# Patient Record
Sex: MALE | Race: WHITE | NOT HISPANIC OR LATINO | Employment: FULL TIME | URBAN - METROPOLITAN AREA
[De-identification: names, ages, dates, MRNs, and addresses within clinical notes are randomized per-mention and may not be internally consistent; named-entity substitution may affect disease eponyms.]

---

## 2020-08-15 ENCOUNTER — APPOINTMENT (EMERGENCY)
Dept: RADIOLOGY | Facility: HOSPITAL | Age: 65
DRG: 282 | End: 2020-08-15
Payer: COMMERCIAL

## 2020-08-15 ENCOUNTER — HOSPITAL ENCOUNTER (INPATIENT)
Facility: HOSPITAL | Age: 65
LOS: 1 days | DRG: 282 | End: 2020-08-17
Attending: EMERGENCY MEDICINE | Admitting: FAMILY MEDICINE
Payer: COMMERCIAL

## 2020-08-15 ENCOUNTER — APPOINTMENT (EMERGENCY)
Dept: CT IMAGING | Facility: HOSPITAL | Age: 65
DRG: 282 | End: 2020-08-15
Payer: COMMERCIAL

## 2020-08-15 DIAGNOSIS — I21.4 NSTEMI (NON-ST ELEVATED MYOCARDIAL INFARCTION) (HCC): Primary | ICD-10-CM

## 2020-08-15 DIAGNOSIS — T78.40XA ALLERGIC REACTION, INITIAL ENCOUNTER: ICD-10-CM

## 2020-08-15 PROBLEM — I10 ACCELERATED HYPERTENSION: Status: ACTIVE | Noted: 2020-08-15

## 2020-08-15 PROBLEM — T50.8X5A ALLERGIC REACTION TO CONTRAST DYE: Status: ACTIVE | Noted: 2020-08-15

## 2020-08-15 PROBLEM — R01.1 MURMUR, CARDIAC: Status: ACTIVE | Noted: 2020-08-15

## 2020-08-15 PROBLEM — R79.89 ELEVATED BRAIN NATRIURETIC PEPTIDE (BNP) LEVEL: Status: ACTIVE | Noted: 2020-08-15

## 2020-08-15 LAB
ALBUMIN SERPL BCP-MCNC: 4.3 G/DL (ref 3.5–5)
ALP SERPL-CCNC: 79 U/L (ref 46–116)
ALT SERPL W P-5'-P-CCNC: 29 U/L (ref 12–78)
ANION GAP SERPL CALCULATED.3IONS-SCNC: 10 MMOL/L (ref 4–13)
AST SERPL W P-5'-P-CCNC: 20 U/L (ref 5–45)
BASOPHILS # BLD AUTO: 0.05 THOUSANDS/ΜL (ref 0–0.1)
BASOPHILS NFR BLD AUTO: 1 % (ref 0–1)
BILIRUB SERPL-MCNC: 0.5 MG/DL (ref 0.2–1)
BUN SERPL-MCNC: 10 MG/DL (ref 5–25)
CALCIUM SERPL-MCNC: 10.1 MG/DL (ref 8.3–10.1)
CHLORIDE SERPL-SCNC: 103 MMOL/L (ref 100–108)
CO2 SERPL-SCNC: 29 MMOL/L (ref 21–32)
CREAT SERPL-MCNC: 1.07 MG/DL (ref 0.6–1.3)
EOSINOPHIL # BLD AUTO: 0.08 THOUSAND/ΜL (ref 0–0.61)
EOSINOPHIL NFR BLD AUTO: 1 % (ref 0–6)
ERYTHROCYTE [DISTWIDTH] IN BLOOD BY AUTOMATED COUNT: 13.2 % (ref 11.6–15.1)
GFR SERPL CREATININE-BSD FRML MDRD: 72 ML/MIN/1.73SQ M
GLUCOSE SERPL-MCNC: 113 MG/DL (ref 65–140)
HCT VFR BLD AUTO: 48 % (ref 36.5–49.3)
HGB BLD-MCNC: 15.4 G/DL (ref 12–17)
IMM GRANULOCYTES # BLD AUTO: 0.07 THOUSAND/UL (ref 0–0.2)
IMM GRANULOCYTES NFR BLD AUTO: 1 % (ref 0–2)
LYMPHOCYTES # BLD AUTO: 0.96 THOUSANDS/ΜL (ref 0.6–4.47)
LYMPHOCYTES NFR BLD AUTO: 9 % (ref 14–44)
MCH RBC QN AUTO: 27.4 PG (ref 26.8–34.3)
MCHC RBC AUTO-ENTMCNC: 32.1 G/DL (ref 31.4–37.4)
MCV RBC AUTO: 85 FL (ref 82–98)
MONOCYTES # BLD AUTO: 0.46 THOUSAND/ΜL (ref 0.17–1.22)
MONOCYTES NFR BLD AUTO: 4 % (ref 4–12)
NEUTROPHILS # BLD AUTO: 9.28 THOUSANDS/ΜL (ref 1.85–7.62)
NEUTS SEG NFR BLD AUTO: 84 % (ref 43–75)
NRBC BLD AUTO-RTO: 0 /100 WBCS
NT-PROBNP SERPL-MCNC: 804 PG/ML
PLATELET # BLD AUTO: 234 THOUSANDS/UL (ref 149–390)
PMV BLD AUTO: 12 FL (ref 8.9–12.7)
POTASSIUM SERPL-SCNC: 4.6 MMOL/L (ref 3.5–5.3)
PROT SERPL-MCNC: 8.2 G/DL (ref 6.4–8.2)
RBC # BLD AUTO: 5.62 MILLION/UL (ref 3.88–5.62)
SODIUM SERPL-SCNC: 142 MMOL/L (ref 136–145)
TROPONIN I SERPL-MCNC: 0.22 NG/ML
TROPONIN I SERPL-MCNC: 5.96 NG/ML
WBC # BLD AUTO: 10.9 THOUSAND/UL (ref 4.31–10.16)

## 2020-08-15 PROCEDURE — 80053 COMPREHEN METABOLIC PANEL: CPT | Performed by: EMERGENCY MEDICINE

## 2020-08-15 PROCEDURE — 85025 COMPLETE CBC W/AUTO DIFF WBC: CPT | Performed by: EMERGENCY MEDICINE

## 2020-08-15 PROCEDURE — 83880 ASSAY OF NATRIURETIC PEPTIDE: CPT | Performed by: EMERGENCY MEDICINE

## 2020-08-15 PROCEDURE — 84484 ASSAY OF TROPONIN QUANT: CPT | Performed by: INTERNAL MEDICINE

## 2020-08-15 PROCEDURE — 71045 X-RAY EXAM CHEST 1 VIEW: CPT

## 2020-08-15 PROCEDURE — 74174 CTA ABD&PLVS W/CONTRAST: CPT

## 2020-08-15 PROCEDURE — 36415 COLL VENOUS BLD VENIPUNCTURE: CPT | Performed by: EMERGENCY MEDICINE

## 2020-08-15 PROCEDURE — 99285 EMERGENCY DEPT VISIT HI MDM: CPT | Performed by: EMERGENCY MEDICINE

## 2020-08-15 PROCEDURE — 96375 TX/PRO/DX INJ NEW DRUG ADDON: CPT

## 2020-08-15 PROCEDURE — 96374 THER/PROPH/DIAG INJ IV PUSH: CPT

## 2020-08-15 PROCEDURE — 84484 ASSAY OF TROPONIN QUANT: CPT | Performed by: EMERGENCY MEDICINE

## 2020-08-15 PROCEDURE — 96361 HYDRATE IV INFUSION ADD-ON: CPT

## 2020-08-15 PROCEDURE — G1004 CDSM NDSC: HCPCS

## 2020-08-15 PROCEDURE — 94640 AIRWAY INHALATION TREATMENT: CPT

## 2020-08-15 PROCEDURE — 71275 CT ANGIOGRAPHY CHEST: CPT

## 2020-08-15 PROCEDURE — 94664 DEMO&/EVAL PT USE INHALER: CPT

## 2020-08-15 PROCEDURE — 99285 EMERGENCY DEPT VISIT HI MDM: CPT

## 2020-08-15 PROCEDURE — 94760 N-INVAS EAR/PLS OXIMETRY 1: CPT

## 2020-08-15 PROCEDURE — 93005 ELECTROCARDIOGRAM TRACING: CPT

## 2020-08-15 PROCEDURE — 99220 PR INITIAL OBSERVATION CARE/DAY 70 MINUTES: CPT | Performed by: INTERNAL MEDICINE

## 2020-08-15 RX ORDER — SODIUM CHLORIDE 9 MG/ML
3 INJECTION INTRAVENOUS
Status: DISCONTINUED | OUTPATIENT
Start: 2020-08-15 | End: 2020-08-17 | Stop reason: HOSPADM

## 2020-08-15 RX ORDER — ONDANSETRON 2 MG/ML
4 INJECTION INTRAMUSCULAR; INTRAVENOUS EVERY 6 HOURS PRN
Status: DISCONTINUED | OUTPATIENT
Start: 2020-08-15 | End: 2020-08-17 | Stop reason: HOSPADM

## 2020-08-15 RX ORDER — DIPHENHYDRAMINE HYDROCHLORIDE 50 MG/ML
50 INJECTION INTRAMUSCULAR; INTRAVENOUS ONCE
Status: COMPLETED | OUTPATIENT
Start: 2020-08-15 | End: 2020-08-15

## 2020-08-15 RX ORDER — ASPIRIN 81 MG/1
81 TABLET, CHEWABLE ORAL DAILY
Status: DISCONTINUED | OUTPATIENT
Start: 2020-08-16 | End: 2020-08-17 | Stop reason: HOSPADM

## 2020-08-15 RX ORDER — ALBUTEROL SULFATE 2.5 MG/3ML
5 SOLUTION RESPIRATORY (INHALATION) ONCE
Status: COMPLETED | OUTPATIENT
Start: 2020-08-15 | End: 2020-08-15

## 2020-08-15 RX ORDER — HEPARIN SODIUM 1000 [USP'U]/ML
4000 INJECTION, SOLUTION INTRAVENOUS; SUBCUTANEOUS
Status: DISCONTINUED | OUTPATIENT
Start: 2020-08-15 | End: 2020-08-17

## 2020-08-15 RX ORDER — CALCIUM CARBONATE 200(500)MG
1000 TABLET,CHEWABLE ORAL DAILY PRN
Status: DISCONTINUED | OUTPATIENT
Start: 2020-08-15 | End: 2020-08-17 | Stop reason: HOSPADM

## 2020-08-15 RX ORDER — HEPARIN SODIUM 10000 [USP'U]/100ML
3-20 INJECTION, SOLUTION INTRAVENOUS
Status: DISCONTINUED | OUTPATIENT
Start: 2020-08-15 | End: 2020-08-17

## 2020-08-15 RX ORDER — SIMETHICONE 80 MG
80 TABLET,CHEWABLE ORAL 4 TIMES DAILY PRN
Status: DISCONTINUED | OUTPATIENT
Start: 2020-08-15 | End: 2020-08-17 | Stop reason: HOSPADM

## 2020-08-15 RX ORDER — METHYLPREDNISOLONE SODIUM SUCCINATE 125 MG/2ML
125 INJECTION, POWDER, LYOPHILIZED, FOR SOLUTION INTRAMUSCULAR; INTRAVENOUS DAILY
Status: DISCONTINUED | OUTPATIENT
Start: 2020-08-15 | End: 2020-08-15

## 2020-08-15 RX ORDER — HEPARIN SODIUM 1000 [USP'U]/ML
4000 INJECTION, SOLUTION INTRAVENOUS; SUBCUTANEOUS ONCE
Status: DISCONTINUED | OUTPATIENT
Start: 2020-08-15 | End: 2020-08-15

## 2020-08-15 RX ORDER — ATORVASTATIN CALCIUM 40 MG/1
40 TABLET, FILM COATED ORAL EVERY EVENING
Status: DISCONTINUED | OUTPATIENT
Start: 2020-08-15 | End: 2020-08-17 | Stop reason: HOSPADM

## 2020-08-15 RX ORDER — NITROGLYCERIN 0.4 MG/1
0.4 TABLET SUBLINGUAL
Status: DISCONTINUED | OUTPATIENT
Start: 2020-08-15 | End: 2020-08-15

## 2020-08-15 RX ORDER — NITROGLYCERIN 0.4 MG/1
0.4 TABLET SUBLINGUAL
Status: DISCONTINUED | OUTPATIENT
Start: 2020-08-15 | End: 2020-08-17 | Stop reason: HOSPADM

## 2020-08-15 RX ORDER — IPRATROPIUM BROMIDE AND ALBUTEROL SULFATE 2.5; .5 MG/3ML; MG/3ML
3 SOLUTION RESPIRATORY (INHALATION) EVERY 4 HOURS PRN
Status: DISCONTINUED | OUTPATIENT
Start: 2020-08-15 | End: 2020-08-15

## 2020-08-15 RX ORDER — ACETAMINOPHEN 325 MG/1
650 TABLET ORAL EVERY 4 HOURS PRN
Status: DISCONTINUED | OUTPATIENT
Start: 2020-08-15 | End: 2020-08-17 | Stop reason: HOSPADM

## 2020-08-15 RX ORDER — METHYLPREDNISOLONE SODIUM SUCCINATE 40 MG/ML
40 INJECTION, POWDER, LYOPHILIZED, FOR SOLUTION INTRAMUSCULAR; INTRAVENOUS EVERY 8 HOURS SCHEDULED
Status: DISCONTINUED | OUTPATIENT
Start: 2020-08-16 | End: 2020-08-17

## 2020-08-15 RX ORDER — PANTOPRAZOLE SODIUM 40 MG/1
40 INJECTION, POWDER, FOR SOLUTION INTRAVENOUS
Status: DISCONTINUED | OUTPATIENT
Start: 2020-08-16 | End: 2020-08-17 | Stop reason: HOSPADM

## 2020-08-15 RX ORDER — ASPIRIN 81 MG/1
324 TABLET, CHEWABLE ORAL ONCE
Status: COMPLETED | OUTPATIENT
Start: 2020-08-15 | End: 2020-08-15

## 2020-08-15 RX ORDER — FAMOTIDINE 20 MG/1
20 TABLET, FILM COATED ORAL 2 TIMES DAILY
Status: DISCONTINUED | OUTPATIENT
Start: 2020-08-15 | End: 2020-08-17 | Stop reason: HOSPADM

## 2020-08-15 RX ORDER — LORATADINE 10 MG/1
10 TABLET ORAL DAILY
Status: DISCONTINUED | OUTPATIENT
Start: 2020-08-16 | End: 2020-08-17 | Stop reason: HOSPADM

## 2020-08-15 RX ORDER — HEPARIN SODIUM 1000 [USP'U]/ML
4000 INJECTION, SOLUTION INTRAVENOUS; SUBCUTANEOUS ONCE
Status: COMPLETED | OUTPATIENT
Start: 2020-08-15 | End: 2020-08-15

## 2020-08-15 RX ORDER — HEPARIN SODIUM 1000 [USP'U]/ML
2000 INJECTION, SOLUTION INTRAVENOUS; SUBCUTANEOUS
Status: DISCONTINUED | OUTPATIENT
Start: 2020-08-15 | End: 2020-08-17

## 2020-08-15 RX ADMIN — ALBUTEROL SULFATE 5 MG: 2.5 SOLUTION RESPIRATORY (INHALATION) at 16:01

## 2020-08-15 RX ADMIN — SODIUM CHLORIDE 500 ML: 0.9 INJECTION, SOLUTION INTRAVENOUS at 16:06

## 2020-08-15 RX ADMIN — METHYLPREDNISOLONE SODIUM SUCCINATE 40 MG: 40 INJECTION, POWDER, FOR SOLUTION INTRAMUSCULAR; INTRAVENOUS at 23:14

## 2020-08-15 RX ADMIN — HEPARIN SODIUM 4000 UNITS: 1000 INJECTION INTRAVENOUS; SUBCUTANEOUS at 18:12

## 2020-08-15 RX ADMIN — FAMOTIDINE 20 MG: 20 TABLET ORAL at 18:06

## 2020-08-15 RX ADMIN — IOHEXOL 100 ML: 350 INJECTION, SOLUTION INTRAVENOUS at 15:42

## 2020-08-15 RX ADMIN — FAMOTIDINE 20 MG: 10 INJECTION, SOLUTION INTRAVENOUS at 16:10

## 2020-08-15 RX ADMIN — ASPIRIN 81 MG 324 MG: 81 TABLET ORAL at 16:58

## 2020-08-15 RX ADMIN — METOPROLOL TARTRATE 25 MG: 25 TABLET, FILM COATED ORAL at 20:19

## 2020-08-15 RX ADMIN — HEPARIN SODIUM AND DEXTROSE 11.8 UNITS/KG/HR: 10000; 5 INJECTION INTRAVENOUS at 18:11

## 2020-08-15 RX ADMIN — ATORVASTATIN CALCIUM 40 MG: 40 TABLET, FILM COATED ORAL at 18:06

## 2020-08-15 RX ADMIN — METHYLPREDNISOLONE SODIUM SUCCINATE 125 MG: 125 INJECTION, POWDER, FOR SOLUTION INTRAMUSCULAR; INTRAVENOUS at 16:00

## 2020-08-15 RX ADMIN — NITROGLYCERIN 0.4 MG: 0.4 TABLET SUBLINGUAL at 15:01

## 2020-08-15 RX ADMIN — DIPHENHYDRAMINE HYDROCHLORIDE 50 MG: 50 INJECTION, SOLUTION INTRAMUSCULAR; INTRAVENOUS at 15:59

## 2020-08-15 RX ADMIN — NITROGLYCERIN 0.4 MG: 0.4 TABLET SUBLINGUAL at 14:53

## 2020-08-15 NOTE — H&P
History and Physical - [facility] Internal Medicine    Patient Information: Madison Harry 72 y o  male MRN: [id number]  Unit/Bed#: -01 Encounter: [id number]  Admitting Physician: Tk Quick MD  PCP: Noelle Aranda MD  Date of Admission:  08/15/20    Assessment/Plan:      * NSTEMI (non-ST elevated myocardial infarction) [facility])  Assessment & Plan  Patient here with chest pain/heartburn symptoms  Troponin slightly elevated  Some subtle EKG changes  Recent had an allergic reaction to dye  He is being treated for that  Cycle further cardiac enzymes  Start beta-blocker in addition to aspirin and statin  Blood pressure also on the higher side  He is not on any medications at home  He is also overweight  Plan of care was discussed with patient  Cardiology service has already been contacted from the ER  Patient has been started on IV heparin  Allergic reaction to contrast dye  Assessment & Plan  Patient had allergic reaction to IV dye  He has received some breathing treatment in addition to IV Benadryl, IV steroid does and Pepcid  He is still having symptoms because of that  Would give him few more doses of IV steroids and then maybe would need oral steroid prior to discharge to make sure that his symptoms are completely resolved given the severity of his reaction  Would continue with Pepcid twice daily in addition to adding Claritin  Give Benadryl p r n     Would also give breathing treatment as needed  Murmur, cardiac  Assessment & Plan  Get echo  Cardiology to see  Discussed with Dr Isa Sutherland  Elevated brain natriuretic peptide (BNP) level  Assessment & Plan  No previous history of heart issues  Could an echocardiogram to assess ejection fraction  Cardiology did see patient as well  Currently does not appear to be fluid overloaded  Chest x-ray unremarkable for any significant abnormalities including heart failure    Would continue to monitor    Accelerated hypertension  Assessment & Plan  Started beta-blocker  May have to add additional antihypertensive medications and give IV antihypertensive medication if needed  Recently as per patient's family, he was seen by his primary care physician and blood pressure was pretty much normal       Present on Admission:   NSTEMI (non-ST elevated myocardial infarction) ([facility] [address] )   Accelerated hypertension   Elevated brain natriuretic peptide (BNP) level   Murmur, cardiac        VTE Prophylaxis: Heparin Drip  / sequential compression device   Code Status:  Full code  POLST: There is no POLST form on file for this patient (pre-hospital)    Anticipated Length of Stay:  Patient will be admitted on an Observation basis with an anticipated length of stay of  less than 2 midnights  Justification for Hospital Stay:  Non ST elevation MI    Total Time for Visit, including Counseling / Coordination of Care: 60+ minutes  Greater than 50% of this total time spent on direct patient counseling and coordination of care  Chief Complaint:   Heartburn/chest pain    History of Present Illness:    Chris Edmonds is a 72 y o  male who presents with severe heartburn/substernal chest pain 7 to 8/10 in intensity  Also he had radiation to his both arms left more than right  He just did not feel well and came to the hospital   His blood pressure was elevated  He also had slightly elevated troponin with some EKG changes and had a CT scan of the chest done with dye and he developed severe allergic reaction to that  He is still having little bit of heartburn, however, he short of breath and wheezy after he had allergic reaction  He has some swelling around his eyes although better than before  No abdominal pain  He took some Tums which did not help with his heartburns before  No fever or chills  No hematemesis, melena, hematochezia  No headache  Review of Systems    All systems are reviewed  Positive as per history of presenting illness   Patient answered no to all other questions  Past Medical and Surgical History:     History reviewed  No pertinent past medical history  Past Surgical History:   Procedure Laterality Date    KNEE SURGERY         Meds/Allergies:    Prior to Admission medications    Not on File     Patient does not take any medications on regular basis    Allergies: Allergies   Allergen Reactions    Contrast Dye [Iodinated Diagnostic Agents] Anaphylaxis       Social History:     Marital Status: /Civil Union   Occupation:  Occupational therapist  Patient Pre-hospital Living Situation:  With family  Patient Pre-hospital Level of Mobility:  Independent  Patient Pre-hospital Diet Restrictions:  None  Substance Use History:   Social History     Substance and Sexual Activity   Alcohol Use Not Currently     Social History     Tobacco Use   Smoking Status Never Smoker   Smokeless Tobacco Never Used     Social History     Substance and Sexual Activity   Drug Use Not Currently       Family History:    Did no family history of premature coronary artery disease  Reviewed and not contributory to his current illness        Physical Exam      Vitals:   Blood Pressure: 150/67 (08/15/20 1740)  Pulse: 78 (08/15/20 1700)  Temperature: 99 3 °F (37 4 °C) (08/15/20 1749)  Temp Source: Oral (08/15/20 1420)  Respirations: 19 (08/15/20 1700)  Height: 5' 3" (160 cm) (08/15/20 1420)  Weight - Scale: 86 2 kg (190 lb) (08/15/20 1420)  SpO2: 98 % (08/15/20 1700)    Vital signs are reviewed as above  Constitutional:  Patient basically was walking out of the bathroom and he is somewhat short of breath and wheezy  As per patient, he was not short of breath or wheezy before he came in and it actually started after he had allergic reaction to IV contrast   Eyes:  He has some swelling around his eyes/eyelids  EOM grossly intact  Conjunctivae slightly pale  Patient has anicteric sclera  HENT: Oropharynx are moist  Did not notice any significant lesions on the tongue  Head normocephalic  He has some swelling of his lips before which is resolving  Neck: Neck is supple  I was not able to visualize any JVD at 90°  There is no significant lymphadenopathy  I also did not notice any significant thyromegaly  Cardiac: I did not hear any rubs or gallop  Patient appears to be in sinus rhythm  Has loud systolic murmur  Respiratory:  Still short of breath  Also has few wheezes audible  He has loud systolic murmur  GI: Abdomen is soft  It is grossly nontender  Bowel sounds are adequate  I was not able to appreciate any hepatosplenomegaly  Neurologic:  Patient is awake and alert  Neurological examination is grossly intact  No obvious focal neurological deficit noticed  Skin: Skin is warm and dry  Psychiatric: Mood and affect are pleasant  Musculoskeletal  Patient moving all extremities   Extremities: Patient has no significant cyanosis, clubbing, or lower extremity edema           Additional Data:     Lab Results: I have personally reviewed pertinent reports  Results from last 7 days   Lab Units 08/15/20  1453   WBC Thousand/uL 10 90*   HEMOGLOBIN g/dL 15 4   HEMATOCRIT % 48 0   PLATELETS Thousands/uL 234   NEUTROS PCT % 84*   LYMPHS PCT % 9*   MONOS PCT % 4   EOS PCT % 1     Results from last 7 days   Lab Units 08/15/20  1453   POTASSIUM mmol/L 4 6   CHLORIDE mmol/L 103   CO2 mmol/L 29   BUN mg/dL 10   CREATININE mg/dL 1 07   CALCIUM mg/dL 10 1   ALK PHOS U/L 79   ALT U/L 29   AST U/L 20           Imaging: I have personally reviewed pertinent reports  X-ray Chest 1 View Portable    Result Date: 8/15/2020  Narrative: CHEST INDICATION:   chest pain  COMPARISON:  12/7/2011 EXAM PERFORMED/VIEWS:  XR CHEST PORTABLE  AP semierect Images: 1 FINDINGS: The heart is top normal in size  The mediastinum and hilar structures are normal  Lungs are hyperinflated but clear  No pleural effusions or pneumothorax  Osseous structures appear within normal limits for patient age  Impression: No acute cardiopulmonary disease  Workstation performed: [id number]     Cta Chest Abdomen Pelvis W Wo Contrast (dissection)    Result Date: 8/15/2020  Narrative: CT ANGIOGRAM OF THE CHEST, ABDOMEN AND PELVIS WITH AND WITHOUT IV CONTRAST INDICATION:  Shortness of breath, chest /back /abdomen pain COMPARISON: Chest radiograph 8/15/2020 TECHNIQUE:  CT angiogram examination of the chest, abdomen and pelvis was performed according to standard protocol  This examination, like all CT scans performed in the [facility], was performed utilizing techniques to minimize radiation dose exposure, including the use of iterative reconstruction and automated exposure control  Contrast as well as noncontrast images were obtained  3D reconstructions were performed an independent workstation, and are supplied for review  Rad dose 1825 mGy-cm IV Contrast:  100 mL of iohexol (OMNIPAQUE) Enteric Contrast:  Not given FINDINGS: VASCULAR STRUCTURES:  There is adequate opacification of the pulmonary arterial vasculature with no filling defects identified to suggest acute pulmonary emboli  Pulmonary artery is of normal caliber  RV/LV ratio within normal limits  Adequate opacification of the thoracic and abdominal aorta and branch vessels noted  Vessels are of normal caliber  Classical arch great vessel anatomy is identified with widely patent vessel origins  Atherosclerotic calcification of the aortic valve noted diffusely  Patent single renal arteries noted bilaterally  Patent celiac, superior mesenteric and inferior mesenteric arteries noted  Mild atherosclerotic calcified and noncalcified plaque of the abdominal aorta and branch vessels noted  Mild ectasia  of the infrarenal abdominal aorta is present  No evidence for aortic dissection  OTHER FINDINGS: CHEST: HEART: Top normal size heart  Aortic valve calcification and mild coronary artery calcification present    LUNGS:  Unremarkable  PLEURA: No pleural effusion  MEDIASTINUM AND DELICIA:  No mass or significant lymphadenopathy  CHEST WALL AND LOWER NECK:  7 mm nodule in the mid left thyroid gland  Incidental discovery of one or more thyroid nodule(s) measuring less than 1 5 cm and without suspicious features is noted in this patient who is above 28years old; according to guidelines published in the February 2015 white paper on incidental thyroid nodules in the Journal of the [employer] of Radiology [facility]), no further evaluation is recommended    ABDOMEN LIVER/BILIARY TREE:  Unremarkable  GALLBLADDER:  No calcified gallstones  No pericholecystic inflammatory change  SPLEEN:  Unremarkable  Normal size  PANCREAS:  Unremarkable  ADRENAL GLANDS:  Unremarkable  KIDNEYS/URETERS:  No solid renal mass  No hydronephrosis  No urinary tract calculi  PELVIS REPRODUCTIVE ORGANS:  Unremarkable for patient's age  URINARY BLADDER:  Decompressed  ADDITIONAL ABDOMINAL AND PELVIC STRUCTURES: STOMACH AND BOWEL:  Moderate fecal material in the colon and rectum consistent with constipation  No bowel wall thickening  No intestinal obstruction  A few scattered diverticula are incidentally noted  Appendix is unremarkable  ABDOMINOPELVIC CAVITY:  No pathologically enlarged mesenteric or retroperitoneal lymph nodes  No ascites or free intraperitoneal air  ABDOMINAL WALL/INGUINAL REGIONS:  Unremarkable  OSSEOUS STRUCTURES:  No acute fracture or destructive osseous lesion  Impression: 1  No evidence for acute pulmonary embolism or other acute intrathoracic abnormality  2   Mild atherosclerotic disease of the thoracic and abdominal aorta with no evidence for aneurysmal dilatation, vascular stenosis or occlusion, or dissection  3   7 mm left thyroid nodule which does not necessitate additional workup  4   Extensive aortic valve calcification should be clinically correlated  5   Additional findings as noted   Workstation performed: [id number]       EKG, Pathology, and Other Studies Reviewed on Admission:   · EKG:  Sinus rhythm  Some lateral leads inverted  Do not have an old EKG to compare with  Allscripts Records Reviewed: No     ** Please Note: Dragon 360 Dictation voice to text software may have been used in the creation of this document   **

## 2020-08-15 NOTE — ASSESSMENT & PLAN NOTE
· Pt presented with chest pain symptoms  · EKG unable to viewed at this time, however upon review of ED provider's note, pt noted to have T wave inversions in the lateral leads, no prior to compare   · Troponin continues to uptrend to 11 49, trend to peak  · Continue IV heparin gtt   · Cardiology following as above  · Telemetry monitoring   · Continue ASA, statin, BB   · PRN nitro   · Plan for cardiac catheterization tomorrow, pre-treatment for contrast allergy as above

## 2020-08-15 NOTE — ASSESSMENT & PLAN NOTE
·  on admission   · ECHO ordered and pending   · CXR unremarkable  · Appears euvolemic at this time, follow up ECHO results

## 2020-08-15 NOTE — ASSESSMENT & PLAN NOTE
· Pt with allergic reaction to IV dye in the ED while obtaining CTA  · Noted to have periorbital swelling and shortness of breath/wheezing which has now completely resolved    · Given pepcid, IV solu-medrol and PRN IV Benadryl   · Continue IV Solu-medrol 40 mg Q8H and give dose of IV Benadryl 50 mg one hour prior to angiography per cardiology  · NPO after midnight

## 2020-08-15 NOTE — PLAN OF CARE
Problem: Potential for Falls  Goal: Patient will remain free of falls  Description: INTERVENTIONS:  - Assess patient frequently for physical needs  -  Identify cognitive and physical deficits and behaviors that affect risk of falls    -  [place] fall precautions as indicated by assessment   - Educate patient/family on patient safety including physical limitations  - Instruct patient to call for assistance with activity based on assessment  - Modify environment to reduce risk of injury  - Consider OT/PT consult to assist with strengthening/mobility  Outcome: Progressing     Problem: PAIN - ADULT  Goal: Verbalizes/displays adequate comfort level or baseline comfort level  Description: Interventions:  - Encourage patient to monitor pain and request assistance  - Assess pain using appropriate pain scale  - Administer analgesics based on type and severity of pain and evaluate response  - Implement non-pharmacological measures as appropriate and evaluate response  - Consider cultural and social influences on pain and pain management  - Notify physician/advanced practitioner if interventions unsuccessful or patient reports new pain  Outcome: Progressing     Problem: INFECTION - ADULT  Goal: Absence or prevention of progression during hospitalization  Description: INTERVENTIONS:  - Assess and monitor for signs and symptoms of infection  - Monitor lab/diagnostic results  - Monitor all insertion sites, i e  indwelling lines, tubes, and drains  - Monitor endotracheal if appropriate and nasal secretions for changes in amount and color  - [place] appropriate cooling/warming therapies per order  - Administer medications as ordered  - Instruct and encourage patient and family to use good hand hygiene technique  - Identify and instruct in appropriate isolation precautions for identified infection/condition  Outcome: Progressing  Goal: Absence of fever/infection during neutropenic period  Description: INTERVENTIONS:  - Monitor WBC    Outcome: Progressing     Problem: SAFETY ADULT  Goal: Patient will remain free of falls  Description: INTERVENTIONS:  - Assess patient frequently for physical needs  -  Identify cognitive and physical deficits and behaviors that affect risk of falls    -  [place] fall precautions as indicated by assessment   - Educate patient/family on patient safety including physical limitations  - Instruct patient to call for assistance with activity based on assessment  - Modify environment to reduce risk of injury  - Consider OT/PT consult to assist with strengthening/mobility  Outcome: Progressing  Goal: Maintain or return to baseline ADL function  Description: INTERVENTIONS:  -  Assess patient's ability to carry out ADLs; assess patient's baseline for ADL function and identify physical deficits which impact ability to perform ADLs (bathing, care of mouth/teeth, toileting, grooming, dressing, etc )  - Assess/evaluate cause of self-care deficits   - Assess range of motion  - Assess patient's mobility; develop plan if impaired  - Assess patient's need for assistive devices and provide as appropriate  - Encourage maximum independence but intervene and supervise when necessary  - Involve family in performance of ADLs  - Assess for home care needs following discharge   - Consider OT consult to assist with ADL evaluation and planning for discharge  - Provide patient education as appropriate  Outcome: Progressing  Goal: Maintain or return mobility status to optimal level  Description: INTERVENTIONS:  - Assess patient's baseline mobility status (ambulation, transfers, stairs, etc )    - Identify cognitive and physical deficits and behaviors that affect mobility  - Identify mobility aids required to assist with transfers and/or ambulation (gait belt, sit-to-stand, lift, walker, cane, etc )  - [place] fall precautions as indicated by assessment  - Record patient progress and toleration of activity level on Mobility SBAR; progress patient to next Phase/Stage  - Instruct patient to call for assistance with activity based on assessment  - Consider rehabilitation consult to assist with strengthening/weightbearing, etc   Outcome: Progressing     Problem: DISCHARGE PLANNING  Goal: Discharge to home or other facility with appropriate resources  Description: INTERVENTIONS:  - Identify barriers to discharge w/patient and caregiver  - Arrange for needed discharge resources and transportation as appropriate  - Identify discharge learning needs (meds, wound care, etc )  - Arrange for interpretive services to assist at discharge as needed  - Refer to Case Management Department for coordinating discharge planning if the patient needs post-hospital services based on physician/advanced practitioner order or complex needs related to functional status, cognitive ability, or social support system  Outcome: Progressing     Problem: Knowledge Deficit  Goal: Patient/family/caregiver demonstrates understanding of disease process, treatment plan, medications, and discharge instructions  Description: Complete learning assessment and assess knowledge base    Interventions:  - Provide teaching at level of understanding  - Provide teaching via preferred learning methods  Outcome: Progressing

## 2020-08-15 NOTE — ASSESSMENT & PLAN NOTE
· POA, /100  · Not previously maintained on any antihypertensives at home   · Significant improvement noted with Lopressor 25 mg BID, continue for now  · Cardiology consultation pending  · Continue to monitor

## 2020-08-15 NOTE — ED NOTES
Pt c/o eye swelling and itching, pt begins  Wheezing,  dr Pawan Ordonez at bedside  VSS        Blake Zamora RN  08/15/20 8224

## 2020-08-15 NOTE — ED PROVIDER NOTES
History  Chief Complaint   Patient presents with    Chest Pain     Patient presents to ED with co non radiating chest pain and heart burn that started this morning  Patient reprots taking tums with no relief  Patient is a 59-year-old male no past medical history presenting for chest pain  Patient has 2 central  burning chest pain radiating to the bilateral arms which has been constant for roughly 5 hours began while he was walking around the house  States this has happened in the past but normally self resolved and that he notices it is better with rest and worse with exertion  States that he took Pepto-Bismol and [place] with no relief  Denies any shortness of breath, nausea vomiting, dizziness, numbness or tingling, respiratory symptoms, rashes, vision changes, dysuria  Denies any sick contacts or travel  Has no cardiac history, has never had a cardiac catheterization has no family history or sudden cardiac          None       History reviewed  No pertinent past medical history  Past Surgical History:   Procedure Laterality Date    KNEE SURGERY         History reviewed  No pertinent family history  I have reviewed and agree with the history as documented  E-Cigarette/Vaping    E-Cigarette Use Never User      E-Cigarette/Vaping Substances     Social History     Tobacco Use    Smoking status: Never Smoker    Smokeless tobacco: Never Used   Substance Use Topics    Alcohol use: Not Currently    Drug use: Not Currently       Review of Systems   All other systems reviewed and are negative  Physical Exam  Physical Exam  Vitals signs reviewed  Constitutional:       Appearance: He is well-developed  He is not ill-appearing  Eyes:      Pupils: Pupils are equal, round, and reactive to light  Neck:      Musculoskeletal: Neck supple  Vascular: No JVD  Cardiovascular:      Rate and Rhythm: Normal rate and regular rhythm        Pulses:           Radial pulses are 2+ on the right side and 2+ on the left side  Heart sounds: Murmur present  Systolic murmur present  Pulmonary:      Effort: Pulmonary effort is normal  No respiratory distress  Breath sounds: Normal breath sounds  Chest:      Chest wall: No tenderness  Abdominal:      Palpations: Abdomen is soft  Tenderness: There is no abdominal tenderness  Musculoskeletal:      Left lower leg: No edema  Skin:     General: Skin is warm and dry  Neurological:      General: No focal deficit present  Mental Status: He is alert     Psychiatric:         Mood and Affect: Mood normal          Vital Signs  ED Triage Vitals   Temp Pulse Respirations Blood Pressure SpO2   -- 08/15/20 1454 08/15/20 1420 08/15/20 1420 08/15/20 1420    77 17 (!) 228/100 97 %      Temp Source Heart Rate Source Patient Position - Orthostatic VS BP Location FiO2 (%)   08/15/20 1420 08/15/20 1420 08/15/20 1420 08/15/20 1420 --   Oral Monitor Sitting Left arm       Pain Score       --                  Vitals:    08/15/20 1530 08/15/20 1610 08/15/20 1630 08/15/20 1645   BP: (!) 175/77 (!) 177/79 (!) 182/80 154/65   Pulse: 62 73 81 73   Patient Position - Orthostatic VS: Lying Lying           Visual Acuity      ED Medications  Medications   sodium chloride (PF) 0 9 % injection 3 mL (has no administration in time range)   nitroglycerin (NITROSTAT) SL tablet 0 4 mg (0 4 mg Sublingual Given 8/15/20 1501)   methylPREDNISolone sodium succinate (Solu-MEDROL) injection 125 mg (125 mg Intravenous Given 8/15/20 1600)   sodium chloride 0 9 % bolus 500 mL (500 mL Intravenous New Bag 8/15/20 1606)   sodium chloride (PF) 0 9 % injection 3 mL (has no administration in time range)   heparin (porcine) injection 4,000 Units (has no administration in time range)   aspirin chewable tablet 324 mg (has no administration in time range)   iohexol (OMNIPAQUE) 350 MG/ML injection (MULTI-DOSE) 100 mL (100 mL Intravenous Given 8/15/20 1542)   diphenhydrAMINE (BENADRYL) injection 50 mg (50 mg Intravenous Given 8/15/20 1559)   albuterol inhalation solution 5 mg (5 mg Nebulization Given 8/15/20 1601)   famotidine (PEPCID) injection 20 mg (20 mg Intravenous Given 8/15/20 1610)       Diagnostic Studies  Results Reviewed     Procedure Component Value Units Date/Time    NT-BNP PRO [[id number]]  (Abnormal) Collected:  08/15/20 1453    Lab Status:  Final result Specimen:  Blood from Arm, Left Updated:  08/15/20 1528     NT-proBNP 804 pg/mL     Comprehensive metabolic panel [[id number]] Collected:  08/15/20 1453    Lab Status:  Final result Specimen:  Blood from Arm, Left Updated:  08/15/20 1525     Sodium 142 mmol/L      Potassium 4 6 mmol/L      Chloride 103 mmol/L      CO2 29 mmol/L      ANION GAP 10 mmol/L      BUN 10 mg/dL      Creatinine 1 07 mg/dL      Glucose 113 mg/dL      Calcium 10 1 mg/dL      AST 20 U/L      ALT 29 U/L      Alkaline Phosphatase 79 U/L      Total Protein 8 2 g/dL      Albumin 4 3 g/dL      Total Bilirubin 0 50 mg/dL      eGFR 72 ml/min/1 73sq m     Narrative:       [place] guidelines for Chronic Kidney Disease (CKD):     Stage 1 with normal or high GFR (GFR > 90 mL/min/1 73 square meters)    Stage 2 Mild CKD (GFR = 60-89 mL/min/1 73 square meters)    Stage 3A Moderate CKD (GFR = 45-59 mL/min/1 73 square meters)    Stage 3B Moderate CKD (GFR = 30-44 mL/min/1 73 square meters)    Stage 4 Severe CKD (GFR = 15-29 mL/min/1 73 square meters)    Stage 5 End Stage CKD (GFR <15 mL/min/1 73 square meters)  Note: GFR calculation is accurate only with a steady state creatinine    Troponin I [[id number]]  (Abnormal) Collected:  08/15/20 1453    Lab Status:  Final result Specimen:  Blood from Arm, Left Updated:  08/15/20 1521     Troponin I 0 22 ng/mL     CBC and differential [[id number]]  (Abnormal) Collected:  08/15/20 1453    Lab Status:  Final result Specimen:  Blood from Arm, Left Updated:  08/15/20 1502     WBC 10 90 Thousand/uL      RBC 5 62 Million/uL      Hemoglobin 15 4 g/dL      Hematocrit 48 0 %      MCV 85 fL      MCH 27 4 pg      MCHC 32 1 g/dL      RDW 13 2 %      MPV 12 0 fL      Platelets 866 Thousands/uL      nRBC 0 /100 WBCs      Neutrophils Relative 84 %      Immat GRANS % 1 %      Lymphocytes Relative 9 %      Monocytes Relative 4 %      Eosinophils Relative 1 %      Basophils Relative 1 %      Neutrophils Absolute 9 28 Thousands/µL      Immature Grans Absolute 0 07 Thousand/uL      Lymphocytes Absolute 0 96 Thousands/µL      Monocytes Absolute 0 46 Thousand/µL      Eosinophils Absolute 0 08 Thousand/µL      Basophils Absolute 0 05 Thousands/µL                  CTA chest abdomen pelvis w wo contrast (Dissection)   Final Result by Noemi Pena MD (08/15 2934)         1  No evidence for acute pulmonary embolism or other acute intrathoracic abnormality  2   Mild atherosclerotic disease of the thoracic and abdominal aorta with no evidence for aneurysmal dilatation, vascular stenosis or occlusion, or dissection  3   7 mm left thyroid nodule which does not necessitate additional workup  4   Extensive aortic valve calcification should be clinically correlated  5   Additional findings as noted  Workstation performed: [id number]         X-ray chest 1 view portable   ED Interpretation by Darci Paget, DO (08/15 1528)   nad      Final Result by Anna Collins MD (08/15 1540)      No acute cardiopulmonary disease  Workstation performed: [id number]                    Procedures  CriticalCare Time  Performed by: Darci Paget, DO  Authorized by: Darci Paget, DO                ED Course  ED Course as of Aug 15 1649   Sat Aug 15, 2020   1647 Patient's pain improved after nitroglycerin, troponin 0 22, have discussed with cardiology who recommends heparin, aspirin, beta-blocker, statin    Will administer anticoagulation after CT dissection negative      1648 CT dissection negative, patient improving after likely allergic reaction to contrast dye states that his breathing is improving and that he does not feel any swelling to his throat or mouth  I have admitted and start anticoagulation  US AUDIT      Most Recent Value   Initial Alcohol Screen: US AUDIT-C    1  How often do you have a drink containing alcohol?  0 Filed at: 08/15/2020 1421   Audit-C Score  0 Filed at: 08/15/2020 1421            HEART Risk Score      Most Recent Value   Heart Score Risk Calculator   History  1 Filed at: 08/15/2020 1616   ECG  2 Filed at: 08/15/2020 1616   Age  2 Filed at: 08/15/2020 1616   Risk Factors  1 Filed at: 08/15/2020 1616   Troponin  2 Filed at: 08/15/2020 1616   HEART Score  8 Filed at: 08/15/2020 1616        Identification of Seniors at Risk      Most Recent Value   (ISAR) Identification of Seniors at Risk   Before the illness or injury that brought you to the Emergency, did you need someone to help you on a regular basis? 0 Filed at: 08/15/2020 1422   In the last 24 hours, have you needed more help than usual?  0 Filed at: 08/15/2020 1422   Have you been hospitalized for one or more nights during the past 6 months? 0 Filed at: 08/15/2020 1422   In general, do you see well?  0 Filed at: 08/15/2020 1422   In general, do you have serious problems with your memory? 0 Filed at: 08/15/2020 1422   Do you take more than three different medications every day? 1 Filed at: 08/15/2020 1422   ISAR Score  1 Filed at: 08/15/2020 1422          DREW/DAST-10      Most Recent Value   How many times in the past year have you    Used an illegal drug or used a prescription medication for non-medical reasons? Never Filed at: 08/15/2020 1421                 EKG: there are no previous tracings available for comparison, ST depression in 1, AVL, V5, V6     2nd EKG: unchanged from previous tracings  3rd EKG: unchanged from previous tracings                  MDM  Number of Diagnoses or Management Options  Allergic reaction, initial encounter:   NSTEMI (non-ST elevated myocardial infarction) [facility]):   Diagnosis management comments: Patient is a 59-year-old male no past medical history presenting for chest pain concerning for ACS versus dissection  Patient is well-appearing at bedside stable vitals a very hypertensive to greater than 831 systolic but in no acute distress  Has holosystolic murmur at the left sternal border radiating to the carotids and no other significant physical exam findings  Initial EKG shows T-wave inversions in the lateral leads, none prior for comparison, will obtain cardiac workup and administer nitroglycerin  Disposition  Final diagnoses:   NSTEMI (non-ST elevated myocardial infarction) ([facility] )   Allergic reaction, initial encounter     Time reflects when diagnosis was documented in both MDM as applicable and the Disposition within this note     Time User Action Codes Description Comment    8/15/2020  4:36 PM Lavone Chavez Add [I21 4] [facility]STEM[facility]non[facility]ST [facility]     8/15/2020  4:36 PM Lavone Chavez Add [T78 40XA] Allergic reaction, initial encounter       ED Disposition     ED Disposition Condition Date/Time Comment    Admit Stable Sat Aug 15, 2020  4:36 PM Case was discussed with Dr Maribell Mccarthy and the patient's admission status was agreed to be Admission Status: inpatient status to the service of Dr Maribell Mccarthy          Follow-up Information    None         Patient's Medications    No medications on file     No discharge procedures on file      PDMP Review     None          ED Provider  Electronically Signed by           Darci Paget, DO  08/15/20 2686 [facility],[address], DO  08/15/20 0452

## 2020-08-15 NOTE — ED NOTES
Patient reports minimal relief from 1st nitro SL, 2nd dose given        Pankaj Orellana RN  08/15/20 5814

## 2020-08-16 ENCOUNTER — APPOINTMENT (OUTPATIENT)
Dept: NON INVASIVE DIAGNOSTICS | Facility: HOSPITAL | Age: 65
DRG: 282 | End: 2020-08-16
Payer: COMMERCIAL

## 2020-08-16 PROBLEM — R07.9 CHEST PAIN: Status: ACTIVE | Noted: 2020-08-16

## 2020-08-16 PROBLEM — D72.829 LEUKOCYTOSIS: Status: ACTIVE | Noted: 2020-08-16

## 2020-08-16 LAB
APTT PPP: 65 SECONDS (ref 23–37)
APTT PPP: 80 SECONDS (ref 23–37)
ATRIAL RATE: 46 BPM
ATRIAL RATE: 64 BPM
ATRIAL RATE: 65 BPM
ATRIAL RATE: 67 BPM
ATRIAL RATE: 68 BPM
INR PPP: 1.11 (ref 0.84–1.19)
P AXIS: 44 DEGREES
P AXIS: 46 DEGREES
P AXIS: 51 DEGREES
P AXIS: 54 DEGREES
P AXIS: 57 DEGREES
PR INTERVAL: 174 MS
PR INTERVAL: 174 MS
PR INTERVAL: 176 MS
PR INTERVAL: 180 MS
PR INTERVAL: 182 MS
PROTHROMBIN TIME: 13.8 SECONDS (ref 11.6–14.5)
QRS AXIS: 35 DEGREES
QRS AXIS: 52 DEGREES
QRS AXIS: 67 DEGREES
QRS AXIS: 83 DEGREES
QRS AXIS: 90 DEGREES
QRSD INTERVAL: 100 MS
QRSD INTERVAL: 94 MS
QRSD INTERVAL: 96 MS
QRSD INTERVAL: 96 MS
QRSD INTERVAL: 98 MS
QT INTERVAL: 400 MS
QT INTERVAL: 408 MS
QT INTERVAL: 420 MS
QT INTERVAL: 426 MS
QT INTERVAL: 470 MS
QTC INTERVAL: 411 MS
QTC INTERVAL: 422 MS
QTC INTERVAL: 424 MS
QTC INTERVAL: 433 MS
QTC INTERVAL: 452 MS
T WAVE AXIS: 103 DEGREES
T WAVE AXIS: 110 DEGREES
T WAVE AXIS: 130 DEGREES
T WAVE AXIS: 134 DEGREES
T WAVE AXIS: 96 DEGREES
TROPONIN I SERPL-MCNC: 11.49 NG/ML
TROPONIN I SERPL-MCNC: 12.79 NG/ML
TROPONIN I SERPL-MCNC: 13.16 NG/ML
TROPONIN I SERPL-MCNC: 9.67 NG/ML
TROPONIN I SERPL-MCNC: 9.78 NG/ML
VENTRICULAR RATE: 46 BPM
VENTRICULAR RATE: 64 BPM
VENTRICULAR RATE: 65 BPM
VENTRICULAR RATE: 67 BPM
VENTRICULAR RATE: 68 BPM

## 2020-08-16 PROCEDURE — 85730 THROMBOPLASTIN TIME PARTIAL: CPT | Performed by: INTERNAL MEDICINE

## 2020-08-16 PROCEDURE — 93306 TTE W/DOPPLER COMPLETE: CPT

## 2020-08-16 PROCEDURE — 93005 ELECTROCARDIOGRAM TRACING: CPT

## 2020-08-16 PROCEDURE — 99222 1ST HOSP IP/OBS MODERATE 55: CPT | Performed by: INTERNAL MEDICINE

## 2020-08-16 PROCEDURE — 85610 PROTHROMBIN TIME: CPT | Performed by: INTERNAL MEDICINE

## 2020-08-16 PROCEDURE — 99232 SBSQ HOSP IP/OBS MODERATE 35: CPT | Performed by: PHYSICIAN ASSISTANT

## 2020-08-16 PROCEDURE — 84484 ASSAY OF TROPONIN QUANT: CPT | Performed by: INTERNAL MEDICINE

## 2020-08-16 PROCEDURE — 93306 TTE W/DOPPLER COMPLETE: CPT | Performed by: INTERNAL MEDICINE

## 2020-08-16 PROCEDURE — 93010 ELECTROCARDIOGRAM REPORT: CPT | Performed by: INTERNAL MEDICINE

## 2020-08-16 PROCEDURE — 84484 ASSAY OF TROPONIN QUANT: CPT | Performed by: PHYSICIAN ASSISTANT

## 2020-08-16 PROCEDURE — C9113 INJ PANTOPRAZOLE SODIUM, VIA: HCPCS | Performed by: INTERNAL MEDICINE

## 2020-08-16 RX ORDER — DIPHENHYDRAMINE HCL 25 MG
50 TABLET ORAL ONCE
Status: COMPLETED | OUTPATIENT
Start: 2020-08-17 | End: 2020-08-17

## 2020-08-16 RX ADMIN — ATORVASTATIN CALCIUM 40 MG: 40 TABLET, FILM COATED ORAL at 17:50

## 2020-08-16 RX ADMIN — METOPROLOL TARTRATE 25 MG: 25 TABLET, FILM COATED ORAL at 10:46

## 2020-08-16 RX ADMIN — FAMOTIDINE 20 MG: 20 TABLET ORAL at 10:46

## 2020-08-16 RX ADMIN — METHYLPREDNISOLONE SODIUM SUCCINATE 40 MG: 40 INJECTION, POWDER, FOR SOLUTION INTRAMUSCULAR; INTRAVENOUS at 05:13

## 2020-08-16 RX ADMIN — PANTOPRAZOLE SODIUM 40 MG: 40 INJECTION, POWDER, FOR SOLUTION INTRAVENOUS at 10:45

## 2020-08-16 RX ADMIN — METHYLPREDNISOLONE SODIUM SUCCINATE 40 MG: 40 INJECTION, POWDER, FOR SOLUTION INTRAMUSCULAR; INTRAVENOUS at 14:59

## 2020-08-16 RX ADMIN — ASPIRIN 81 MG 81 MG: 81 TABLET ORAL at 10:46

## 2020-08-16 RX ADMIN — METHYLPREDNISOLONE SODIUM SUCCINATE 40 MG: 40 INJECTION, POWDER, FOR SOLUTION INTRAMUSCULAR; INTRAVENOUS at 21:46

## 2020-08-16 RX ADMIN — FAMOTIDINE 20 MG: 20 TABLET ORAL at 17:50

## 2020-08-16 RX ADMIN — LORATADINE 10 MG: 10 TABLET ORAL at 10:46

## 2020-08-16 RX ADMIN — HEPARIN SODIUM AND DEXTROSE 11.8 UNITS/KG/HR: 10000; 5 INJECTION INTRAVENOUS at 17:47

## 2020-08-16 RX ADMIN — METOPROLOL TARTRATE 25 MG: 25 TABLET, FILM COATED ORAL at 21:46

## 2020-08-16 NOTE — PROGRESS NOTES
Progress Note - Justin Banerjee 1955, 72 y o  male MRN: [id number]    Unit/Bed#: -01 Encounter: [id number]    Primary Care Provider: Jeri Azevedo MD   Date and time admitted to hospital: 8/15/2020  2:20 PM      DOS: 8/15/2020    * Chest pain  Assessment & Plan  · Pt presented with severe substernal chest pain, now resolved   · CTA with no evidence of acute PE or other intrathoracic abnormality   Extensive aortic valve calcification   · Troponin 0 22/5 96/9 67/11 49, trend to peak   · Currently on IV heparin gtt   · Continue ASA, statin and added Lopressor 25 mg BID  · Obtain lipid panel   · Cardiology following,  · Plan for cardiac cath tomorrow  · NPO after midnight  · Continue IV solu-medrol 40 mg Q8H and IV benadryl one hour prior to cath for pre-treatment   · ECHO pending    NSTEMI (non-ST elevated myocardial infarction) ([facility] )  Assessment & Plan  · Pt presented with chest pain symptoms  · EKG unable to viewed at this time, however upon review of ED provider's note, pt noted to have T wave inversions in the lateral leads, no prior to compare   · Troponin continues to uptrend to 11 49, trend to peak  · Continue IV heparin gtt   · Cardiology following as above  · Telemetry monitoring   · Continue ASA, statin, BB   · PRN nitro   · Plan for cardiac catheterization tomorrow, pre-treatment for contrast allergy as above    Leukocytosis  Assessment & Plan  · Mild, WBC 10 90 on admission   · Obtain AM lab work tomorrow  · May continue to elevate in setting of IV solu-medrol treatment  · No evidence of acute infectious etiology at this time   · Continue to trend CBC    Allergic reaction to contrast dye  Assessment & Plan  · Pt with allergic reaction to IV dye in the ED while obtaining CTA  · Noted to have periorbital swelling and shortness of breath/wheezing which has now completely resolved    · Given pepcid, IV solu-medrol and PRN IV Benadryl   · Continue IV Solu-medrol 40 mg Q8H and give dose of IV Benadryl 50 mg one hour prior to angiography per cardiology  · NPO after midnight     Murmur, cardiac  Assessment & Plan  · Systolic murmur noted on exam  · Obtain  ECHO  · Cardiology following  · Additional plan as above    Elevated brain natriuretic peptide (BNP) level  Assessment & Plan  ·  on admission   · ECHO ordered and pending   · CXR unremarkable  · Appears euvolemic at this time, follow up ECHO results    Accelerated hypertension  Assessment & Plan  · POA, /100  · Not previously maintained on any antihypertensives at home   · Significant improvement noted with Lopressor 25 mg BID, continue for now  · Cardiology consultation pending  · Continue to monitor    VTE Pharmacologic Prophylaxis:   Pharmacologic: Heparin  Mechanical VTE Prophylaxis in Place: Yes    Patient Centered Rounds: I have evaluated patient without nursing staff present due to Speaking to nurse outside patient's room, Celena    Discussions with Specialists or Other Care Team Provider:  Discussed with Cardiology, RN, cm and reviewed previous notes    Education and Discussions with Family / Patient:  Discussed with patient at bedside regarding plan of care, when asked to update friends or family members patient politely declined    Time Spent for Care: 30 minutes  More than 50% of total time spent on counseling and coordination of care as described above  Current Length of Stay: 0 day(s)    Current Patient Status: Inpatient   Certification Statement: The patient, admitted on an observation basis, will now require > 2 midnight hospital stay due to Continued elevated troponin, IV heparin drip and cardiac catheterization tomorrow    Discharge Plan:  Not medically stable as above, pending cardiac catheterization tomorrow     Code Status: Level 1 - Full Code      Subjective:   Patient reports that he no longer has any chest pain  He denies any shortness of breath, nausea, vomiting or abdominal pain    Discussed admission imaging and blood work with patient in depth at bedside  All questions answered to the best my ability  Objective:     Vitals:   Temp (24hrs), Av 6 °F (37 °C), Min:97 5 °F (36 4 °C), Max:99 5 °F (37 5 °C)    Temp:  [97 5 °F (36 4 °C)-99 5 °F (37 5 °C)] 98 6 °F (37 °C)  HR:  [56-81] 67  Resp:  [16-26] 16  BP: (129-228)/() 132/68  SpO2:  [90 %-100 %] 92 %  Body mass index is 33 66 kg/m²  Input and Output Summary (last 24 hours):     No intake or output data in the 24 hours ending 20 1222    Physical Exam:     Physical Exam  Vitals signs reviewed  Constitutional:       General: He is not in acute distress  Appearance: Normal appearance  He is not toxic-appearing  Comments: Patient is in no acute distress sitting in his hospital bed resting comfortably  HENT:      Head: Normocephalic and atraumatic  Eyes:      Conjunctiva/sclera: Conjunctivae normal       Pupils: Pupils are equal, round, and reactive to light  Cardiovascular:      Rate and Rhythm: Normal rate and regular rhythm  Pulses: Normal pulses  Heart sounds: Murmur (Systolic) present  Pulmonary:      Effort: Pulmonary effort is normal  No respiratory distress  Breath sounds: Normal breath sounds  No wheezing or rales  Abdominal:      General: Abdomen is flat  Bowel sounds are normal  There is no distension  Palpations: Abdomen is soft  Tenderness: There is no abdominal tenderness  There is no guarding  Musculoskeletal:         General: No swelling  Skin:     General: Skin is warm and dry  Findings: No erythema  Neurological:      Mental Status: He is alert and oriented to person, place, and time  Mental status is at baseline     Psychiatric:         Mood and Affect: Mood normal          Additional Data:     Labs:    Results from last 7 days   Lab Units 08/15/20  1453   WBC Thousand/uL 10 90*   HEMOGLOBIN g/dL 15 4   HEMATOCRIT % 48 0   PLATELETS Thousands/uL 234   NEUTROS PCT % 84*   LYMPHS PCT % 9*   MONOS PCT % 4   EOS PCT % 1     Results from last 7 days   Lab Units 08/15/20  1453   POTASSIUM mmol/L 4 6   CHLORIDE mmol/L 103   CO2 mmol/L 29   BUN mg/dL 10   CREATININE mg/dL 1 07   CALCIUM mg/dL 10 1   ALK PHOS U/L 79   ALT U/L 29   AST U/L 20     Results from last 7 days   Lab Units 08/16/20  0011   INR  1 11       * I Have Reviewed All Lab Data Listed Above  * Additional Pertinent Lab Tests Reviewed:  All Labs Within Last 24 Hours Reviewed    Imaging:    Imaging Reports Reviewed Today Include:  Chest x-ray, CTA  Imaging Personally Reviewed by Myself Includes:  None    Recent Cultures (last 7 days):           Last 24 Hours Medication List:   Current Facility-Administered Medications   Medication Dose Route Frequency Provider Last Rate    acetaminophen  650 mg Oral Q4H PRN Amanda Ramon MD      aspirin  81 mg Oral Daily Amanda Ramon MD      atorvastatin  40 mg Oral QPM Amanda Ramon MD      calcium carbonate  1,000 mg Oral Daily PRN Amanda Ramon MD      diphenhydrAMINE (BENADRYL) IVPB  25 mg Intravenous Q6H PRN Amanda Ramon MD      [START ON 8/17/2020] diphenhydrAMINE  50 mg Oral Once Aline YENIFER Morocho      famotidine  20 mg Oral BID Amanda Ramon MD      heparin (porcine)  3-20 Units/kg/hr (Order-Specific) Intravenous Titrated Lindsey DAVIE Terry, DO 11 8 Units/kg/hr (08/16/20 0747)    heparin (porcine)  2,000 Units Intravenous Q1H PRN Lindsey L Gabrielolosknicolás, DO      heparin (porcine)  4,000 Units Intravenous Q1H PRN Lindsey DAVIE Mikolosky, DO      loratadine  10 mg Oral Daily Amanda Ramon MD      methylPREDNISolone sodium succinate  40 mg Intravenous [facility] Amanda Ramon MD      metoprolol tartrate  25 mg Oral Q12H [facility] Amanda Ramon MD      nitroglycerin  0 4 mg Sublingual Q5 Min PRN Amanda Ramon MD      ondansetron  4 mg Intravenous Q6H PRN Amanda Ramon MD      pantoprazole  40 mg Intravenous Q24H Patti Giraldo MD      simethicone  80 mg Oral 4x Daily PRN MD SUNSHINE Rodriges [facility] sodium chloride (PF)  3 mL Intravenous Q1H PRN Michaela Jones MD      sodium chloride (PF)  3 mL Intravenous Q1H PRN Michaela Jones MD          Today, Patient Was Seen By: Bluford Hashimoto, PA-C    ** Please Note: Dictation voice to text software may have been used in the creation of this document   **

## 2020-08-16 NOTE — UTILIZATION REVIEW
Initial Clinical Review    Admission: Date/Time/Statement:     OBSERVATION 8-15-20 1641 CHANGED TO INPATIENT   8-16-20  1114 FOR CONTINUED TREATMENT OF CHEST PAIN    Inpatient Admission  Once      Transfer Service: General Medicine       Question  Answer    Admitting Physician  IRVING LOVE    Level of Care  Med Surg    Estimated length of stay  More than 2 Midnights    Certification  I certify that inpatient services are medically necessary for this patient for a duration of greater than two midnights  See H&P and MD Progress Notes for additional information about the patient's course of treatment  ED Arrival Information     Expected Arrival Acuity Means of Arrival Escorted By Service Admission Type    - 8/15/2020 14:12 Emergent Walk-In Family Member General Medicine Emergency    Arrival Complaint    CHEST PAIN        Chief Complaint   Patient presents with    Chest Pain     Patient presents to ED with co non radiating chest pain and heart burn that started this morning  Patient reprots taking tums with no relief  Assessment/Plan:       72year old male presents to ed from home for evaluation and treatment of substernal chest pain with radiation to both arms and heartburn  On arrival  He reports pain 7-8/10  Clinical assessment significant for hypertension, troponin 0 22  Bnp 804,  Temp 99 5  Wbc 10  90    Ekg shows ST depression in 1, avl, v5, v6  Treated in ed with aspirin, iv pepcid,iv  9% ns 500 bolus, albuterol, iv benadryl, iv solumedrol, nitroglycerine sl x2  Admit to observation for nstemi  Plan includes:   Heparin gtt, aptt,  ekg with troponin, telemetry  Consult cardiology, iv solumedrol, echo    Addendum    Troponin continues to trend up now 11 49      New order:  Cardiac coronary angio/lhc/pci       ED Triage Vitals   08/15/20 1749 08/15/20 1454 08/15/20 1420 08/15/20 1420 08/15/20 1420   99 3 °F (37 4 °C) 77 17 (!) 228/100 97 %      Oral Monitor         No Pain          08/15/20 86 2 kg (190 lb)     Additional Vital Signs:    Date/Time   Temp   Pulse   Resp   BP   MAP (mmHg)   SpO2   O2 Device     08/16/20 07:37:07   98 6 °F (37 °C)   59   16   139/64   89   96 %        08/16/20 03:07:04   98 3 °F (36 8 °C)   56      133/63   86   90 %        08/15/20 23:17:15   97 5 °F (36 4 °C)   75   18   136/62   87   92 %   None (Room air)     08/15/20 19:58:36   99 5 °F (37 5 °C)   78   16   129/84   99   94 %        08/15/20 1749   99 3 °F (37 4 °C)                       08/15/20 17:40:57            150/67   95           08/15/20 1700      78   19   178/70  Abnormal     100   98 %   None (Room air)     08/15/20 1645      73   26  Abnormal     154/65   94   99 %        08/15/20 1630      81   22   182/80  Abnormal     115   99 %        08/15/20 1610      73   20   177/79  Abnormal        100 %   None (Room air)     08/15/20 1530      62   18   175/77  Abnormal     111   95 %   None (Room air)     08/15/20 1510      67   16   157/81      95 %   None (Room air)     08/15/20 1454      77   16   188/86  Abnormal        97 %   None (Room air)                 Pertinent Labs/Diagnostic Test Results:       8-15-20    EKG: there are no previous tracings available for comparison, ST depression in 1, AVL, V5, V6      2nd EKG: unchanged from previous tracings      3rd EKG: unchanged from previous tracings          CTA chest abdomen pelvis w wo contrast (Dissection)   Final (08/15 1624)         1  No evidence for acute pulmonary embolism or other acute intrathoracic abnormality  2   Mild atherosclerotic disease of the thoracic and abdominal aorta with no evidence for aneurysmal dilatation, vascular stenosis or occlusion, or dissection  3   7 mm left thyroid nodule which does not necessitate additional workup  4   Extensive aortic valve calcification should be clinically correlated  5   Additional findings as noted        X-ray chest 1 view portable      Final  (08/15 1540) No acute cardiopulmonary disease  Results from last 7 days   Lab Units 08/15/20  1453   WBC Thousand/uL 10 90*   HEMOGLOBIN g/dL 15 4   HEMATOCRIT % 48 0   PLATELETS Thousands/uL 234   NEUTROS ABS Thousands/µL 9 28*         Results from last 7 days   Lab Units 08/15/20  1453   SODIUM mmol/L 142   POTASSIUM mmol/L 4 6   CHLORIDE mmol/L 103   CO2 mmol/L 29   ANION GAP mmol/L 10   BUN mg/dL 10   CREATININE mg/dL 1 07   EGFR ml/min/1 73sq m 72   CALCIUM mg/dL 10 1     Results from last 7 days   Lab Units 08/15/20  1453   AST U/L 20   ALT U/L 29   ALK PHOS U/L 79   TOTAL PROTEIN g/dL 8 2   ALBUMIN g/dL 4 3   TOTAL BILIRUBIN mg/dL 0 50         Results from last 7 days   Lab Units 08/15/20  1453   GLUCOSE RANDOM mg/dL 113     Results from last 7 days   Lab Units 08/16/20  0634 08/16/20  0011 08/15/20  1955/20  1453   TROPONIN I ng/mL 11 49* 9 67* 5 96* 0 22*         Results from last 7 days   Lab Units 08/16/20  0634 08/16/20  0011   PROTIME seconds  --  13 8   INR   --  1 11   PTT seconds 65* 80*     Results from last 7 days   Lab Units 08/15/20  1453   NT-PRO BNP pg/mL 804*       ED Treatment:   Medication Administration from 08/15/2020 1411 to 08/15/2020 1730       Date/Time Order Dose Route Action     08/15/2020 1501 nitroglycerin (NITROSTAT) SL tablet 0 4 mg 0 4 mg Sublingual Given     08/15/2020 1453 nitroglycerin (NITROSTAT) SL tablet 0 4 mg 0 4 mg Sublingual Given     08/15/2020 1600 methylPREDNISolone sodium succinate (Solu-MEDROL) injection 125 mg 125 mg Intravenous Given     08/15/2020 1559 diphenhydrAMINE (BENADRYL) injection 50 mg 50 mg Intravenous Given     08/15/2020 1601 albuterol inhalation solution 5 mg 5 mg Nebulization Given     08/15/2020 1606 sodium chloride 0 9 % bolus 500 mL 500 mL Intravenous New Bag     08/15/2020 1610 famotidine (PEPCID) injection 20 mg 20 mg Intravenous Given     08/15/2020 1658 aspirin chewable tablet 324 mg 324 mg Oral Given        History reviewed   No pertinent past medical history  Present on Admission:   NSTEMI (non-ST elevated myocardial infarction) ([facility] [address] )   Accelerated hypertension   Elevated brain natriuretic peptide (BNP) level   Murmur, cardiac      Admitting Diagnosis:     Chest pain [R07 9]  NSTEMI (non-ST elevated myocardial infarction) ([facility]) [I21 4]  Allergic reaction, initial encounter [T78 40XA]     Age/Sex: 72 y o  male     Admission Orders:    Scheduled Medications:  aspirin, 81 mg, Oral, Daily  atorvastatin, 40 mg, Oral, QPM  famotidine, 20 mg, Oral, BID  loratadine, 10 mg, Oral, Daily  methylPREDNISolone sodium succinate, 40 mg, Intravenous, Q8H ARVIND  metoprolol tartrate, 25 mg, Oral, Q12H ARVIND  pantoprazole, 40 mg, Intravenous, Q24H ARVIND      Continuous IV Infusions:  heparin (porcine), 3-20 Units/kg/hr (Order-Specific), Intravenous, Titrated      PRN Meds:  acetaminophen, 650 mg, Oral, Q4H PRN  calcium carbonate, 1,000 mg, Oral, Daily PRN  diphenhydrAMINE (BENADRYL) IVPB, 25 mg, Intravenous, Q6H PRN  heparin (porcine), 2,000 Units, Intravenous, Q1H PRN  heparin (porcine), 4,000 Units, Intravenous, Q1H PRN  nitroglycerin, 0 4 mg, Sublingual, Q5 Min PRN  ondansetron, 4 mg, Intravenous, Q6H PRN  simethicone, 80 mg, Oral, 4x Daily PRN  sodium chloride (PF), 3 mL, Intravenous, Q1H PRN  sodium chloride (PF), 3 mL, Intravenous, Q1H PRN        IP CONSULT TO CARDIOLOGY    Network Utilization Review Department  [email or web address]  ATTENTION: Please call with any questions or concerns to [phone] and carefully listen to the prompts so that you are directed to the right person  All voicemails are confidential   Estelita Skipper all requests for admission clinical reviews, approved or denied determinations and any other requests to dedicated fax number below belonging to the campus where the patient is receiving treatment   List of dedicated fax numbers for the Facilities:  FACILITY NAME UR FAX NUMBER   ADMISSION DENIALS (Administrative/Medical Necessity) [address] (Maternity/NICU/Pediatrics) Luli [phone]   Rafael Camejo [phone]   Andrew Hess [phone]   [address] [phone]   [facility]  [phone]   [address] [facility], [facility]   [address] [phone]

## 2020-08-16 NOTE — ASSESSMENT & PLAN NOTE
· Mild, WBC 10 90 on admission   · Obtain AM lab work tomorrow  · May continue to elevate in setting of IV solu-medrol treatment  · No evidence of acute infectious etiology at this time   · Continue to trend CBC

## 2020-08-16 NOTE — ASSESSMENT & PLAN NOTE
· Pt presented with severe substernal chest pain, now resolved   · CTA with no evidence of acute PE or other intrathoracic abnormality   Extensive aortic valve calcification   · Troponin 0 22/5 96/9 67/11 49, trend to peak   · Currently on IV heparin gtt   · Continue ASA, statin and added Lopressor 25 mg BID  · Obtain lipid panel   · Cardiology following,  · Plan for cardiac cath tomorrow  · NPO after midnight  · Continue IV solu-medrol 40 mg Q8H and IV benadryl one hour prior to cath for pre-treatment   · ECHO pending

## 2020-08-16 NOTE — UTILIZATION REVIEW
Notification of Observation Admission/Observation Authorization Request   This is a Notification of Observation Admission for [address]  Be advised that this patient was admitted to our facility under Observation Status  Contact [facility] at [phone] for additional admission information  [facility] DEPT DEDICATED Sharifa De La Rosa [phone]  Patient Name:   Isabela Baldwin   YOB: 1955       [address]   [address]:   [address]   Tax ID: [id number]  NPI: [id number] Attending Provider/NPI:  Phone:  Address: Javier Chris [[id number]]  [phone]  Same as the HAILY/Vern Biggs [address]6 of Service Code: 25     Place of Service Name:  CPT Code for Observation:  On [address] / CPT [address]   Start Date:  Discharge Date & Time: No discharge date for patient encounter  Type of Admission: Observation Status Discharge Disposition   (if discharged): Final discharge disposition not confirmed   Patient Diagnoses: Chest pain [R07 9]  NSTEMI (non-ST elevated myocardial infarction) ([facility] [address] ) [I21 4]  Allergic reaction, initial encounter Rohini Almeida     Orders: Admission Orders (From admission, onward)     Ordered        08/15/20 1641  Place in Observation  Once                    Assigned Utilization Review Contact: [facility]  Utilization   Network Utilization Review Department  Phone: [phone]; Fax [phone]  Email: [email or web address]   ATTENTION PAYERS: Please call the assigned Utilization  directly with any questions or concerns ALL voicemails in the department are confidential  Send all requests for admission clinical reviews, approved or denied determinations and any other requests to dedicated fax number belonging to the campus where the patient is receiving treatment

## 2020-08-16 NOTE — CONSULTS
Consultation - Cardiology Team One  Yuliya Champion 72 y o  male MRN: [id number]  Unit/Bed#: -01 Encounter: [id number]    Inpatient consult to Cardiology  Consult performed by: YENIFER Graham  Consult ordered by: Opal Cantrell MD          Physician Requesting Consult: Opal Cantrell MD  Reason for Consult / Principal Problem:  Elevated troponin    Assessment/Plan:    1  NSTEMI, type to be determined  -troponin elevated at 0 22/5 96/9 67/11 49, continue to trend peak  -patient presented with chest pain with radiation to both arms  -continue heparin drip  -continue aspirin, statin, beta-blocker  -the patient to undergo coronary angiography tomorrow to evaluate etiology of elevated troponin  -given to contrast dye allergy patient to continue with IV methylprednisone 40 mg  q 8 hours and received Benadryl 50 mg tomorrow 1 hour prior to angiography  -TTE ordered and pending  -continue to monitor on telemetry  -cardiac diet    2  Hypertension, currently controlled  -continue metoprolol tartrate  -continue to monitor blood pressures    3  Contrast dye reaction   -occurred yesterday with contrast for CT of the chest  -patient to be prepped as above for coronary angiography tomorrow    HPI: 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 72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 o  year old male who has a history of hypertension, and allergic reaction to contrast dye after a CT of the chest yesterday who presented to the emergency room last evening with chest pain  He states that he was having severe heartburn and substernal chest pain with radiation to both his arms  In the emergency room was noted to be severely hypertensive with a blood pressure of 220/100  Troponin was noted to be elevated at 0 22/5 96/9 67/11 49, NT-proBNP 84  Chest x-ray without any acute cardiopulmonary findings  CT of the chest negative for any acute findings, but did show extensive aortic valve calcification      Given patient's symptoms and elevated troponin and coronary angiography was recommended  Risks and benefits of the procedure reviewed with the patient and his wife and they agreed to proceed  REVIEW OF SYSTEMS:  Constitutional:  Denies fever or chills   Eyes:  Denies change in visual acuity   HENT:  Denies nasal congestion or sore throat   Respiratory:  Denies cough or shortness of breath   Cardiovascular:  + chest pain, denies edema   GI:  Denies abdominal pain, nausea, vomiting, bloody stools or diarrhea   :  Denies dysuria, frequency, difficulty in micturition and nocturia  Musculoskeletal:  Denies back pain or joint pain   Neurologic:  Denies headache, focal weakness or sensory changes   Endocrine:  Denies polyuria or polydipsia   Lymphatic:  Denies swollen glands   Psychiatric:  Denies depression or anxiety     Historical Information   History reviewed  No pertinent past medical history  Past Surgical History:   Procedure Laterality Date    KNEE SURGERY       Social History     Substance and Sexual Activity   Alcohol Use Not Currently     Social History     Substance and Sexual Activity   Drug Use Not Currently     Social History     Tobacco Use   Smoking Status Never Smoker   Smokeless Tobacco Never Used       Family History: History reviewed  No pertinent family history      MEDS & ALLERGIES:  all current active meds have been reviewed and current meds:   Current Facility-Administered Medications   Medication Dose Route Frequency    acetaminophen (TYLENOL) tablet 650 mg  650 mg Oral Q4H PRN    aspirin chewable tablet 81 mg  81 mg Oral Daily    atorvastatin (LIPITOR) tablet 40 mg  40 mg Oral QPM    calcium carbonate (TUMS) chewable tablet 1,000 mg  1,000 mg Oral Daily PRN    diphenhydrAMINE (BENADRYL) 25 mg in sodium chloride 0 9 % 50 mL IVPB  25 mg Intravenous Q6H PRN    famotidine (PEPCID) tablet 20 mg  20 mg Oral BID    heparin (porcine) 25,000 units in 250 mL infusion (premix)  3-20 Units/kg/hr (Order-Specific) Intravenous Titrated    heparin (porcine) injection 2,000 Units  2,000 Units Intravenous Q1H PRN    heparin (porcine) injection 4,000 Units  4,000 Units Intravenous Q1H PRN    loratadine (CLARITIN) tablet 10 mg  10 mg Oral Daily    methylPREDNISolone sodium succinate (Solu-MEDROL) injection 40 mg  40 mg Intravenous Q8H [facility]    metoprolol tartrate (LOPRESSOR) tablet 25 mg  25 mg Oral Q12H [facility]    nitroglycerin (NITROSTAT) SL tablet 0 4 mg  0 4 mg Sublingual Q5 Min PRN    ondansetron (ZOFRAN) injection 4 mg  4 mg Intravenous Q6H PRN    pantoprazole (PROTONIX) injection 40 mg  40 mg Intravenous Q24H ARVIND    simethicone (MYLICON) chewable tablet 80 mg  80 mg Oral 4x Daily PRN    sodium chloride (PF) 0 9 % injection 3 mL  3 mL Intravenous Q1H PRN    sodium chloride (PF) 0 9 % injection 3 mL  3 mL Intravenous Q1H PRN     heparin (porcine), 3-20 Units/kg/hr (Order-Specific), Last Rate: 11 8 Units/kg/hr (20 0747)      Allergies   Allergen Reactions    Contrast Dye [Iodinated Diagnostic Agents] Anaphylaxis       OBJECTIVE:  Vitals:   Vitals:    20 0737   BP: 139/64   Pulse: 59   Resp: 16   Temp: 98 6 °F (37 °C)   SpO2: 96%     Body mass index is 33 66 kg/m²  Systolic (79YBA), VTX:878 , Min:129 , CQ     Diastolic (29GTK), KLY:82, Min:62, Max:100    No intake or output data in the 24 hours ending 20 0946  Weight (last 2 days)     Date/Time   Weight    08/15/20 1420   86 2 (190)            Invasive Devices     Peripheral Intravenous Line            Peripheral IV 08/15/20 Left Antecubital less than 1 day    Peripheral IV 08/15/20 Right Antecubital less than 1 day                PHYSICAL EXAMS:  General:  Patient is not in acute distress, laying in the bed comfortably, awake, alert responding to commands  Head: Normocephalic, Atraumatic     HEENT: White sclera, pink conjunctiva  Neck:  Supple, no neck vein distention  Respiratory: clear to P/A  Cardiovascular:  PMI normal, S1-S2 normal, No  Murmurs, thrills, gallops, rubs, regular rhythm  GI:  Abdomen soft, non-tender, non-distended  Extremities: No edema, normal pulses  Integument:  No skin rashes or ulceration  Lymphatic:  No cervical or inguinal lymphadenopathy  Neurologic:  Patient is awake alert, responding to command, oriented to person, place and time     LABORATORY RESULTS:  Results from last 7 days   Lab Units 08/16/20  0634 08/16/20  0011 08/15/20  1955   TROPONIN I ng/mL 11 49* 9 67* 5 96*     CBC with diff:   Results from last 7 days   Lab Units 08/15/20  1453   WBC Thousand/uL 10 90*   HEMOGLOBIN g/dL 15 4   HEMATOCRIT % 48 0   MCV fL 85   PLATELETS Thousands/uL 234   MCH pg 27 4   MCHC g/dL 32 1   RDW % 13 2   MPV fL 12 0   NRBC AUTO /100 WBCs 0       CMP:  Results from last 7 days   Lab Units 08/15/20  1453   POTASSIUM mmol/L 4 6   CHLORIDE mmol/L 103   CO2 mmol/L 29   BUN mg/dL 10   CREATININE mg/dL 1 07   CALCIUM mg/dL 10 1   AST U/L 20   ALT U/L 29   ALK PHOS U/L 79   EGFR ml/min/1 73sq m 72       BMP:  Results from last 7 days   Lab Units 08/15/20  1453   POTASSIUM mmol/L 4 6   CHLORIDE mmol/L 103   CO2 mmol/L 29   BUN mg/dL 10   CREATININE mg/dL 1 07   CALCIUM mg/dL 10 1       Results from last 7 days   Lab Units 08/15/20  1453   NT-PRO BNP pg/mL 804*                  Results from last 7 days   Lab Units 08/16/20  0011   INR  1 11       Lipid Profile:   No results found for: CHOL  No results found for: HDL  No results found for: LDLCALC  No results found for: TRIG    Cardiac testing:   No results found for this or any previous visit  No results found for this or any previous visit  No procedure found  No results found for this or any previous visit  Imaging:   I have personally reviewed pertinent reports          EKG reviewed personally:  None available for my review    Telemetry reviewed personally:   Currently sinus rhythm in the 60s      Code Status: Level 1 - Full Code    Counseling / Coordination of Care  Total floor / unit time spent today 20 minutes  Greater than 50% of total time was spent with the patient and / or family counseling and / or coordination of care  A description of the counseling / coordination of care: Review of history, current assessment, development of a plan      YENIFER Bueno  8/16/2020,9:46 AM

## 2020-08-16 NOTE — RESPIRATORY THERAPY NOTE
RT Protocol Note  Ray Prabhakar 72 y o  male MRN: [id number]  Unit/Bed#: -01 Encounter: [id number]    Assessment    Principal Problem:    NSTEMI (non-ST elevated myocardial infarction) ([address] )  Active Problems:    Accelerated hypertension    Elevated brain natriuretic peptide (BNP) level    Murmur, cardiac    Allergic reaction to contrast dye      Home Pulmonary Medications:    Home Devices/Therapy: (no resp therapy )    History reviewed  No pertinent past medical history    Social History     Socioeconomic History    Marital status: /Civil Union     Spouse name: None    Number of children: None    Years of education: None    Highest education level: None   Occupational History    None   Social Needs    Financial resource strain: None    Food insecurity     Worry: None     Inability: None    Transportation needs     Medical: None     Non-medical: None   Tobacco Use    Smoking status: Never Smoker    Smokeless tobacco: Never Used   Substance and Sexual Activity    Alcohol use: Not Currently    Drug use: Not Currently    Sexual activity: Not Currently   Lifestyle    Physical activity     Days per week: None     Minutes per session: None    Stress: None   Relationships    Social connections     Talks on phone: None     Gets together: None     Attends [employer] service: None     Active member of club or organization: None     Attends meetings of clubs or organizations: None     Relationship status: None    Intimate partner violence     Fear of current or ex partner: None     Emotionally abused: None     Physically abused: None     Forced sexual activity: None   Other Topics Concern    None   Social History Narrative    None       Subjective         Objective    Physical Exam:   Assessment Type: Assess only  General Appearance: Sleeping  Respiratory Pattern: Normal  Chest Assessment: Chest expansion symmetrical  Bilateral Breath Sounds: Clear  O2 Device: ra    Vitals:  Blood pressure 129/84, pulse 78, temperature 99 5 °F (37 5 °C), resp  rate 16, height 5' 3" (1 6 m), weight 86 2 kg (190 lb), SpO2 94 %  Imaging and other studies: I have personally reviewed pertinent reports        O2 Device: ra     Plan    Respiratory Plan: Discontinue Protocol        Resp Comments: no resp hx no indication for resp therapy will discont pt asleep no distress

## 2020-08-16 NOTE — QUICK NOTE
Message from Alexander 32 continues to rise and last level 9 67  patient chest pain 1/10  On heparin gtt, cardiology consulted  Will continue to trend trop until peak  Monitor

## 2020-08-17 ENCOUNTER — HOSPITAL ENCOUNTER (INPATIENT)
Facility: HOSPITAL | Age: 65
LOS: 5 days | Discharge: HOME HEALTH | DRG: 219 | End: 2020-08-22
Attending: FAMILY MEDICINE | Admitting: THORACIC SURGERY (CARDIOTHORACIC VASCULAR SURGERY)
Payer: COMMERCIAL

## 2020-08-17 ENCOUNTER — APPOINTMENT (INPATIENT)
Dept: INTERVENTIONAL RADIOLOGY/VASCULAR | Facility: HOSPITAL | Age: 65
DRG: 282 | End: 2020-08-17
Payer: COMMERCIAL

## 2020-08-17 VITALS
WEIGHT: 190 LBS | RESPIRATION RATE: 18 BRPM | BODY MASS INDEX: 33.66 KG/M2 | DIASTOLIC BLOOD PRESSURE: 72 MMHG | HEIGHT: 63 IN | OXYGEN SATURATION: 95 % | TEMPERATURE: 97.9 F | HEART RATE: 77 BPM | SYSTOLIC BLOOD PRESSURE: 138 MMHG

## 2020-08-17 DIAGNOSIS — I25.10 CORONARY ARTERY DISEASE INVOLVING NATIVE CORONARY ARTERY OF NATIVE HEART WITHOUT ANGINA PECTORIS: ICD-10-CM

## 2020-08-17 DIAGNOSIS — Z95.1 S/P CABG (CORONARY ARTERY BYPASS GRAFT): ICD-10-CM

## 2020-08-17 DIAGNOSIS — Z95.2 S/P AVR (AORTIC VALVE REPLACEMENT): ICD-10-CM

## 2020-08-17 DIAGNOSIS — I35.0 NONRHEUMATIC AORTIC VALVE STENOSIS: ICD-10-CM

## 2020-08-17 DIAGNOSIS — I21.4 NSTEMI (NON-ST ELEVATED MYOCARDIAL INFARCTION) (HCC): Primary | ICD-10-CM

## 2020-08-17 DIAGNOSIS — K08.9 DENTAL DISEASE: ICD-10-CM

## 2020-08-17 DIAGNOSIS — Z01.818 PRE-OP EVALUATION: ICD-10-CM

## 2020-08-17 PROBLEM — R01.1 MURMUR, CARDIAC: Status: RESOLVED | Noted: 2020-08-15 | Resolved: 2020-08-17

## 2020-08-17 LAB
ANION GAP SERPL CALCULATED.3IONS-SCNC: 9 MMOL/L (ref 4–13)
APTT PPP: 24 SECONDS (ref 23–37)
APTT PPP: 56 SECONDS (ref 23–37)
BUN SERPL-MCNC: 22 MG/DL (ref 5–25)
CALCIUM SERPL-MCNC: 9 MG/DL (ref 8.3–10.1)
CHLORIDE SERPL-SCNC: 106 MMOL/L (ref 100–108)
CHOLEST SERPL-MCNC: 198 MG/DL (ref 50–200)
CHOLEST SERPL-MCNC: 205 MG/DL (ref 50–200)
CO2 SERPL-SCNC: 26 MMOL/L (ref 21–32)
CREAT SERPL-MCNC: 1.12 MG/DL (ref 0.6–1.3)
ERYTHROCYTE [DISTWIDTH] IN BLOOD BY AUTOMATED COUNT: 13.4 % (ref 11.6–15.1)
GFR SERPL CREATININE-BSD FRML MDRD: 69 ML/MIN/1.73SQ M
GLUCOSE SERPL-MCNC: 126 MG/DL (ref 65–140)
HCT VFR BLD AUTO: 43.9 % (ref 36.5–49.3)
HDLC SERPL-MCNC: 36 MG/DL
HDLC SERPL-MCNC: 39 MG/DL
HGB BLD-MCNC: 14.4 G/DL (ref 12–17)
INR PPP: 0.99 (ref 0.84–1.19)
KCT BLD-ACNC: 132 SEC (ref 89–137)
KCT BLD-ACNC: 201 SEC (ref 89–137)
LDLC SERPL CALC-MCNC: 109 MG/DL (ref 0–100)
LDLC SERPL CALC-MCNC: 132 MG/DL (ref 0–100)
MCH RBC QN AUTO: 28 PG (ref 26.8–34.3)
MCHC RBC AUTO-ENTMCNC: 32.8 G/DL (ref 31.4–37.4)
MCV RBC AUTO: 85 FL (ref 82–98)
PLATELET # BLD AUTO: 230 THOUSANDS/UL (ref 149–390)
PMV BLD AUTO: 12 FL (ref 8.9–12.7)
POTASSIUM SERPL-SCNC: 4.2 MMOL/L (ref 3.5–5.3)
PROTHROMBIN TIME: 13.1 SECONDS (ref 11.6–14.5)
RBC # BLD AUTO: 5.14 MILLION/UL (ref 3.88–5.62)
SODIUM SERPL-SCNC: 141 MMOL/L (ref 136–145)
SPECIMEN SOURCE: ABNORMAL
SPECIMEN SOURCE: NORMAL
TRIGL SERPL-MCNC: 150 MG/DL
TRIGL SERPL-MCNC: 285 MG/DL
WBC # BLD AUTO: 19.16 THOUSAND/UL (ref 4.31–10.16)

## 2020-08-17 PROCEDURE — 93460 R&L HRT ART/VENTRICLE ANGIO: CPT | Performed by: INTERNAL MEDICINE

## 2020-08-17 PROCEDURE — 99232 SBSQ HOSP IP/OBS MODERATE 35: CPT | Performed by: INTERNAL MEDICINE

## 2020-08-17 PROCEDURE — 4A023N8 MEASUREMENT OF CARDIAC SAMPLING AND PRESSURE, BILATERAL, PERCUTANEOUS APPROACH: ICD-10-PCS | Performed by: INTERNAL MEDICINE

## 2020-08-17 PROCEDURE — C1894 INTRO/SHEATH, NON-LASER: HCPCS | Performed by: NURSE PRACTITIONER

## 2020-08-17 PROCEDURE — C1769 GUIDE WIRE: HCPCS | Performed by: NURSE PRACTITIONER

## 2020-08-17 PROCEDURE — 93571 IV DOP VEL&/PRESS C FLO 1ST: CPT | Performed by: INTERNAL MEDICINE

## 2020-08-17 PROCEDURE — 4A033BC MEASUREMENT OF ARTERIAL PRESSURE, CORONARY, PERCUTANEOUS APPROACH: ICD-10-PCS | Performed by: INTERNAL MEDICINE

## 2020-08-17 PROCEDURE — 85610 PROTHROMBIN TIME: CPT | Performed by: FAMILY MEDICINE

## 2020-08-17 PROCEDURE — B211YZZ FLUOROSCOPY OF MULTIPLE CORONARY ARTERIES USING OTHER CONTRAST: ICD-10-PCS | Performed by: INTERNAL MEDICINE

## 2020-08-17 PROCEDURE — C9113 INJ PANTOPRAZOLE SODIUM, VIA: HCPCS | Performed by: INTERNAL MEDICINE

## 2020-08-17 PROCEDURE — 85730 THROMBOPLASTIN TIME PARTIAL: CPT | Performed by: FAMILY MEDICINE

## 2020-08-17 PROCEDURE — 80061 LIPID PANEL: CPT | Performed by: FAMILY MEDICINE

## 2020-08-17 PROCEDURE — NC001 PR NO CHARGE: Performed by: PHYSICIAN ASSISTANT

## 2020-08-17 PROCEDURE — C1887 CATHETER, GUIDING: HCPCS | Performed by: NURSE PRACTITIONER

## 2020-08-17 PROCEDURE — 93458 L HRT ARTERY/VENTRICLE ANGIO: CPT | Performed by: NURSE PRACTITIONER

## 2020-08-17 PROCEDURE — 85730 THROMBOPLASTIN TIME PARTIAL: CPT | Performed by: INTERNAL MEDICINE

## 2020-08-17 PROCEDURE — 80048 BASIC METABOLIC PNL TOTAL CA: CPT | Performed by: PHYSICIAN ASSISTANT

## 2020-08-17 PROCEDURE — 93451 RIGHT HEART CATH: CPT | Performed by: NURSE PRACTITIONER

## 2020-08-17 PROCEDURE — 99223 1ST HOSP IP/OBS HIGH 75: CPT | Performed by: FAMILY MEDICINE

## 2020-08-17 PROCEDURE — 93571 IV DOP VEL&/PRESS C FLO 1ST: CPT | Performed by: NURSE PRACTITIONER

## 2020-08-17 PROCEDURE — 85347 COAGULATION TIME ACTIVATED: CPT

## 2020-08-17 PROCEDURE — 80061 LIPID PANEL: CPT | Performed by: NURSE PRACTITIONER

## 2020-08-17 PROCEDURE — 85027 COMPLETE CBC AUTOMATED: CPT | Performed by: PHYSICIAN ASSISTANT

## 2020-08-17 PROCEDURE — 82810 BLOOD GASES O2 SAT ONLY: CPT | Performed by: NURSE PRACTITIONER

## 2020-08-17 PROCEDURE — 99152 MOD SED SAME PHYS/QHP 5/>YRS: CPT | Performed by: INTERNAL MEDICINE

## 2020-08-17 PROCEDURE — 99153 MOD SED SAME PHYS/QHP EA: CPT | Performed by: NURSE PRACTITIONER

## 2020-08-17 PROCEDURE — 99152 MOD SED SAME PHYS/QHP 5/>YRS: CPT | Performed by: NURSE PRACTITIONER

## 2020-08-17 RX ORDER — ONDANSETRON 2 MG/ML
4 INJECTION INTRAMUSCULAR; INTRAVENOUS EVERY 6 HOURS PRN
Status: CANCELLED | OUTPATIENT
Start: 2020-08-17

## 2020-08-17 RX ORDER — CALCIUM CARBONATE 200(500)MG
1000 TABLET,CHEWABLE ORAL DAILY PRN
Status: DISCONTINUED | OUTPATIENT
Start: 2020-08-17 | End: 2020-08-19 | Stop reason: HOSPADM

## 2020-08-17 RX ORDER — SODIUM CHLORIDE 9 MG/ML
3 INJECTION INTRAVENOUS
Status: CANCELLED | OUTPATIENT
Start: 2020-08-17

## 2020-08-17 RX ORDER — NITROGLYCERIN 20 MG/100ML
INJECTION INTRAVENOUS CODE/TRAUMA/SEDATION MEDICATION
Status: COMPLETED | OUTPATIENT
Start: 2020-08-17 | End: 2020-08-17

## 2020-08-17 RX ORDER — SODIUM CHLORIDE 9 MG/ML
50 INJECTION, SOLUTION INTRAVENOUS CONTINUOUS
Status: DISCONTINUED | OUTPATIENT
Start: 2020-08-17 | End: 2020-08-17

## 2020-08-17 RX ORDER — ONDANSETRON 2 MG/ML
4 INJECTION INTRAMUSCULAR; INTRAVENOUS EVERY 6 HOURS PRN
Status: DISCONTINUED | OUTPATIENT
Start: 2020-08-17 | End: 2020-08-19 | Stop reason: HOSPADM

## 2020-08-17 RX ORDER — ACETAMINOPHEN 325 MG/1
650 TABLET ORAL EVERY 4 HOURS PRN
Status: DISCONTINUED | OUTPATIENT
Start: 2020-08-17 | End: 2020-08-19 | Stop reason: HOSPADM

## 2020-08-17 RX ORDER — SODIUM CHLORIDE 9 MG/ML
50 INJECTION, SOLUTION INTRAVENOUS CONTINUOUS
Status: CANCELLED | OUTPATIENT
Start: 2020-08-17 | End: 2020-08-17

## 2020-08-17 RX ORDER — FAMOTIDINE 20 MG/1
20 TABLET, FILM COATED ORAL 2 TIMES DAILY
Status: DISCONTINUED | OUTPATIENT
Start: 2020-08-18 | End: 2020-08-19 | Stop reason: HOSPADM

## 2020-08-17 RX ORDER — LIDOCAINE WITH 8.4% SOD BICARB 0.9%(10ML)
SYRINGE (ML) INJECTION CODE/TRAUMA/SEDATION MEDICATION
Status: COMPLETED | OUTPATIENT
Start: 2020-08-17 | End: 2020-08-17

## 2020-08-17 RX ORDER — ATORVASTATIN CALCIUM 40 MG/1
40 TABLET, FILM COATED ORAL EVERY EVENING
Status: CANCELLED | OUTPATIENT
Start: 2020-08-17

## 2020-08-17 RX ORDER — DIPHENHYDRAMINE HYDROCHLORIDE 50 MG/ML
25 INJECTION INTRAMUSCULAR; INTRAVENOUS EVERY 6 HOURS PRN
Status: DISCONTINUED | OUTPATIENT
Start: 2020-08-17 | End: 2020-08-19 | Stop reason: HOSPADM

## 2020-08-17 RX ORDER — SIMETHICONE 80 MG
80 TABLET,CHEWABLE ORAL 4 TIMES DAILY PRN
Status: DISCONTINUED | OUTPATIENT
Start: 2020-08-17 | End: 2020-08-19 | Stop reason: HOSPADM

## 2020-08-17 RX ORDER — NITROGLYCERIN 0.4 MG/1
0.4 TABLET SUBLINGUAL
Status: DISCONTINUED | OUTPATIENT
Start: 2020-08-17 | End: 2020-08-19 | Stop reason: HOSPADM

## 2020-08-17 RX ORDER — ASPIRIN 81 MG/1
81 TABLET, CHEWABLE ORAL DAILY
Status: CANCELLED | OUTPATIENT
Start: 2020-08-18

## 2020-08-17 RX ORDER — ASPIRIN 81 MG/1
81 TABLET, CHEWABLE ORAL DAILY
Status: DISCONTINUED | OUTPATIENT
Start: 2020-08-18 | End: 2020-08-19 | Stop reason: HOSPADM

## 2020-08-17 RX ORDER — ATORVASTATIN CALCIUM 40 MG/1
40 TABLET, FILM COATED ORAL EVERY EVENING
Status: DISCONTINUED | OUTPATIENT
Start: 2020-08-18 | End: 2020-08-19 | Stop reason: HOSPADM

## 2020-08-17 RX ORDER — NITROGLYCERIN 0.4 MG/1
0.4 TABLET SUBLINGUAL
Status: CANCELLED | OUTPATIENT
Start: 2020-08-17

## 2020-08-17 RX ORDER — FENTANYL CITRATE 50 UG/ML
INJECTION, SOLUTION INTRAMUSCULAR; INTRAVENOUS CODE/TRAUMA/SEDATION MEDICATION
Status: COMPLETED | OUTPATIENT
Start: 2020-08-17 | End: 2020-08-17

## 2020-08-17 RX ORDER — SIMETHICONE 80 MG
80 TABLET,CHEWABLE ORAL 4 TIMES DAILY PRN
Status: CANCELLED | OUTPATIENT
Start: 2020-08-17

## 2020-08-17 RX ORDER — SODIUM CHLORIDE 9 MG/ML
3 INJECTION INTRAVENOUS
Status: DISCONTINUED | OUTPATIENT
Start: 2020-08-17 | End: 2020-08-19 | Stop reason: HOSPADM

## 2020-08-17 RX ORDER — HEPARIN SODIUM 10000 [USP'U]/100ML
3-20 INJECTION, SOLUTION INTRAVENOUS
Status: DISCONTINUED | OUTPATIENT
Start: 2020-08-17 | End: 2020-08-19 | Stop reason: HOSPADM

## 2020-08-17 RX ORDER — PANTOPRAZOLE SODIUM 40 MG/1
40 INJECTION, POWDER, FOR SOLUTION INTRAVENOUS
Status: DISCONTINUED | OUTPATIENT
Start: 2020-08-18 | End: 2020-08-18

## 2020-08-17 RX ORDER — PANTOPRAZOLE SODIUM 40 MG/1
40 INJECTION, POWDER, FOR SOLUTION INTRAVENOUS
Status: CANCELLED | OUTPATIENT
Start: 2020-08-18

## 2020-08-17 RX ORDER — HEPARIN SODIUM 10000 [USP'U]/100ML
3-20 INJECTION, SOLUTION INTRAVENOUS
Status: CANCELLED | OUTPATIENT
Start: 2020-08-17

## 2020-08-17 RX ORDER — DIPHENHYDRAMINE HYDROCHLORIDE 50 MG/ML
25 INJECTION INTRAMUSCULAR; INTRAVENOUS EVERY 6 HOURS PRN
Status: DISCONTINUED | OUTPATIENT
Start: 2020-08-17 | End: 2020-08-17

## 2020-08-17 RX ORDER — HEPARIN SODIUM 1000 [USP'U]/ML
INJECTION, SOLUTION INTRAVENOUS; SUBCUTANEOUS CODE/TRAUMA/SEDATION MEDICATION
Status: COMPLETED | OUTPATIENT
Start: 2020-08-17 | End: 2020-08-17

## 2020-08-17 RX ORDER — FAMOTIDINE 20 MG/1
20 TABLET, FILM COATED ORAL 2 TIMES DAILY
Status: CANCELLED | OUTPATIENT
Start: 2020-08-17

## 2020-08-17 RX ORDER — MIDAZOLAM HYDROCHLORIDE 2 MG/2ML
INJECTION, SOLUTION INTRAMUSCULAR; INTRAVENOUS CODE/TRAUMA/SEDATION MEDICATION
Status: COMPLETED | OUTPATIENT
Start: 2020-08-17 | End: 2020-08-17

## 2020-08-17 RX ORDER — HEPARIN SODIUM 10000 [USP'U]/100ML
3-20 INJECTION, SOLUTION INTRAVENOUS
Status: DISCONTINUED | OUTPATIENT
Start: 2020-08-17 | End: 2020-08-17 | Stop reason: HOSPADM

## 2020-08-17 RX ORDER — ACETAMINOPHEN 325 MG/1
650 TABLET ORAL EVERY 4 HOURS PRN
Status: CANCELLED | OUTPATIENT
Start: 2020-08-17

## 2020-08-17 RX ORDER — LORATADINE 10 MG/1
10 TABLET ORAL DAILY
Status: CANCELLED | OUTPATIENT
Start: 2020-08-18

## 2020-08-17 RX ORDER — CALCIUM CARBONATE 200(500)MG
1000 TABLET,CHEWABLE ORAL DAILY PRN
Status: CANCELLED | OUTPATIENT
Start: 2020-08-17

## 2020-08-17 RX ORDER — LORATADINE 10 MG/1
10 TABLET ORAL DAILY
Status: DISCONTINUED | OUTPATIENT
Start: 2020-08-18 | End: 2020-08-19 | Stop reason: HOSPADM

## 2020-08-17 RX ADMIN — ATORVASTATIN CALCIUM 40 MG: 40 TABLET, FILM COATED ORAL at 17:44

## 2020-08-17 RX ADMIN — SODIUM CHLORIDE 50 ML/HR: 0.9 INJECTION, SOLUTION INTRAVENOUS at 12:40

## 2020-08-17 RX ADMIN — LORATADINE 10 MG: 10 TABLET ORAL at 08:19

## 2020-08-17 RX ADMIN — ASPIRIN 81 MG 81 MG: 81 TABLET ORAL at 08:19

## 2020-08-17 RX ADMIN — HEPARIN SODIUM 2000 UNITS: 1000 INJECTION INTRAVENOUS; SUBCUTANEOUS at 08:24

## 2020-08-17 RX ADMIN — MIDAZOLAM HYDROCHLORIDE 1 MG: 1 INJECTION, SOLUTION INTRAMUSCULAR; INTRAVENOUS at 10:44

## 2020-08-17 RX ADMIN — Medication 2 ML: at 11:31

## 2020-08-17 RX ADMIN — HEPARIN SODIUM 5000 UNITS: 1000 INJECTION INTRAVENOUS; SUBCUTANEOUS at 10:56

## 2020-08-17 RX ADMIN — METOPROLOL TARTRATE 25 MG: 25 TABLET, FILM COATED ORAL at 08:19

## 2020-08-17 RX ADMIN — HEPARIN SODIUM AND DEXTROSE 13.8 UNITS/KG/HR: 10000; 5 INJECTION INTRAVENOUS at 22:02

## 2020-08-17 RX ADMIN — NITROGLYCERIN 200 MCG: 20 INJECTION INTRAVENOUS at 10:52

## 2020-08-17 RX ADMIN — FAMOTIDINE 20 MG: 20 TABLET ORAL at 17:44

## 2020-08-17 RX ADMIN — HYDROCORTISONE SODIUM SUCCINATE 100 MG: 100 INJECTION, POWDER, FOR SOLUTION INTRAMUSCULAR; INTRAVENOUS at 10:36

## 2020-08-17 RX ADMIN — FENTANYL CITRATE 25 MCG: 50 INJECTION, SOLUTION INTRAMUSCULAR; INTRAVENOUS at 10:44

## 2020-08-17 RX ADMIN — METOPROLOL TARTRATE 25 MG: 25 TABLET, FILM COATED ORAL at 22:01

## 2020-08-17 RX ADMIN — FAMOTIDINE 20 MG: 20 TABLET ORAL at 08:19

## 2020-08-17 RX ADMIN — IODIXANOL 100 ML: 320 INJECTION, SOLUTION INTRAVASCULAR at 12:01

## 2020-08-17 RX ADMIN — DIPHENHYDRAMINE HCL 50 MG: 25 TABLET, COATED ORAL at 09:15

## 2020-08-17 RX ADMIN — METHYLPREDNISOLONE SODIUM SUCCINATE 40 MG: 40 INJECTION, POWDER, FOR SOLUTION INTRAMUSCULAR; INTRAVENOUS at 05:52

## 2020-08-17 RX ADMIN — Medication 2 ML: at 10:50

## 2020-08-17 RX ADMIN — PANTOPRAZOLE SODIUM 40 MG: 40 INJECTION, POWDER, FOR SOLUTION INTRAVENOUS at 09:16

## 2020-08-17 RX ADMIN — HEPARIN SODIUM 2500 UNITS: 1000 INJECTION INTRAVENOUS; SUBCUTANEOUS at 11:13

## 2020-08-17 NOTE — DISCHARGE SUMMARY
Discharge- Nimisha Jain 1955, 72 y o  male MRN: [id number]    Unit/Bed#: -01 Encounter: [id number]    Primary Care Provider: Ada Ku MD   Date and time admitted to hospital: 8/15/2020  2:20 PM      DOS: 8/17/2020    * Chest pain  Assessment & Plan  · Pt presented with severe substernal chest pain, now resolved   · CTA with no evidence of acute PE or other intrathoracic abnormality   Extensive aortic valve calcification   · Troponin 0 22/5 96/9 67/11 49, peak of 13 16   · Continue ASA, statin and Lopressor 25 mg BID  · Lipid panel pending   · Cardiology following,  · Cardiac catheterization performed today - evidence of mid % occlusion, proximal LAD 50% stenosis and focal mid LAD 50% stenosis   · ECHO with EF 60%, moderate to severe concentric hypertrophy, severe aortic stenosis  · Recommending transfer to [facility] for CT surgery evaluation     NSTEMI (non-ST elevated myocardial infarction) ([facility] [address] )  Assessment & Plan  · Pt presented with chest pain symptoms  · EKG unable to viewed at this time, however upon review of ED provider's note, pt noted to have T wave inversions in the lateral leads, no prior to compare   · Troponin peak of 13 19  · Cardiology following as above  · Telemetry monitoring   · Continue ASA, statin, BB   · PRN nitro   · S/p cardiac cath, recommending transfer to B for CT surgery/CABG evaluation     Leukocytosis  Assessment & Plan  · WBC worsened to 19 16 today   · Likely in setting of IV solu-medrol treatment   · Obtain repeat CBC in the AM to trend   · No evidence of acute infectious etiology at this time     Allergic reaction to contrast dye  Assessment & Plan  · Pt with allergic reaction to IV dye in the ED while obtaining CTA  · Noted to have periorbital swelling and shortness of breath/wheezing which has now completely resolved    · Given pepcid, IV solu-medrol and PRN IV Benadryl prior to cardiac catheterization  · D/c IV solu-medrol, continue with claritin and pepcid Murmur, cardiac  Assessment & Plan  · Systolic murmur noted on exam  · ECHO with evidence of severe aortic stenosis  · Cardiology following, appreciate recommendations  · Additional plan as above    Elevated brain natriuretic peptide (BNP) level  Assessment & Plan  ·  on admission   · ECHO with preserved EF, moderate to severe concentric hypertrophy   · CXR unremarkable  · Appears euvolemic at this time, continue to monitor    Accelerated hypertension  Assessment & Plan  · POA, /100 on admission  · Not previously maintained on any antihypertensives at home   · Significant improvement noted with Lopressor 25 mg BID, continue for now  · Cardiology following  · Continue to monitor      Discharging Physician / Practitioner: Porfirio Pringle PA-C  PCP: Deloris Kearney MD  Admission Date:   Admission Orders (From admission, onward)     Ordered        08/16/20 1114  Inpatient Admission  Once         08/15/20 1641  Place in Observation  Once                   Discharge Date: 08/17/20    Resolved Problems  Date Reviewed: 8/17/2020    None          Consultations During Hospital Stay:  · Cardiology     Procedures Performed:   · CXR 8/15 - No acute cardiopulmonary disease  · CTA c/a/p 8/15 - No evidence for acute pulmonary embolism or other acute intrathoracic abnormality  Mild atherosclerotic disease of the thoracic and abdominal aorta with no evidence for aneurysmal dilatation, vascular stenosis or occlusion  7 mm left thyroid nodule which does not necessitate additional work up  Extensive aortic valve calcification should be clinically correlated  · ECHO 8/16 - EF estimated to be 60%  No regional wall motion abnormalities  Moderate-severe concentric hypertrophy  Severe aortic stenosis     · Cardiac catheterization 8/17 - Mid % occlusion, proximal LAD 50% stenosis and focal mid LAD 50% stenosis    Significant Findings / Test Results:   · Troponin 0 22/5 96/9 67/11 49/12 79/9 78/13 16  · Leukocytosis worsened to 19 16 likely in setting of IV solu-medrol therapy     Incidental Findings:   · None     Test Results Pending at Discharge (will require follow up): · Lipid panel      Outpatient Tests Requested:  · N/A    Complications:  None    Reason for Admission: Chest pain     Hospital Course:     Brando Burgos is a 72 y o  male patient with no significant past medical history who originally presented to the hospital on 8/15/2020 due to chest pain  Patient was having substernal chest pain and heartburn with radiation to both arms and the back  His blood pressure was noted to be significantly elevated in the ED as well  Troponins noted to be elevated and therefore CTA was performed in the ER that was negative for PE or dissection  However patient did have an allergic reaction including shortness of breath and periorbital swelling to contrast dye during CTA and was placed on IV Solu-Medrol, Pepcid and Benadryl with improvement  Patient was placed on IV heparin drip in setting of peak troponin of 13  He was evaluated by cardiology and started on aspirin, statin and beta-blocker with improvement of chest pain symptoms and blood pressure  Patient was then scheduled for cardiac catheterization and noted to have significant CAD requiring transfer to \[facility]\"" for CABG evaluation  Pt was maintained on IV solu-medrol prior to catheterization which has now been discontinued  Please see above list of diagnoses and related plan for additional information  Condition at Discharge: stable     Discharge Day Visit / Exam:     * Please refer to separate progress note for these details *    Discussion with Family:  Patient agreeable to transfer to \[facility]\""    Discharge instructions/Information to patient and family:   See after visit summary for information provided to patient and family  Provisions for Follow-Up Care:  See after visit summary for information related to follow-up care and any pertinent home health orders  Disposition:     [address] Transfer to [facility] [address] [place] [address] to [facility]:   · Not Applicable to this Patient - Not Applicable to this Patient    Planned Readmission: None     Discharge Statement:  I spent 40 minutes discharging the patient  This time was spent on the day of discharge  I had direct contact with the patient on the day of discharge  Greater than 50% of the total time was spent examining patient, answering all patient questions, arranging and discussing plan of care with patient as well as directly providing post-discharge instructions  Additional time then spent on discharge activities  Discharge Medications:  See after visit summary for reconciled discharge medications provided to patient and family        ** Please Note: This note has been constructed using a voice recognition system **

## 2020-08-17 NOTE — SOCIAL WORK
Cm met with pt at bedside  Pt lives with his wife Hernandez Su and 2 daughters in a 2 story house, but bedroom/bathroom are on the first floor  A ramp is present to enter (wife has MS)  Pt has no problem navigating steps and is independent with ADL's  He uses no DME's  He went to OP/PT for a rotator cuff issue, but was never an in-patient  He has never used [facility] services  Denies substance abuse or mental health issues  He quit smoking in 1975  His PCP is Dr Olson Salts  He uses [employer] in [employer] and has no problem with co-pays  He has an Advanced Directive and his POA is his wife  He works and drives  His daughter Brian Ty will transport home when he is medically cleared  CM discussed d/c needs including HH, but pt does not feel this will be needed  CM will follow after cardiac cath is done  CM reviewed discharge planning process including the following: identifying help at home, patient preference for discharge planning needs, pharmacy preference, and availability of treatment team to discuss questions or concerns patient and/or family may have regarding understanding medications and recognizing signs and symptoms once discharged  CM also encouraged patient to follow up with all recommended appointments after discharge  Patient advised of importance for patient and family to participate in managing patients medical well being

## 2020-08-17 NOTE — ASSESSMENT & PLAN NOTE
· POA, /100 on admission  · Not previously maintained on any antihypertensives at home   · Significant improvement noted with Lopressor 25 mg BID, continue for now  · Cardiology following  · Continue to monitor

## 2020-08-17 NOTE — ASSESSMENT & PLAN NOTE
· Pt presented with chest pain symptoms  · EKG unable to viewed at this time, however upon review of ED provider's note, pt noted to have T wave inversions in the lateral leads, no prior to compare   · Troponin peak of 13 19  · Cardiology following as above  · Telemetry monitoring   · Continue ASA, statin, BB   · PRN nitro   · S/p cardiac cath, recommending transfer to B for CT surgery/CABG evaluation

## 2020-08-17 NOTE — DISCHARGE INSTRUCTIONS
After Radial Heart Catheterization   WHAT YOU NEED TO KNOW:   What will happen after a radial heart catheterization? · You will be attached to a heart monitor until you are fully awake  A heart monitor is an EKG that stays on continuously to record your heart's electrical activity  Healthcare providers will monitor your vital signs and pulses in your arm  They will frequently check your pressure bandage for bleeding or swelling  · You may have a band wrapped tightly around your wrist  The band puts pressure on your wound and helps prevent bleeding  A healthcare provider can put air into the band or remove air from the band  A healthcare provider will gradually remove air from the band and decrease pressure on your wrist  The band may be removed in 2 hours or when your wound stops bleeding  · You will need to keep your wrist straight for 2 to 4 hours  Do not  push or pull with your arm  Arm movements can cause serious bleeding  After you are monitored for several hours, you may go home or may need to stay in the hospital overnight  What do I need to know before I go home? · Care for your wound as directed  Remove the pressure bandage in 24 hours or as directed  Mild bruising is normal and expected  A small bandage can be placed on your wound after you remove the pressure bandage  Do not put powders, lotions, or creams on your wound  They may cause your wound to get infected  Monitor your wound every day for signs of infection, such as redness, swelling, or pus  · Shower the day after your procedure or as directed  Remove your pressure bandage before you shower  Do not take baths or go in hot tubs or pools  Carefully wash the wound with soap and water  Pat the area dry  A small bandage can be placed on your wound after you shower  · Apply firm, steady pressure to your wound if it bleeds  Apply pressure with a clean gauze or towel for 5 to 10 minutes   Call 911 if bleeding becomes heavy or does not stop  · Drink liquids as directed  Liquids will help flush the contrast liquid from your body  Ask how much liquid to drink each day and which liquids are best for you  · Do not lift anything heavier than 5 pounds until directed by your healthcare provider  Heavy lifting can put stress on your wound and cause bleeding  Do not push or pull with the arm that was used for the procedure  Do not do vigorous activity for at least 48 hours  Vigorous activity may cause bleeding from your wound  Rest and do quiet activities  Take short walks around the house to prevent a blood clot  Ask your healthcare provider when you can return to your normal activities  · Do not drive or return to work until your healthcare provider says it is okay  Your healthcare provider may tell you to wait 48 hours before you drive to decrease your risk for bleeding  You may not be able to return to work for at least 2 days after your procedure if your job involves heavy lifting  What medicines may I need? You may need any of the following:  · Blood thinners    help prevent blood clots  Examples of blood thinners include heparin and warfarin  Clots can cause strokes, heart attacks, and death  The following are general safety guidelines to follow while you are taking a blood thinner:    ¨ Watch for bleeding and bruising while you take blood thinners  Watch for bleeding from your gums or nose  Watch for blood in your urine and bowel movements  Use a soft washcloth on your skin, and a soft toothbrush to brush your teeth  This can keep your skin and gums from bleeding  If you shave, use an electric shaver  Do not play contact sports  ¨ Tell your dentist and other healthcare providers that you take anticoagulants  Wear a bracelet or necklace that says you take this medicine  ¨ Do not start or stop any medicines unless your healthcare provider tells you to  Many medicines cannot be used with blood thinners       ¨ Tell your healthcare provider right away if you forget to take the medicine, or if you take too much  ¨ Warfarin  is a blood thinner that you may need to take  The following are things you should be aware of if you take warfarin  § Foods and medicines can affect the amount of warfarin in your blood  Do not make major changes to your diet while you take warfarin  Warfarin works best when you eat about the same amount of vitamin K every day  Vitamin K is found in green leafy vegetables and certain other foods  Ask for more information about what to eat when you are taking warfarin  § You will need to see your healthcare provider for follow-up visits when you are on warfarin  You will need regular blood tests  These tests are used to decide how much medicine you need  · Acetaminophen  helps decrease pain and fever  This medicine is available without a doctor's order  Ask how much medicine is safe to take, and how often to take it  Acetaminophen can cause liver damage if not taken correctly  · Take your medicine as directed  Contact your healthcare provider if you think your medicine is not helping or if you have side effects  Tell him or her if you are allergic to any medicine  Keep a list of the medicines, vitamins, and herbs you take  Include the amounts, and when and why you take them  Bring the list or the pill bottles to follow-up visits  Carry your medicine list with you in case of an emergency    Call 911 for any of the following:   · You have any of the following signs of a heart attack:      ¨ Squeezing, pressure, or pain in your chest that lasts longer than 5 minutes or returns    ¨ Discomfort or pain in your back, neck, jaw, stomach, or arm     ¨ Trouble breathing    ¨ Nausea or vomiting    ¨ Lightheadedness or a sudden cold sweat, especially with chest pain or trouble breathing    · You have any of the following signs of a stroke:      ¨ Numbness or drooping on one side of your face ¨ Weakness in an arm or leg    ¨ Confusion or difficulty speaking    ¨ Dizziness, a severe headache, or vision loss    · You feel lightheaded, short of breath, and have chest pain  · You cough up blood  · You have trouble breathing  · You cannot stop the bleeding from your wound even after you hold firm pressure for 10 minutes  When should I seek immediate care? · Blood soaks through your bandage  · Your stitches come apart  · Your hand or arm feels numb, cool, or looks pale  · Your wound gets swollen quickly  When should I contact my healthcare provider? · You have a fever or chills  · Your wound is red, swollen, or draining pus  · Your wound looks more bruised or you have new bruising on the side of your wrist      · You have nausea or are vomiting  · Your skin is itchy, swollen, or you have a rash  · You have questions or concerns about your condition or care  CARE AGREEMENT:   You have the right to help plan your care  Learn about your health condition and how it may be treated  Discuss treatment options with your caregivers to decide what care you want to receive  You always have the right to refuse treatment  The above information is an  only  It is not intended as medical advice for individual conditions or treatments  Talk to your doctor, nurse or pharmacist before following any medical regimen to see if it is safe and effective for you  © 2017 [employer] Information is for End User's use only and may not be sold, redistributed or otherwise used for commercial purposes  All illustrations and images included in CareNotes® are the copyrighted property of [employer]  or [employer]

## 2020-08-17 NOTE — ASSESSMENT & PLAN NOTE
· Pt presented with severe substernal chest pain, now resolved   · CTA with no evidence of acute PE or other intrathoracic abnormality   Extensive aortic valve calcification   · Troponin 0 22/5 96/9 67/11 49, peak of 13 16   · Currently on IV heparin gtt   · Continue ASA, statin and Lopressor 25 mg BID  · Obtain lipid panel   · Cardiology following,  · Plan for cardiac cath today, pre-treated with IV solu-medrol and IV benadryl, will d/c after cath performed, follow up results of cath for further recommendations  · Cardiac diet   · ECHO with EF 60%, moderate to severe concentric hypertrophy, severe aortic stenosis

## 2020-08-17 NOTE — SOCIAL WORK
Pt is a tentative transfer to - for CABG not performed at [facility]  Pt is considering this option  Med Nec completed and placed in pt's chart

## 2020-08-17 NOTE — PROGRESS NOTES
General Cardiology   Progress Note   James Llanes 72 y o  male MRN: [id number]  Unit/Bed#: -01 Encounter: [id number]    Assessment/Plan:    1  NSTEMI, type to be determined  -troponin elevated at 0 22/5 96/9 67/11 49, continue to trend peak  -patient presented with chest pain with radiation to both arms  -continue heparin drip  -continue aspirin, statin, beta-blocker  -the patient to undergo coronary angiography today to evaluate etiology of elevated troponin  -given to contrast dye allergy patient prepped with IV methylprednisone 40 mg  q 8 hours and received Benadryl 50 mg prior to angiography  -TTE performed yesterday revealed normal systolic function with an EF of 60%, no regional wall motion abnormalities and moderate to severe concentric hypertrophy, mitral valve with mild-to-moderate regurgitation and aortic valve with severe stenosis  -continue to monitor on telemetry  -cardiac diet     2  Severe aortic valve stenosis  -coronary angiography being performed today to evaluate coronary anatomy and aortic valve    3  Hypertension, currently controlled  -continue metoprolol tartrate  -continue to monitor blood pressures     4  Contrast dye reaction   -occurred yesterday with contrast for CT of the chest  -currently receiving IV methylprednisone around the clock  -patient to be prepped as above for coronary angiography tomorrow         Subjective:   Patient seen and examined  No significant events since the last encounter       REVIEW OF SYSTEMS:  Constitutional:  Denies fever or chills   Eyes:  Denies change in visual acuity   HENT:  Denies nasal congestion or sore throat   Respiratory:  Denies cough or shortness of breath   Cardiovascular:  Denies chest pain or edema   GI:  Denies abdominal pain, nausea, vomiting, bloody stools, constipation or diarrhea   :  Denies dysuria, frequency, difficulty in urination or nocturia  Musculoskeletal:  Denies back pain or joint pain   Neurologic:  Denies headache, focal weakness or sensory changes   Endocrine:  Denies polyuria or polydipsia   Lymphatic:  Denies swollen glands   Psychiatric:  Denies depression or anxiety     Objective:   Vitals:  Vitals:    20 0821   BP: 150/73   Pulse: 65   Resp:    Temp: 97 5 °F (36 4 °C)   SpO2: 94%       Body mass index is 33 66 kg/m²  Systolic (50ZXK), XWL:981 , Min:132 , XO     Diastolic (02PMZ), PGL:85, Min:64, Max:77      Intake/Output Summary (Last 24 hours) at 2020 1007  Last data filed at 2020 0502  Gross per 24 hour   Intake 240 ml   Output    Net 240 ml     Weight (last 2 days)     Date/Time   Weight    08/15/20 1420   86 2 (190)              Telemetry Review: No significant arrhythmias seen on telemetry review  Sinus rhythm with a rate in the 70s        PHYSICAL EXAMS  General:  Patient is not in acute distress, laying in the bed comfortably, awake, alert responding to commands  Head: Normocephalic, Atraumatic     HEENT: White sclera, pink conjunctiva  Neck:  Supple, no neck vein distention  Respiratory: clear to P/A  Cardiovascular: S1-S2 normal, 3/6 systolic murmur, thrills, gallops, rubs, regular rhythm  GI:  Abdomen soft, nontender, non-distended  Extremities: No edema, normal pulses  Integument:  No skin rashes or ulceration  Neurologic:  Patient is awake alert, responding to command, oriented to person, place and time    Nd time   LABORATORY RESULTS:  Results from last 7 days   Lab Units 20  1830 20  1543 20  1035   TROPONIN I ng/mL 13 16* 9 78* 12 79*     CBC with diff:   Results from last 7 days   Lab Units 20  0453 08/15/20  1453   WBC Thousand/uL 19 16* 10 90*   HEMOGLOBIN g/dL 14 4 15 4   HEMATOCRIT % 43 9 48 0   MCV fL 85 85   PLATELETS Thousands/uL 230 234   MCH pg 28 0 27 4   MCHC g/dL 32 8 32 1   RDW % 13 4 13 2   MPV fL 12 0 12 0   NRBC AUTO /100 WBCs  --  0       CMP:  Results from last 7 days   Lab Units 20  0453 08/15/20  1453   POTASSIUM mmol/L 4 2 4 6 CHLORIDE mmol/L 106 103   CO2 mmol/L 26 29   BUN mg/dL 22 10   CREATININE mg/dL 1 12 1 07   CALCIUM mg/dL 9 0 10 1   AST U/L  --  20   ALT U/L  --  29   ALK PHOS U/L  --  79   EGFR ml/min/1 73sq m 69 72       BMP:  Results from last 7 days   Lab Units 20  0453 08/15/20  1453   POTASSIUM mmol/L 4 2 4 6   CHLORIDE mmol/L 106 103   CO2 mmol/L 26 29   BUN mg/dL 22 10   CREATININE mg/dL 1 12 1 07   CALCIUM mg/dL 9 0 10 1         Results from last 7 days   Lab Units 08/15/20  1453   NT-PRO BNP pg/mL 804*                  Results from last 7 days   Lab Units 20  0011   INR  1 11       Lipid Profile:   No results found for: CHOL  No results found for: HDL  No results found for: LDLCALC  No results found for: TRIG    Cardiac testing:   Results for orders placed during the hospital encounter of 08/15/20   Echo complete with contrast if indicated    Narrative [address]  [address] [facility] [address] [place], [place] [address]  [phone]    Transthoracic Echocardiogram  2D, M-mode, and Color Doppler    Study date:  16-Aug-2020    Patient: Shyla Betancur  MR number: [id number]  Account number: [de-identified]  : 1955  Age: 72 years  Gender: Male  Status: Inpatient  Location: Bedside  Height: 63 in  Weight: 190 lb  BP: 133/ 63 mmHg    Indications: Murmur  Diagnoses: R01 1 - Cardiac murmur, unspecified    Sonographer:  Carlson RCS  Referring Physician:  Tk Quick  Group:  [facility]  Interpreting Physician:  Srinivas Rosario MD    SUMMARY    LEFT VENTRICLE:  Systolic function was normal  Ejection fraction was estimated to be 60 %  There were no regional wall motion abnormalities  There was moderate-severe concentric hypertrophy  RIGHT VENTRICLE:  The size was normal   Systolic function was normal     MITRAL VALVE:  There was mild annular calcification  There was mild stenosis  There was mild to moderate regurgitation      AORTIC VALVE:  The valve was not visualized well enough to rule out a bicuspid morphology  Leaflets exhibited increased thickness and calcification with reduced cuspal separation  Transaortic velocity and gradient were increased due to stenosis as well as concomitant increased transaortic flow  There was severe stenosis  Peak and mean AV gradients were 102 and 57 mm Hg respectively  KAUR by the continuity equation method was 0 6 sq cm  There was moderate regurgitation  TRICUSPID VALVE:  There was trace regurgitation  Estimated peak PA pressure was 44 mmHg  PULMONIC VALVE:  There was trace regurgitation  HISTORY: PRIOR HISTORY: NSTEMI  Hypertension  PROCEDURE: The procedure was performed at the bedside  This was a routine study  The transthoracic approach was used  The study included complete 2D imaging, M-mode, and color Doppler  The heart rate was 84 bpm, at the start of the study  Images were obtained from the parasternal, apical, subcostal, and suprasternal notch acoustic windows  Image quality was adequate  LEFT VENTRICLE: Size was normal  Systolic function was normal  Ejection fraction was estimated to be 60 %  There were no regional wall motion abnormalities  There was moderate-severe concentric hypertrophy  No evidence of apical thrombus  DOPPLER: Left ventricular diastolic function parameters were normal     RIGHT VENTRICLE: The size was normal  Systolic function was normal  Wall thickness was normal     LEFT ATRIUM: Size was normal     RIGHT ATRIUM: Size was normal     MITRAL VALVE: There was mild annular calcification  There was normal leaflet separation  DOPPLER: There was mild stenosis  There was mild to moderate regurgitation  AORTIC VALVE: The valve was not visualized well enough to rule out a bicuspid morphology  Leaflets exhibited increased thickness and calcification with reduced cuspal separation   DOPPLER: Transaortic velocity and gradient were increased  due to stenosis as well as concomitant increased transaortic flow  There was severe stenosis  Peak and mean AV gradients were 102 and 57 mm Hg respectively  KAUR by the continuity equation method was 0 6 sq cm  There was moderate  regurgitation  TRICUSPID VALVE: The valve structure was normal  There was normal leaflet separation  DOPPLER: The transtricuspid velocity was within the normal range  There was no evidence for stenosis  There was trace regurgitation  Estimated peak PA  pressure was 44 mmHg  PULMONIC VALVE: Leaflets exhibited normal thickness, no calcification, and normal cuspal separation  DOPPLER: The transpulmonic velocity was within the normal range  There was trace regurgitation  PERICARDIUM: There was no pericardial effusion  The pericardium was normal in appearance  AORTA: The root exhibited normal size  SYSTEMIC VEINS: IVC: The inferior vena cava was normal in size  The respirophasic change in diameter was less than 50%  SYSTEM MEASUREMENT TABLES    2D  %FS: 25 1 %  Ao Diam: 3 7 cm  EDV(Teich): 88 3 ml  EF(Teich): 49 8 %  ESV(Teich): 44 3 ml  IVSd: 1 8 cm  LA Area: 16 9 cm2  LA Diam: 4 3 cm  LVEDV MOD A4C: 123 9 ml  LVEF MOD A4C: 50 7 %  LVESV MOD A4C: 61 ml  LVIDd: 4 4 cm  LVIDs: 3 3 cm  LVLd A4C: 8 9 cm  LVLs A4C: 8 1 cm  LVOT Diam: 1 9 cm  LVPWd: 1 4 cm  RA Area: 14 4 cm2  RVIDd: 3 3 cm  SV MOD A4C: 62 9 ml  SV(Teich): 44 ml    CW  AR Dec [place]: 2 3 m/s2  AR Dec Time: 1441 5 ms  AR PHT: 418 ms  AR Vmax: 3 2 m/s  AR maxP 5 mmHg  AV Env  Ti: 363 5 ms  AV VTI: 127 5 cm  AV Vmax: 4 8 m/s  AV Vmean: 3 5 m/s  AV maxP 7 mmHg  AV meanP 2 mmHg  TR Vmax: 3 m/s  TR maxP 2 mmHg    MM  TAPSE: 2 1 cm    PW  KAUR (VTI): 0 6 cm2  KAUR Vmax: 0 5 cm2  E': 0 1 m/s  E/E': 22 8  LVOT Env  Ti: 365 4 ms  LVOT VTI: 28 2 cm  LVOT Vmax: 0 9 m/s  LVOT Vmean: 0 8 m/s  LVOT maxPG: 3 6 mmHg  LVOT meanP 5 mmHg  LVSV Dopp: 77 2 ml  MV A Evens: 0 8 m/s  MV Dec [place]: 3 6 m/s2  MV DecT: 354 5 ms  MV E Evens: 1 3 m/s  MV E/A Ratio: 1 6  MV PHT: 102 8 ms  MVA By PHT: 2 1 cm2    [employer] Accredited Echocardiography Laboratory    Prepared and electronically signed by    Srinivas Rosario MD  Signed 16-Aug-2020 15:02:20       No results found for this or any previous visit  No results found for this or any previous visit  No procedure found  No results found for this or any previous visit  Meds/Allergies   all current active meds have been reviewed  No medications prior to admission  heparin (porcine), 3-20 Units/kg/hr (Order-Specific), Last Rate: 13 8 Units/kg/hr (08/17/20 0816)        Counseling / Coordination of Care  Total floor / unit time spent today 15 minutes  Greater than 50% of total time was spent with the patient and / or family counseling and / or coordination of care  ** Please Note: Dragon 360 Dictation voice to text software may have been used in the creation of this document   **

## 2020-08-17 NOTE — ASSESSMENT & PLAN NOTE
·  on admission   · ECHO with preserved EF, moderate to severe concentric hypertrophy   · CXR unremarkable  · Appears euvolemic at this time, continue to monitor

## 2020-08-17 NOTE — ASSESSMENT & PLAN NOTE
· Pt with allergic reaction to IV dye in the ED while obtaining CTA  · Noted to have periorbital swelling and shortness of breath/wheezing which has now completely resolved    · Given pepcid, IV solu-medrol and PRN IV Benadryl prior to cardiac catheterization, can likely d/c after cath completed

## 2020-08-17 NOTE — ASSESSMENT & PLAN NOTE
· Pt presented with chest pain symptoms  · EKG unable to viewed at this time, however upon review of ED provider's note, pt noted to have T wave inversions in the lateral leads, no prior to compare   · Troponin peak of 13 19  · Continue IV heparin gtt   · Cardiology following as above  · Telemetry monitoring   · Continue ASA, statin, BB   · PRN nitro   · Plan for cardiac catheterization today, pre-treatment for contrast allergy as above

## 2020-08-17 NOTE — PROGRESS NOTES
Progress Note - Cherylene Ley 1955, 72 y o  male MRN: [id number]    Unit/Bed#: -01 Encounter: [id number]    Primary Care Provider: Howard Tobar MD   Date and time admitted to hospital: 8/15/2020  2:20 PM      DOS: 8/17/2020    * Chest pain  Assessment & Plan  · Pt presented with severe substernal chest pain, now resolved   · CTA with no evidence of acute PE or other intrathoracic abnormality   Extensive aortic valve calcification   · Troponin 0 22/5 96/9 67/11 49, peak of 13 16   · Currently on IV heparin gtt   · Continue ASA, statin and Lopressor 25 mg BID  · Obtain lipid panel   · Cardiology following,  · Plan for cardiac cath today, pre-treated with IV solu-medrol and IV benadryl, will d/c after cath performed, follow up results of cath for further recommendations  · Cardiac diet   · ECHO with EF 60%, moderate to severe concentric hypertrophy, severe aortic stenosis  · Give gentle IVF hydration as patient receiving additional contrast with catheterization today    NSTEMI (non-ST elevated myocardial infarction) ([facility] [address] )  Assessment & Plan  · Pt presented with chest pain symptoms  · EKG unable to viewed at this time, however upon review of ED provider's note, pt noted to have T wave inversions in the lateral leads, no prior to compare   · Troponin peak of 13 19  · Continue IV heparin gtt   · Cardiology following as above  · Telemetry monitoring   · Continue ASA, statin, BB   · PRN nitro   · Plan for cardiac catheterization today, pre-treatment for contrast allergy as above    Leukocytosis  Assessment & Plan  · WBC worsened to 19 16 today   · Likely in setting of IV solu-medrol treatment   · Obtain repeat CBC in the AM to trend   · No evidence of acute infectious etiology at this time     Allergic reaction to contrast dye  Assessment & Plan  · Pt with allergic reaction to IV dye in the ED while obtaining CTA  · Noted to have periorbital swelling and shortness of breath/wheezing which has now completely resolved    · Given pepcid, IV solu-medrol and PRN IV Benadryl prior to cardiac catheterization, can likely d/c after cath completed    Murmur, cardiac  Assessment & Plan  · Systolic murmur noted on exam  · ECHO with evidence of severe aortic stenosis  · Cardiology following, appreciate recommendations  · Additional plan as above    Elevated brain natriuretic peptide (BNP) level  Assessment & Plan  ·  on admission   · ECHO with preserved EF, moderate to severe concentric hypertrophy   · CXR unremarkable  · Appears euvolemic at this time, continue to monitor    Accelerated hypertension  Assessment & Plan  · POA, /100 on admission  · Not previously maintained on any antihypertensives at home   · Significant improvement noted with Lopressor 25 mg BID, continue for now  · Cardiology following  · Continue to monitor      VTE Pharmacologic Prophylaxis:   Pharmacologic: Heparin Drip  Mechanical VTE Prophylaxis in Place: Yes    Patient Centered Rounds: I have evaluated patient without nursing staff present due to Speaking to nurse outside patient's room, Celena    Discussions with Specialists or Other Care Team Provider:  Discussed with Cardiology, RN, cm and reviewed previous notes    Education and Discussions with Family / Patient:  Discussed with patient and patient's wife and daughter at bedside regarding plan of care    Time Spent for Care: 30 minutes  More than 50% of total time spent on counseling and coordination of care as described above  Current Length of Stay: 1 day(s)    Current Patient Status: Inpatient   Certification Statement: The patient will continue to require additional inpatient hospital stay due to Cardiac catheterization, additional cardiac workup    Discharge Plan:  Not medically stable as above, pending cardiac catheterization results    Code Status: Level 1 - Full Code      Subjective:   Patient reports that he feels fine today    He denies any chest pain, shortness of breath, lightheadedness, nausea, vomiting or abdominal pain  Objective:     Vitals:   Temp (24hrs), Av 1 °F (36 7 °C), Min:97 5 °F (36 4 °C), Max:98 6 °F (37 °C)    Temp:  [97 5 °F (36 4 °C)-98 6 °F (37 °C)] 97 5 °F (36 4 °C)  HR:  [56-67] 65  Resp:  [16] 16  BP: (132-150)/(64-77) 150/73  SpO2:  [92 %-96 %] 94 %  Body mass index is 33 66 kg/m²  Input and Output Summary (last 24 hours): Intake/Output Summary (Last 24 hours) at 2020 1003  Last data filed at 2020 0502  Gross per 24 hour   Intake 240 ml   Output    Net 240 ml       Physical Exam:     Physical Exam  Vitals signs reviewed  Constitutional:       General: He is not in acute distress  Appearance: Normal appearance  He is not toxic-appearing  Comments: Patient is in no acute distress lying in his hospital bed resting comfortably accompanied by family   HENT:      Head: Normocephalic and atraumatic  Eyes:      General: No scleral icterus  Conjunctiva/sclera: Conjunctivae normal       Pupils: Pupils are equal, round, and reactive to light  Cardiovascular:      Rate and Rhythm: Normal rate and regular rhythm  Pulses: Normal pulses  Heart sounds: Murmur (Loud, systolic) present  Pulmonary:      Effort: Pulmonary effort is normal  No respiratory distress  Breath sounds: Normal breath sounds  No wheezing or rales  Abdominal:      General: Abdomen is flat  Bowel sounds are normal  There is no distension  Palpations: Abdomen is soft  Tenderness: There is no abdominal tenderness  There is no guarding  Musculoskeletal:         General: No swelling  Skin:     General: Skin is warm and dry  Findings: No erythema  Neurological:      Mental Status: He is alert  Mental status is at baseline           Additional Data:     Labs:    Results from last 7 days   Lab Units 20  0453 08/15/20  1453   WBC Thousand/uL 19 16* 10 90*   HEMOGLOBIN g/dL 14 4 15 4   HEMATOCRIT % 43 9 48 0   PLATELETS Thousands/uL 230 234   NEUTROS PCT %  --  84*   LYMPHS PCT %  --  9*   MONOS PCT %  --  4   EOS PCT %  --  1     Results from last 7 days   Lab Units 08/17/20  0453 08/15/20  1453   POTASSIUM mmol/L 4 2 4 6   CHLORIDE mmol/L 106 103   CO2 mmol/L 26 29   BUN mg/dL 22 10   CREATININE mg/dL 1 12 1 07   CALCIUM mg/dL 9 0 10 1   ALK PHOS U/L  --  79   ALT U/L  --  29   AST U/L  --  20     Results from last 7 days   Lab Units 08/16/20  0011   INR  1 11       * I Have Reviewed All Lab Data Listed Above  * Additional Pertinent Lab Tests Reviewed:  All Labs Within Last 24 Hours Reviewed    Imaging:    Imaging Reports Reviewed Today Include:  Echo  Imaging Personally Reviewed by Myself Includes:  None    Recent Cultures (last 7 days):           Last 24 Hours Medication List:   Current Facility-Administered Medications   Medication Dose Route Frequency Provider Last Rate    acetaminophen  650 mg Oral Q4H PRN Benji Ochoa MD      aspirin  81 mg Oral Daily Benji Ochoa MD      atorvastatin  40 mg Oral QPM Benji Ochoa MD      calcium carbonate  1,000 mg Oral Daily PRN Benji Ochoa MD      diphenhydrAMINE (BENADRYL) IVPB  25 mg Intravenous Q6H PRN Benji Ochoa MD      famotidine  20 mg Oral BID Benji Ochoa MD      heparin (porcine)  3-20 Units/kg/hr (Order-Specific) Intravenous Titrated Lindsey DAVIE Riosoloines, DO 13 8 Units/kg/hr (08/17/20 0816)    heparin (porcine)  2,000 Units Intravenous Q1H PRN Lindsey DAVIE Terry, DO      heparin (porcine)  4,000 Units Intravenous Q1H PRN Lindsey L Mikolosknicolás, DO      loratadine  10 mg Oral Daily Benji Ochoa MD      methylPREDNISolone sodium succinate  40 mg Intravenous [facility] Benji Ochoa MD      metoprolol tartrate  25 mg Oral Q12H [address] Kevan Morrell MD      nitroglycerin  0 4 mg Sublingual Q5 Min PRN Benji Ochoa MD      ondansetron  4 mg Intravenous Q6H PRN Benji Ochoa MD      pantoprazole  40 mg Intravenous Q24H [address] Benji Ochoa MD      simpriscillaiconmarissa 80 mg Oral 4x Daily PRN Jenniffer Garcia MD      sodium chloride (PF)  3 mL Intravenous Q1H PRN Jenniffer Garcia MD      sodium chloride (PF)  3 mL Intravenous Q1H PRN Jenniffer Garcia MD          Today, Patient Was Seen By: Breann Qiu PA-C    ** Please Note: Dictation voice to text software may have been used in the creation of this document   **

## 2020-08-17 NOTE — ASSESSMENT & PLAN NOTE
· Systolic murmur noted on exam  · ECHO with evidence of severe aortic stenosis  · Cardiology following, appreciate recommendations  · Additional plan as above

## 2020-08-17 NOTE — ASSESSMENT & PLAN NOTE
· WBC worsened to 19 16 today   · Likely in setting of IV solu-medrol treatment   · Obtain repeat CBC in the AM to trend   · No evidence of acute infectious etiology at this time

## 2020-08-17 NOTE — ASSESSMENT & PLAN NOTE
· Pt presented with severe substernal chest pain, now resolved   · CTA with no evidence of acute PE or other intrathoracic abnormality   Extensive aortic valve calcification   · Troponin 0 22/5 96/9 67/11 49, peak of 13 16   · Continue ASA, statin and Lopressor 25 mg BID  · Lipid panel pending   · Cardiology following,  · Cardiac catheterization performed today - evidence of mid % occlusion, proximal LAD 50% stenosis and focal mid LAD 50% stenosis   · ECHO with EF 60%, moderate to severe concentric hypertrophy, severe aortic stenosis  · Recommending transfer to B for CT surgery evaluation

## 2020-08-17 NOTE — TRANSPORTATION MEDICAL NECESSITY
Section I - General Information    Name of Patient: Yara York                 : 1955    Medicare #: [id number]  Transport Date: 20 (PCS is valid for round trips on this date and for all repetitive trips in the 60-day range as noted below )  Origin: Kamaljit Robertson 82: [facility]  Is the pt's stay covered under Medicare Part A (PPS/DRG)   []   No auth required for [facility] facility? If no, why is transport to more distant facility required? Yes  If hospice pt, is this transport related to pt's terminal illness? No       Section II - Medical Necessity Questionnaire  Ambulance transportation is medically necessary only if other means of transport are contraindicated or would be potentially harmful to the patient  To meet this requirement, the patient must either be "bed confined" or suffer from a condition such that transport by means other than ambulance is contraindicated by the patient's condition  The following questions must be answered by the medical professional signing below for this form to be valid:    1)  Describe the MEDICAL CONDITION (physical and/or mental) of this patient AT [address] [place] [address] that requires the patient to be transported in an ambulance and why transport by other means is contraindicated by the patient's condition:chest pain, abnormal cath, NSTEMI  Needs eval for CABG    2) Is the patient "bed confined" as defined below? No  To be "be confined" the patient must satisfy all three of the following conditions: (1) unable to get up from bed without Assistance; AND (2) unable to ambulate; AND (3) unable to sit in a chair or wheelchair  3) Can this patient safely be transported by car or wheelchair van (i e , seated during transport without a medical attendant or monitoring)?    No    4) In addition to completing questions 1-3 above, please check any of the following conditions that apply*:   *Note: supporting documentation for any boxes checked must be maintained in the patient's medical records  If hosp-hosp transfer, describe services needed at 2nd facility not available at 1st facility? Medical attendant required   Unable to tolerate seated position for time needed to transport   Cardiac monitoring required en route       Section III - Signature of Physician or Healthcare Professional  I certify that the above information is true and correct based on my evaluation of this patient, and represent that the patient requires transport by ambulance and that other forms of transport are contraindicated  I understand that this information will be used by the Centers for Medicare and Medicaid Services (CMS) to support the determination of medical necessity for ambulance services, and I represent that I have personal knowledge of the patient's condition at time of transport  []  If this box is checked, I also certify that the patient is physically or mentally incapable of signing the ambulance service's claim and that the institution with which I am affiliated has furnished care, services, or assistance to the patient  My signature below is made on behalf of the patient pursuant to 42 CFR §424 36(b)(4)   In accordance with 42 CFR §424 37, the specific reason(s) that the patient is physically or mentally incapable of signing the claim form is as follows: n/a      Signature of Physician* or Healthcare Professional______________________________________________________________  Signature Date 08/17/20 (For scheduled repetitive transports, this form is not valid for transports performed more than 60 days after this date)    Printed Name & Credentials of Physician or Healthcare Professional (MD, DO, RN, etc )__Viki Reinoso RN______________________________  *Form must be signed by patient's attending physician for scheduled, repetitive transports   For non-repetitive, unscheduled ambulance transports, if unable to obtain the signature of the attending physician, any of the following may sign (choose appropriate option below)  [] Physician Assistant []  Clinical Nurse Specialist [x]  Registered Nurse  []  Nurse Practitioner  [x] Discharge Planner

## 2020-08-18 ENCOUNTER — APPOINTMENT (INPATIENT)
Dept: NON INVASIVE DIAGNOSTICS | Facility: HOSPITAL | Age: 65
DRG: 219 | End: 2020-08-18
Payer: COMMERCIAL

## 2020-08-18 ENCOUNTER — APPOINTMENT (INPATIENT)
Dept: RADIOLOGY | Facility: HOSPITAL | Age: 65
DRG: 219 | End: 2020-08-18
Payer: COMMERCIAL

## 2020-08-18 PROBLEM — I25.10 CAD (CORONARY ARTERY DISEASE): Status: ACTIVE | Noted: 2020-08-18

## 2020-08-18 LAB
ABO GROUP BLD: NORMAL
ABO GROUP BLD: NORMAL
ANION GAP SERPL CALCULATED.3IONS-SCNC: 5 MMOL/L (ref 4–13)
APTT PPP: 53 SECONDS (ref 23–37)
APTT PPP: 70 SECONDS (ref 23–37)
APTT PPP: 91 SECONDS (ref 23–37)
BASOPHILS # BLD AUTO: 0.03 THOUSANDS/ΜL (ref 0–0.1)
BASOPHILS NFR BLD AUTO: 0 % (ref 0–1)
BILIRUB UR QL STRIP: NEGATIVE
BLD GP AB SCN SERPL QL: NEGATIVE
BUN SERPL-MCNC: 20 MG/DL (ref 5–25)
CALCIUM SERPL-MCNC: 8.7 MG/DL (ref 8.3–10.1)
CHLORIDE SERPL-SCNC: 109 MMOL/L (ref 100–108)
CLARITY UR: CLEAR
CO2 SERPL-SCNC: 27 MMOL/L (ref 21–32)
COLOR UR: YELLOW
CREAT SERPL-MCNC: 0.89 MG/DL (ref 0.6–1.3)
EOSINOPHIL # BLD AUTO: 0.01 THOUSAND/ΜL (ref 0–0.61)
EOSINOPHIL NFR BLD AUTO: 0 % (ref 0–6)
ERYTHROCYTE [DISTWIDTH] IN BLOOD BY AUTOMATED COUNT: 13.6 % (ref 11.6–15.1)
GFR SERPL CREATININE-BSD FRML MDRD: 90 ML/MIN/1.73SQ M
GLUCOSE SERPL-MCNC: 86 MG/DL (ref 65–140)
GLUCOSE UR STRIP-MCNC: NEGATIVE MG/DL
HCT VFR BLD AUTO: 42 % (ref 36.5–49.3)
HGB BLD-MCNC: 13.8 G/DL (ref 12–17)
HGB UR QL STRIP.AUTO: NEGATIVE
IMM GRANULOCYTES # BLD AUTO: 0.1 THOUSAND/UL (ref 0–0.2)
IMM GRANULOCYTES NFR BLD AUTO: 1 % (ref 0–2)
KETONES UR STRIP-MCNC: NEGATIVE MG/DL
LEUKOCYTE ESTERASE UR QL STRIP: NEGATIVE
LYMPHOCYTES # BLD AUTO: 1.68 THOUSANDS/ΜL (ref 0.6–4.47)
LYMPHOCYTES NFR BLD AUTO: 12 % (ref 14–44)
MCH RBC QN AUTO: 28.3 PG (ref 26.8–34.3)
MCHC RBC AUTO-ENTMCNC: 32.9 G/DL (ref 31.4–37.4)
MCV RBC AUTO: 86 FL (ref 82–98)
MONOCYTES # BLD AUTO: 1.03 THOUSAND/ΜL (ref 0.17–1.22)
MONOCYTES NFR BLD AUTO: 8 % (ref 4–12)
NEUTROPHILS # BLD AUTO: 10.96 THOUSANDS/ΜL (ref 1.85–7.62)
NEUTS SEG NFR BLD AUTO: 79 % (ref 43–75)
NITRITE UR QL STRIP: NEGATIVE
NRBC BLD AUTO-RTO: 0 /100 WBCS
PH UR STRIP.AUTO: 6 [PH]
PLATELET # BLD AUTO: 188 THOUSANDS/UL (ref 149–390)
PMV BLD AUTO: 12.5 FL (ref 8.9–12.7)
POTASSIUM SERPL-SCNC: 3.8 MMOL/L (ref 3.5–5.3)
PROT UR STRIP-MCNC: NEGATIVE MG/DL
RBC # BLD AUTO: 4.88 MILLION/UL (ref 3.88–5.62)
RH BLD: POSITIVE
RH BLD: POSITIVE
SARS-COV-2 RNA RESP QL NAA+PROBE: NEGATIVE
SODIUM SERPL-SCNC: 141 MMOL/L (ref 136–145)
SP GR UR STRIP.AUTO: 1.02 (ref 1–1.03)
SPECIMEN EXPIRATION DATE: NORMAL
UROBILINOGEN UR QL STRIP.AUTO: 1 E.U./DL
WBC # BLD AUTO: 13.81 THOUSAND/UL (ref 4.31–10.16)

## 2020-08-18 PROCEDURE — 81003 URINALYSIS AUTO W/O SCOPE: CPT | Performed by: PHYSICIAN ASSISTANT

## 2020-08-18 PROCEDURE — 93971 EXTREMITY STUDY: CPT | Performed by: SURGERY

## 2020-08-18 PROCEDURE — 99255 IP/OBS CONSLTJ NEW/EST HI 80: CPT | Performed by: THORACIC SURGERY (CARDIOTHORACIC VASCULAR SURGERY)

## 2020-08-18 PROCEDURE — 93880 EXTRACRANIAL BILAT STUDY: CPT | Performed by: SURGERY

## 2020-08-18 PROCEDURE — 99232 SBSQ HOSP IP/OBS MODERATE 35: CPT | Performed by: INTERNAL MEDICINE

## 2020-08-18 PROCEDURE — 85730 THROMBOPLASTIN TIME PARTIAL: CPT | Performed by: FAMILY MEDICINE

## 2020-08-18 PROCEDURE — 85025 COMPLETE CBC W/AUTO DIFF WBC: CPT | Performed by: FAMILY MEDICINE

## 2020-08-18 PROCEDURE — U0003 INFECTIOUS AGENT DETECTION BY NUCLEIC ACID (DNA OR RNA); SEVERE ACUTE RESPIRATORY SYNDROME CORONAVIRUS 2 (SARS-COV-2) (CORONAVIRUS DISEASE [COVID-19]), AMPLIFIED PROBE TECHNIQUE, MAKING USE OF HIGH THROUGHPUT TECHNOLOGIES AS DESCRIBED BY CMS-2020-01-R: HCPCS | Performed by: PHYSICIAN ASSISTANT

## 2020-08-18 PROCEDURE — 80048 BASIC METABOLIC PNL TOTAL CA: CPT | Performed by: FAMILY MEDICINE

## 2020-08-18 PROCEDURE — 93971 EXTREMITY STUDY: CPT

## 2020-08-18 PROCEDURE — 86901 BLOOD TYPING SEROLOGIC RH(D): CPT | Performed by: PHYSICIAN ASSISTANT

## 2020-08-18 PROCEDURE — 85730 THROMBOPLASTIN TIME PARTIAL: CPT | Performed by: INTERNAL MEDICINE

## 2020-08-18 PROCEDURE — 86850 RBC ANTIBODY SCREEN: CPT | Performed by: PHYSICIAN ASSISTANT

## 2020-08-18 PROCEDURE — 93880 EXTRACRANIAL BILAT STUDY: CPT

## 2020-08-18 PROCEDURE — 70355 PANORAMIC X-RAY OF JAWS: CPT

## 2020-08-18 PROCEDURE — 86920 COMPATIBILITY TEST SPIN: CPT

## 2020-08-18 PROCEDURE — 87081 CULTURE SCREEN ONLY: CPT | Performed by: PHYSICIAN ASSISTANT

## 2020-08-18 PROCEDURE — 99233 SBSQ HOSP IP/OBS HIGH 50: CPT | Performed by: INTERNAL MEDICINE

## 2020-08-18 PROCEDURE — 86900 BLOOD TYPING SEROLOGIC ABO: CPT | Performed by: PHYSICIAN ASSISTANT

## 2020-08-18 PROCEDURE — C9113 INJ PANTOPRAZOLE SODIUM, VIA: HCPCS | Performed by: FAMILY MEDICINE

## 2020-08-18 RX ORDER — HEPARIN SODIUM 1000 [USP'U]/ML
400 INJECTION, SOLUTION INTRAVENOUS; SUBCUTANEOUS ONCE
Status: CANCELLED | OUTPATIENT
Start: 2020-08-19

## 2020-08-18 RX ORDER — CHLORHEXIDINE GLUCONATE 0.12 MG/ML
15 RINSE ORAL ONCE
Status: COMPLETED | OUTPATIENT
Start: 2020-08-19 | End: 2020-08-19

## 2020-08-18 RX ORDER — CEFAZOLIN SODIUM 2 G/50ML
2000 SOLUTION INTRAVENOUS ONCE
Status: CANCELLED | OUTPATIENT
Start: 2020-08-19

## 2020-08-18 RX ORDER — PANTOPRAZOLE SODIUM 40 MG/1
40 TABLET, DELAYED RELEASE ORAL
Status: DISCONTINUED | OUTPATIENT
Start: 2020-08-19 | End: 2020-08-19 | Stop reason: HOSPADM

## 2020-08-18 RX ORDER — HEPARIN SODIUM 1000 [USP'U]/ML
10000 INJECTION, SOLUTION INTRAVENOUS; SUBCUTANEOUS ONCE
Status: CANCELLED | OUTPATIENT
Start: 2020-08-19

## 2020-08-18 RX ORDER — CHLORHEXIDINE GLUCONATE 0.12 MG/ML
15 RINSE ORAL EVERY 12 HOURS SCHEDULED
Status: DISCONTINUED | OUTPATIENT
Start: 2020-08-18 | End: 2020-08-19 | Stop reason: HOSPADM

## 2020-08-18 RX ADMIN — METOPROLOL TARTRATE 25 MG: 25 TABLET, FILM COATED ORAL at 21:58

## 2020-08-18 RX ADMIN — ATORVASTATIN CALCIUM 40 MG: 40 TABLET, FILM COATED ORAL at 17:15

## 2020-08-18 RX ADMIN — CHLORHEXIDINE GLUCONATE 0.12% ORAL RINSE 15 ML: 1.2 LIQUID ORAL at 11:08

## 2020-08-18 RX ADMIN — FAMOTIDINE 20 MG: 20 TABLET ORAL at 08:58

## 2020-08-18 RX ADMIN — MUPIROCIN 1 APPLICATION: 20 OINTMENT TOPICAL at 21:58

## 2020-08-18 RX ADMIN — MUPIROCIN 1 APPLICATION: 20 OINTMENT TOPICAL at 11:09

## 2020-08-18 RX ADMIN — HEPARIN SODIUM AND DEXTROSE 11.8 UNITS/KG/HR: 10000; 5 INJECTION INTRAVENOUS at 19:36

## 2020-08-18 RX ADMIN — CHLORHEXIDINE GLUCONATE 0.12% ORAL RINSE 15 ML: 1.2 LIQUID ORAL at 21:58

## 2020-08-18 RX ADMIN — FAMOTIDINE 20 MG: 20 TABLET ORAL at 17:15

## 2020-08-18 RX ADMIN — METOPROLOL TARTRATE 25 MG: 25 TABLET, FILM COATED ORAL at 09:00

## 2020-08-18 RX ADMIN — PANTOPRAZOLE SODIUM 40 MG: 40 INJECTION, POWDER, FOR SOLUTION INTRAVENOUS at 09:00

## 2020-08-18 RX ADMIN — ASPIRIN 81 MG 81 MG: 81 TABLET ORAL at 08:58

## 2020-08-18 NOTE — UTILIZATION REVIEW
Notification of Discharge  This is a Notification of Discharge from our facility [address]  Please be advised that this patient has been discharge from our facility  Below you will find the admission and discharge date and time including the patients disposition  PRESENTATION DATE: 8/15/2020  2:20 PM  OBS ADMISSION DATE: 08/15/2020  IP ADMISSION DATE: 8/16/20 1114   DISCHARGE DATE: 8/17/2020  7:33 PM  DISPOSITION: [address]   Admission Orders listed below:  Admission Orders (From admission, onward)     Ordered        08/16/20 1114  Inpatient Admission  Once         08/15/20 1641  Place in Observation  Once                   Please contact the UR Department if additional information is required to close this patient's authorization/case  [facility] Utilization Review Department  Main: [phone] x carefully listen to the prompts  All voicemails are confidential   [email or web address]  Send all requests for admission clinical reviews, approved or denied determinations and any other requests to dedicated fax number below belonging to the campus where the patient is receiving treatment   List of dedicated fax numbers:  [address] DENIALS (Administrative/Medical Necessity) [phone]   [address] (Maternity/NICU/Pediatrics) [phone]   Yumi Flatten [phone]   Izaiah Riggs [phone]   Lore Mann [phone]   [address] [phone]   [facility]  [phone]   [address] [place] [address], [address]  2401 [facility] [address] [facility] [phone]

## 2020-08-18 NOTE — ASSESSMENT & PLAN NOTE
· Appears to be euvolemic on examination  · Echocardiogram reviewed-normal ejection fraction  · Moderate to severe concentric hypertrophy likely secondary to uncontrolled blood pressure

## 2020-08-18 NOTE — H&P
H&P- Dinesh Silva 1955, 72 y o  male MRN: [id number]    Unit/Bed#: [facility] 419-01 Encounter: [id number]    Primary Care Provider: Hilda Mendoza MD   Date and time admitted to hospital: 8/17/2020  8:18 PM        * NSTEMI (non-ST elevated myocardial infarction) [facility])  Assessment & Plan  · Patient came to the hospital with substernal chest pain  Troponin elevated-last troponin is 13 6  ? Cardiac catheterization performed today - evidence of mid % occlusion, proximal LAD 50% stenosis and focal mid LAD 50% stenosis   · ECHO with EF 60%, moderate to severe concentric hypertrophy, severe aortic stenosis  · Patient is transferred over here to be evaluated by CT surgery  · Consult Cardiology and CT surgery  · Continue with heparin/aspirin/beta-blocker and statin    Aortic stenosis  Assessment & Plan  · Noted to have severe aortic stenosis on the echocardiogram  · Monitor  · Cardiology evaluation/CT surgery evaluation    Leukocytosis  Assessment & Plan  · Likely reactive  · Monitor    Elevated brain natriuretic peptide (BNP) level  Assessment & Plan  · Appears to be euvolemic on examination  · Echocardiogram reviewed-normal ejection fraction  · Moderate to severe concentric hypertrophy likely secondary to uncontrolled blood pressure    Accelerated hypertension  Assessment & Plan  · At the time of presentation to the [facility] blood pressure was about 200  Patient was not any antihypertensive before coming to the hospital and metoprolol was added  · Blood pressure appears to be much better controlled  · Monitor for now        VTE Prophylaxis: Heparin Drip  / sequential compression device   Code Status:  Full code  POLST: POLST form is not discussed and not completed at this time  Discussion with family:     Anticipated Length of Stay:  Patient will be admitted on an Inpatient basis with an anticipated length of stay of  >2 midnights     Justification for Hospital Stay:  Non STEMI    Total Time for Visit, including Counseling / Coordination of Care: 70 minutes  Greater than 50% of this total time spent on direct patient counseling and coordination of care  Chief Complaint:   Chest pain    History of Present Illness:    Ray Prabhakar is a 72 y o  male who presented to [facility] with chest pain  Patient had troponin elevation at the time of presentation  Patient also noted to have elevated blood pressure at the time of presentation to the emergency room which is improved with the metoprolol  Spine currently resolved and patient had a cardiac catheterization today showed multivessel coronary artery disease and also severe aortic stenosis on the echocardiogram and patient was transferred over here to be evaluated by Cardiology and CT surgery for possible surgical intervention  Patient denied any chest pain or shortness of breath currently  Do not appear to be in any acute distress    Review of Systems:    Review of Systems   Constitutional: Negative  HENT: Negative  Eyes: Negative  Respiratory: Negative  Cardiovascular: Positive for chest pain  Gastrointestinal: Negative  Endocrine: Negative  Genitourinary: Negative  Musculoskeletal: Negative  Neurological: Negative  Hematological: Negative  Psychiatric/Behavioral: Negative  Past Medical and Surgical History:     No past medical history on file  Past Surgical History:   Procedure Laterality Date    KNEE SURGERY         Meds/Allergies:    Prior to Admission medications    Not on File     I have reviewed home medications with patient personally  Allergies:    Allergies   Allergen Reactions    Contrast Dye [Iodinated Diagnostic Agents] Anaphylaxis       Social History:     Marital Status: /Civil Union   Occupation:  Occupational therapist  Patient Pre-hospital Living Situation:   Patient Pre-hospital Level of Mobility:  Independent  Patient Pre-hospital Diet Restrictions:   Substance Use History:   Social History     Substance and Sexual Activity   Alcohol Use Not Currently     Social History     Tobacco Use   Smoking Status Never Smoker   Smokeless Tobacco Never Used     Social History     Substance and Sexual Activity   Drug Use Not Currently       Family History:    No family history on file  Physical Exam:     Vitals:   Height: 5' 3" (160 cm) (08/17/20 2000)  Weight - Scale: 83 kg (182 lb 15 7 oz) (08/17/20 2000)    Physical Exam  Constitutional:       General: He is not in acute distress  Appearance: Normal appearance  He is not ill-appearing  HENT:      Head: Normocephalic and atraumatic  Nose: No congestion or rhinorrhea  Eyes:      Conjunctiva/sclera: Conjunctivae normal    Neck:      Musculoskeletal: Normal range of motion  Cardiovascular:      Rate and Rhythm: Normal rate and regular rhythm  Pulses: Normal pulses  Heart sounds: No murmur  Pulmonary:      Effort: Pulmonary effort is normal    Abdominal:      General: Abdomen is flat  Bowel sounds are normal    Musculoskeletal: Normal range of motion  Comments: Small scabbed wound on the left lower extremity   Skin:     General: Skin is warm  Neurological:      General: No focal deficit present  Mental Status: He is alert  Mental status is at baseline  Additional Data:     Lab Results: I have personally reviewed pertinent reports        Results from last 7 days   Lab Units 08/17/20  0453 08/15/20  1453   WBC Thousand/uL 19 16* 10 90*   HEMOGLOBIN g/dL 14 4 15 4   HEMATOCRIT % 43 9 48 0   PLATELETS Thousands/uL 230 234   NEUTROS PCT %  --  84*   LYMPHS PCT %  --  9*   MONOS PCT %  --  4   EOS PCT %  --  1     Results from last 7 days   Lab Units 08/17/20  0453 08/15/20  1453   SODIUM mmol/L 141 142   POTASSIUM mmol/L 4 2 4 6   CHLORIDE mmol/L 106 103   CO2 mmol/L 26 29   BUN mg/dL 22 10   CREATININE mg/dL 1 12 1 07   ANION GAP mmol/L 9 10   CALCIUM mg/dL 9 0 10 1   ALBUMIN g/dL  --  4 3 TOTAL BILIRUBIN mg/dL  --  0 50   ALK PHOS U/L  --  79   ALT U/L  --  29   AST U/L  --  20   GLUCOSE RANDOM mg/dL 126 113     Results from last 7 days   Lab Units 08/16/20  0011   INR  1 11                   Imaging: I have personally reviewed pertinent reports  No orders to display       EKG, Pathology, and Other Studies Reviewed on Admission:   · EKG:     Allscripts / Epic Records Reviewed: Yes     ** Please Note: This note has been constructed using a voice recognition system   **

## 2020-08-18 NOTE — UTILIZATION REVIEW
Initial Clinical Review    Admission: Date/Time/Statement:   Admission Orders (From admission, onward)     Ordered        08/17/20 2028  Inpatient Admission  Once                   Orders Placed This Encounter   Procedures    Inpatient Admission     Standing Status:   Standing     Number of Occurrences:   1     Order Specific Question:   Admitting Physician     Answer:   Aba Bateman [[id number]]     Order Specific Question:   Level of Care     Answer:   Med Surg [16]     Order Specific Question:   Estimated length of stay     Answer:   More than 2 Midnights     Order Specific Question:   Certification     Answer:   I certify that inpatient services are medically necessary for this patient for a duration of greater than two midnights  See H&P and MD Progress Notes for additional information about the patient's course of treatment  Assessment/Plan: 72year old male, presented to the ED @ [facility], Admitted, Transferred to [facility], higher level of care via EMS  Admitted as Inpatient due to NSTEMI  Cardiac catheterization today showed multivessel coronary artery disease and also severe aortic stenosis on the echocardiogram and patient was transferred over here to be evaluated by Cardiology and CT surgery for possible surgical intervention  VTE Prophylaxis: Heparin Drip  / sequential compression device     08/18/2020  Progress Note:  Continue appropriate medical therapy with ASA / Statin and BB and IV heparin gtt       08/18/2020  Progress Note:  CT Surgery:  aortic valve replacement and coronary artery bypass grafting were discussed in detail today with the patient  They understand and wish to proceed with further workup and ultimately surgical intervention  We have ordered routine preoperative laboratory and vascular studies  Pending the results of these tests, they will be scheduled for surgery with SIMONE Cano       Triage Vitals   Temperature Pulse Respirations Blood Pressure SpO2 08/17/20 2253 08/17/20 2201 08/17/20 2253 08/17/20 2201 08/17/20 2253   97 7 °F (36 5 °C) 67 18 134/69 95 %      Temp Source Heart Rate Source Patient Position - Orthostatic VS BP Location FiO2 (%)   08/17/20 2253 -- 08/17/20 2253 08/17/20 2253 --   Oral  Lying Left arm       Pain Score       08/17/20 2000       No Pain          Wt Readings from Last 1 Encounters:   08/17/20 83 kg (182 lb 15 7 oz)     Additional Vital Signs:   Date/Time   Temp   Pulse   Resp   BP   MAP (mmHg)   SpO2   O2 Device   Patient Position - Orthostatic VS    08/18/20 1457   98 3 °F (36 8 °C)   60   18   121/92   99   96 %   None (Room air)   Sitting    08/18/20 1100   98 2 °F (36 8 °C)   76   18   140/66   95   96 %      Sitting    08/18/20 0700   97 6 °F (36 4 °C)   60   18   153/80   100   94 %      Lying    08/18/20 0314   97 9 °F (36 6 °C)   58   18   170/77   111   92 %      Lying      Date and Time  Eye Opening  Best Verbal Response  Best Motor Response  Sims Coma Scale Score    08/18/20 0800  4  5  6  15    08/17/20 2000  4  5  6  15    08/17/20 0835  4  5  6  15    08/17/20 0301  4  5  6  15    08/16/20 0731  4  5  6  15    08/15/20 1958  4  5  6  15    08/15/20 1751  4  5  6  15    08/15/20 1428  4  5  6  15      08/18/2020 @ 1453  panorex mandible:  No fracture  Multiple carious teeth   Possible right maxillary 1st bicuspid periapical lucency  08/17/2020  SUMMARY:  CORONARY CIRCULATION:  The coronary circulation is right dominant  LAD: The vessel was large sized  Proximal LAD 50% stenosis(seen in LE/Maori CAU view) s/p negative iFR 0 96  Mid LAD has focal 50% stenosis at the origin of D2 s/p positive iFR 0 88 with brisk flow distally  D2 small vessel with proximal 50% stenosis  Circumflex: The vessel was large sized  Mid % occluded  OM2 fills from right to left collaterals  OM1 is medium caliber long vessel with luminal irreguilarities  RCA: The vessel was normal sized   Angiography showed mild atherosclerosis  Right to left collateral visualized filling OM  Severe aortic stenosis by echo  Mild pulmonary hypertension , elevated LVEDP  RECOMMENDATIONS:  CT surgery evaluation    Pertinent Labs/Diagnostic Test Results:   Results from last 7 days   Lab Units 08/18/20  1108   SARS-COV-2  Negative     Results from last 7 days   Lab Units 08/18/20  0530 08/17/20  0453 08/15/20  1453   WBC Thousand/uL 13 81* 19 16* 10 90*   HEMOGLOBIN g/dL 13 8 14 4 15 4   HEMATOCRIT % 42 0 43 9 48 0   PLATELETS Thousands/uL 188 230 234   NEUTROS ABS Thousands/µL 10 96*  --  9 28*     Results from last 7 days   Lab Units 08/18/20  0554 08/17/20  0453 08/15/20  1453   SODIUM mmol/L 141 141 142   POTASSIUM mmol/L 3 8 4 2 4 6   CHLORIDE mmol/L 109* 106 103   CO2 mmol/L 27 26 29   ANION GAP mmol/L 5 9 10   BUN mg/dL 20 22 10   CREATININE mg/dL 0 89 1 12 1 07   EGFR ml/min/1 73sq m 90 69 72   CALCIUM mg/dL 8 7 9 0 10 1     Results from last 7 days   Lab Units 08/15/20  1453   AST U/L 20   ALT U/L 29   ALK PHOS U/L 79   TOTAL PROTEIN g/dL 8 2   ALBUMIN g/dL 4 3   TOTAL BILIRUBIN mg/dL 0 50     Results from last 7 days   Lab Units 08/18/20  0554 08/17/20  0453 08/15/20  1453   GLUCOSE RANDOM mg/dL 86 126 113     Results from last 7 days   Lab Units 08/16/20  1830 08/16/20  1543 08/16/20  1035 08/16/20  0634 08/16/20  0011 08/15/20  1955/20  1453   TROPONIN I ng/mL 13 16* 9 78* 12 79* 11 49* 9 67* 5 96* 0 22*     Results from last 7 days   Lab Units 08/18/20  1137 08/18/20  0551 08/17/20  2123  08/16/20  0011   PROTIME seconds  --   --  13 1  --  13 8   INR   --   --  0 99  --  1 11   PTT seconds 91* 70* 24   < > 80*    < > = values in this interval not displayed       Results from last 7 days   Lab Units 08/15/20  1453   NT-PRO BNP pg/mL 804*     Results from last 7 days   Lab Units 08/18/20  1116   CLARITY UA  Clear   COLOR UA  Yellow   SPEC GRAV UA  1 025   PH UA  6 0   GLUCOSE UA mg/dl Negative   KETONES UA mg/dl Negative BLOOD UA  Negative   PROTEIN UA mg/dl Negative   NITRITE UA  Negative   BILIRUBIN UA  Negative   UROBILINOGEN UA E U /dl 1 0   LEUKOCYTES UA  Negative     Past Medical History:   Diagnosis Date    HLD (hyperlipidemia)     HTN (hypertension)      Present on Admission:   Accelerated hypertension   Elevated brain natriuretic peptide (BNP) level   NSTEMI (non-ST elevated myocardial infarction) (HCC)   Leukocytosis   HTN (hypertension)   HLD (hyperlipidemia)    Admitting Diagnosis: Chest pain [R07 9]  Age/Sex: 72 y o  male  Admission Orders:  Scheduled Medications:  aspirin, 81 mg, Oral, Daily  atorvastatin, 40 mg, Oral, QPM  chlorhexidine, 15 mL, Mouth/Throat, Q12H ARVIND  famotidine, 20 mg, Oral, BID  loratadine, 10 mg, Oral, Daily  metoprolol tartrate, 25 mg, Oral, Q12H ARVIND  mupirocin, 1 application, Nasal, J27N ARVIND  pantoprazole, 40 mg, Intravenous, Q24H ARVIND    Continuous IV Infusions:  heparin (porcine), 3-20 Units/kg/hr (Order-Specific), Intravenous, Titrated    PRN Meds:  acetaminophen, 650 mg, Oral, Q4H PRN  calcium carbonate, 1,000 mg, Oral, Daily PRN  diphenhydrAMINE, 25 mg, Intravenous, Q6H PRN  nitroglycerin, 0 4 mg, Sublingual, Q5 Min PRN  ondansetron, 4 mg, Intravenous, Q6H PRN  simethicone, 80 mg, Oral, 4x Daily PRN  sodium chloride (PF), 3 mL, Intravenous, Q1H PRN  sodium chloride (PF), 3 mL, Intravenous, Q1H PRN    Telemetry  O2 @ 2l via NC  IP CONSULT TO [address] [place] [address] Utilization Review Department  [email or web address]  ATTENTION: Please call with any questions or concerns to [phone] and carefully listen to the prompts so that you are directed to the right person  All voicemails are confidential   Wagner Calvo all requests for admission clinical reviews, approved or denied determinations and any other requests to dedicated fax number below belonging to the campus where the patient is receiving treatment   List of dedicated fax numbers for the Facilities:  FACILITY NAME UR FAX NUMBER   ADMISSION DENIALS (Administrative/Medical Necessity) [phone]   [facility] (Maternity/NICU/Pediatrics) [phone]   ST Kasandra Galeas [phone]   Patricia Harry [phone]   Oscar Dowling [phone]   Anabellealejandra Watsonindira [address] [phone]   [facility]  [phone] [address] [place] [address], [address]  2401 [facility] [address] [facility] [phone]

## 2020-08-18 NOTE — CONSULTS
Oral and Maxillofacial Surgery Consult    Pt seen 08/18/20 5:45 PM     Assessment  72 y o  male evaluated for dental clearance prior to CABG and valve replacement surgery  On bedside dental exam, patient has no intra-oral complaints and shows no signs of acute odontogenic infection  Mandible panorex reveals decayed and missing teeth and periapical lucencies a/w teeth #5,7  Considering patient`s medical status and clinical exam findings, will recommend outpatient follow-up after cardiac surgery and discharge  Plan:   -No acute surgical intervention planned with OMFS   - follow up at outpatient [facility] -- patient can call [phone] to schedule an appointment for exam and extraction of carious teeth after cardiac surgery  - Encourage good oral hygiene with toothbrush/toothpaste TID  - Peridex 0 12% rinces BID x 10 days        D/w OMFS attg on call Dr Cydney Arenas          Inpatient consult to Oral and Maxillofacial Surgery     Performed by  Narcisa Evans     Authorized by Zahraa Sanchez PA-C               HPI: 72 y o  male w PMH of HTN, HLD currently admitted with recent chest pain and NSTEMI  Consult requested by cardiac surgery for dental evaluation prior to CABG/valve replacement surgery  Pt denies any dental pain or complaints  States he has not seen a dentist in a very long time  Also states he has multiple "broken teeth" that were fractured and carious for a very long time  Denies fever, chills, nausea, odynophagia, dysphagia, dyspnea, shortness of breath  PMH:   Past Medical History:   Diagnosis Date    HLD (hyperlipidemia)     HTN (hypertension)         Allergies:    Allergies   Allergen Reactions    Contrast Dye [Iodinated Diagnostic Agents] Anaphylaxis       Meds:     Current Facility-Administered Medications:     acetaminophen (TYLENOL) tablet 650 mg, 650 mg, Oral, Q4H PRN, Erika Mccall MD    aspirin chewable tablet 81 mg, 81 mg, Oral, Daily, Erika Mccall MD, 81 mg at 08/18/20 0858    atorvastatin (LIPITOR) tablet 40 mg, 40 mg, Oral, QPM, Aurelio Hameed MD, 40 mg at 08/18/20 1715    calcium carbonate (TUMS) chewable tablet 1,000 mg, 1,000 mg, Oral, Daily PRN, Aurelio Hameed MD    chlorhexidine (PERIDEX) 0 12 % oral rinse 15 mL, 15 mL, Mouth/Throat, Q12H [address], Blessing Carson PA-C, 15 mL at 08/18/20 1108    [START ON 8/19/2020] chlorhexidine (PERIDEX) 0 12 % oral rinse 15 mL, 15 mL, Swish & Spit, Once, Yulisa Bar PA-C    diphenhydrAMINE (BENADRYL) injection 25 mg, 25 mg, Intravenous, Q6H PRN, Laly Arriaga PA-C    famotidine (PEPCID) tablet 20 mg, 20 mg, Oral, BID, Aurelio Hameed MD, 20 mg at 08/18/20 1715    heparin (porcine) 25,000 units in 250 mL infusion (premix), 3-20 Units/kg/hr (Order-Specific), Intravenous, Titrated, Yulisa Bar PA-C, Last Rate: 10 mL/hr at 08/18/20 1413, 11 8 Units/kg/hr at 08/18/20 1413    loratadine (CLARITIN) tablet 10 mg, 10 mg, Oral, Daily, MD Irene Nicolas  [START ON 8/19/2020] metoprolol tartrate (LOPRESSOR) partial tablet 12 5 mg, 12 5 mg, Oral, Once, Yulisa Bar PA-C    metoprolol tartrate (LOPRESSOR) tablet 25 mg, 25 mg, Oral, Q12H [address], Aurelio Hameed MD, 25 mg at 08/18/20 0900    mupirocin (BACTROBAN) 2 % nasal ointment 1 application, 1 application, Nasal, F61Q [address], Ryan Carmichael PA-C, 1 application at 03/90/84 1109    [START ON 8/19/2020] mupirocin (BACTROBAN) 2 % nasal ointment 1 application, 1 application, Nasal, Once, Yulisa Barefoot, CHELY    nitroglycerin (NITROSTAT) SL tablet 0 4 mg, 0 4 mg, Sublingual, Q5 Min PRN, Aurelio Hameed MD    [facility]etron [facility]) injection 4 mg, 4 mg, Intravenous, Q6H PRN, MD Irene Nicolas  [START ON 8/19/2020] pantoprazole (PROTONIX) EC tablet 40 mg, 40 mg, Oral, Early Morning, Gerry Moon MD    simethicone (MYLICON) chewable tablet 80 mg, 80 mg, Oral, 4x Daily PRN, Aurelio Hameed MD    [COMPLETED] Insert peripheral IV, , , Once **AND** sodium chloride (PF) 0 9 % injection 3 mL, 3 mL, Intravenous, Q1H PRN, Carole Hoffmann MD    [COMPLETED] Insert peripheral IV, , , Once **AND** sodium chloride (PF) 0 9 % injection 3 mL, 3 mL, Intravenous, Q1H PRN, Carole Hoffmann MD    PSH:   Past Surgical History:   Procedure Laterality Date    KNEE ARTHROSCOPY Left     ROTATOR CUFF REPAIR Left       History reviewed  No pertinent family history  Review of Systems   Constitutional: Negative for chills and fever  HENT: Positive for dental problem (fractured and carious teeth)  Negative for facial swelling, trouble swallowing and voice change  Gastrointestinal: Negative for nausea and vomiting  Temp:  [97 6 °F (36 4 °C)-98 3 °F (36 8 °C)] 98 3 °F (36 8 °C)  HR:  [58-77] 60  Resp:  [18] 18  BP: (121-170)/(66-92) 121/92  SpO2:  [92 %-96 %] 96 %       Intake/Output Summary (Last 24 hours) at 8/18/2020 1745  Last data filed at 8/18/2020 1411  Gross per 24 hour   Intake 1294 ml   Output 400 ml   Net 894 ml        Physical Exam:  GE: AAOx3  NAD  HEENT:    Head: no facial swelling  No trismus or tenderness to palpation  No LAD  Inferior border of the mandible is palpable  Mouth: ~40mm JOSÉ MIGUEL  Multiple missing teeth, multiple fractured and decayed teeth and root tips, no vestibular swelling or purulence or sinus tract  FOM soft/non-tender/non-elevated  Uvula midline     Resp: no respiratory distress on RA  CVS: RRR    Lab Results:   CBC:   Lab Results   Component Value Date    WBC 13 81 (H) 08/18/2020    HGB 13 8 08/18/2020    HCT 42 0 08/18/2020    MCV 86 08/18/2020     08/18/2020    MCH 28 3 08/18/2020    MCHC 32 9 08/18/2020    RDW 13 6 08/18/2020    MPV 12 5 08/18/2020    NRBC 0 08/18/2020   , CMP:   Lab Results   Component Value Date    SODIUM 141 08/18/2020    K 3 8 08/18/2020     (H) 08/18/2020    CO2 27 08/18/2020    BUN 20 08/18/2020    CREATININE 0 89 08/18/2020    CALCIUM 8 7 08/18/2020    EGFR 90 08/18/2020   , Coagulation:   Lab Results   Component Value Date INR 0 99 08/17/2020     Imaging: I have personally reviewed pertinent films in PACS   Panoramic x-ray shows multiple missing teeth, root tips and decayed teeth   Periapical lucencies noted on teeth #5,7

## 2020-08-18 NOTE — ASSESSMENT & PLAN NOTE
· Patient came to the hospital with substernal chest pain  Troponin elevated-last troponin is 13 6  ?  Cardiac catheterization performed today - evidence of mid % occlusion, proximal LAD 50% stenosis and focal mid LAD 50% stenosis   · ECHO with EF 60%, moderate to severe concentric hypertrophy, severe aortic stenosis  · Patient is transferred over here to be evaluated by CT surgery  · Consult Cardiology and CT surgery  · Continue with heparin/aspirin/beta-blocker and statin

## 2020-08-18 NOTE — ASSESSMENT & PLAN NOTE
· At the time of presentation to the [facility] blood pressure was about 200    Patient was not any antihypertensive before coming to the hospital and metoprolol was added  · Blood pressure appears to be much better controlled  · Monitor for now

## 2020-08-18 NOTE — ASSESSMENT & PLAN NOTE
· At the time of presentation to the [facility] blood pressure was about 200    Patient was not any antihypertensive before coming to the hospital and metoprolol was added  · Blood pressure appears to be much better controlled  · Monitor for now

## 2020-08-18 NOTE — SOCIAL WORK
Pt is transferred to [facility] from [facility] where pt was originally admitted on 8/15/20  CM obtained all the following info about the pt  HOME: Pt resides in a 2-story house w/ 13 steps between floors and a ramp to enter at front door  LIVES W/: Wife and 2 teenaged daughters  : Pt's wife Radha Alfred (c: [phone])  INDEPENDENCE: Pt is independent at baseline w/ ambulating and performing his ADLS  DME: None reported  HHC: No hx of services reported  I/P REHAB: No hx of placements reported  MENTAL HEALTH ISSUES: No hx reported  D&A ISSUES: No hx reported  PCP: Dr García Moran located in [address] [place] [address]  PHARMACY: [email or web address] located on [facility] in [place] [address]  INCOME SOURCE: Full-time employment  INSURANCE: [employer] plan through his employer  MEDICAL POA: No one is pre-designated / Pt's wife is POA by legal default if needed  TRANSPORTATION AT D/C: Pt's family members will provide transport  CM reviewed d/c planning process including the following: identifying help at home, patient preference for d/c planning needs, Discharge Lounge, [facility] Meds to Bed program, availability of treatment team to discuss questions or concerns patient and/or family may have regarding understanding medications and recognizing signs and symptoms once discharged  CM also encouraged patient to follow up with all recommended appointments after discharge  Patient advised of importance for patient and family to participate in managing patients medical well being  Patient/caregiver received discharge checklist  Content reviewed  Patient/caregiver encouraged to participate in discharge plan of care prior to discharge home

## 2020-08-18 NOTE — PROGRESS NOTES
General Cardiology   Progress Note -  Team One   [facility] 72 y o  male MRN: [id number]    Unit/Bed#: [facility] 419-01 Encounter: [id number]    Assessment:    1  Multivessel CAD:   2  Severe Aortic stenosis  3  HTN  4  HLD    Plan:  Pt transferred to [facility] for Cardiac surgery evaluation; he is undergoing routine workup for possible CABG/AVR  He remains chest pain free, on appropriate medical therapy with ASA, statin, BB and heparin infusion  VS and labs stable  Continue above, pt advised to let nurse know if he develops any chest discomfort  Surgical planning per cardiac surgery team        Subjective: Pt seen for follow up; seen previously at [facility] where he presented with chest and back pain; dx NSTEMI; underwent cardiac cath yesterday with MV CAD and found to have mod to severe AS on echo with preserved LV function, Tx to [facility] for cardiac surgery evaluation  At time of my exam he reports feeling well; he has not had any recurrent chest pain  Denies any current symptoms including sob, palpitations, LE edema, dizziness or lightheadedness  Review of Systems   Constitution: Negative for decreased appetite and fever  Cardiovascular: Negative for chest pain, dyspnea on exertion, leg swelling, orthopnea, palpitations and syncope  Respiratory: Negative for cough, shortness of breath and wheezing  Gastrointestinal: Negative for abdominal pain, nausea and vomiting  Genitourinary: Negative for dysuria  Neurological: Negative for dizziness and light-headedness  Psychiatric/Behavioral: Negative for altered mental status  All other systems reviewed and are negative  Objective:   Vitals: Blood pressure 153/80, pulse 60, temperature 97 6 °F (36 4 °C), temperature source Oral, resp  rate 18, height 5' 3" (1 6 m), weight 83 kg (182 lb 15 7 oz), SpO2 94 %  ,     Body mass index is 32 41 kg/m²  ,     Systolic (96NJR), TRM:050 , Min:115 , CGU:355     Diastolic (40PYL), XWM:81, Min:66, Max:80      Intake/Output Summary (Last 24 hours) at 8/18/2020 1043  Last data filed at 8/18/2020 0820  Gross per 24 hour   Intake 250 ml   Output    Net 250 ml     Weight (last 2 days)     Date/Time   Weight    08/17/20 2000   83 (182 98)            Telemetry Review:   Sinus rhythm, intermittent sinus tachycardia; no significant events    Physical Exam  Vitals signs reviewed  Constitutional:       General: He is not in acute distress  Appearance: Normal appearance  He is not ill-appearing  HENT:      Head: Normocephalic and atraumatic  Neck:      Comments: No JVD  Cardiovascular:      Rate and Rhythm: Normal rate and regular rhythm  Heart sounds: S1 normal and S2 normal  No murmur  Pulmonary:      Effort: Pulmonary effort is normal       Breath sounds: Normal breath sounds  No wheezing or rales  Comments: LS CTA on RA  Abdominal:      General: Abdomen is flat  Musculoskeletal:      Right lower leg: No edema  Left lower leg: No edema  Skin:     General: Skin is warm and dry  Neurological:      Mental Status: He is alert and oriented to person, place, and time     Psychiatric:         Mood and Affect: Mood normal          Behavior: Behavior normal        LABORATORY RESULTS  Results from last 7 days   Lab Units 08/16/20  1830 08/16/20  1543 08/16/20  1035   TROPONIN I ng/mL 13 16* 9 78* 12 79*     CBC with diff:   Results from last 7 days   Lab Units 08/18/20  0530 08/17/20  0453 08/15/20  1453   WBC Thousand/uL 13 81* 19 16* 10 90*   HEMOGLOBIN g/dL 13 8 14 4 15 4   HEMATOCRIT % 42 0 43 9 48 0   MCV fL 86 85 85   PLATELETS Thousands/uL 188 230 234   MCH pg 28 3 28 0 27 4   MCHC g/dL 32 9 32 8 32 1   RDW % 13 6 13 4 13 2   MPV fL 12 5 12 0 12 0   NRBC AUTO /100 WBCs 0  --  0     CMP:  Results from last 7 days   Lab Units 08/18/20  0554 08/17/20  0453 08/15/20  1453   POTASSIUM mmol/L 3 8 4 2 4 6   CHLORIDE mmol/L 109* 106 103   CO2 mmol/L 27 26 29   BUN mg/dL 20 22 10   CREATININE mg/dL 0 89 1 12 1 07   CALCIUM mg/dL 8 7 9 0 10 1   AST U/L  --   --  20   ALT U/L  --   --  29   ALK PHOS U/L  --   --  79   EGFR ml/min/1 73sq m 90 69 72     BMP:  Results from last 7 days   Lab Units 20  0554 20  0453 08/15/20  1453   POTASSIUM mmol/L 3 8 4 2 4 6   CHLORIDE mmol/L 109* 106 103   CO2 mmol/L 27 26 29   BUN mg/dL 20 22 10   CREATININE mg/dL 0 89 1 12 1 07   CALCIUM mg/dL 8 7 9 0 10 1     Lab Results   Component Value Date    NTBNP 804 (H) 08/15/2020     Results from last 7 days   Lab Units 20  2123 20  0011   INR  0 99 1 11     Lipid Profile:   No results found for: CHOL  Lab Results   Component Value Date    HDL 39 (L) 2020    HDL 36 (L) 2020     Lab Results   Component Value Date    LDLCALC 109 (H) 2020    LDLCALC 132 (H) 2020     Lab Results   Component Value Date    TRIG 285 (H) 2020    TRIG 150 2020     Cardiac testing:   Results for orders placed during the hospital encounter of 08/15/20   Echo complete with contrast if indicated    Narrative [address]  [place], [place] [address]  [phone]    Transthoracic Echocardiogram  2D, M-mode, and Color Doppler    Study date:  16-Aug-2020    Patient: Tiffany Hector  MR number: [id number]  Account number: [de-identified]  : 1955  Age: 72 years  Gender: Male  Status: Inpatient  Location: Bedside  Height: 63 in  Weight: 190 lb  BP: 133/ 63 mmHg    Indications: Murmur  Diagnoses: R01 1 - Cardiac murmur, unspecified    Sonographer:  Alexander RCS  Referring Physician:  Drew Zaldivar  Group:  [facility]  Interpreting Physician:  Stefano Cantu MD    SUMMARY    LEFT VENTRICLE:  Systolic function was normal  Ejection fraction was estimated to be 60 %  There were no regional wall motion abnormalities  There was moderate-severe concentric hypertrophy      RIGHT VENTRICLE:  The size was normal   Systolic function was normal     MITRAL VALVE:  There was mild annular calcification  There was mild stenosis  There was mild to moderate regurgitation  AORTIC VALVE:  The valve was not visualized well enough to rule out a bicuspid morphology  Leaflets exhibited increased thickness and calcification with reduced cuspal separation  Transaortic velocity and gradient were increased due to stenosis as well as concomitant increased transaortic flow  There was severe stenosis  Peak and mean AV gradients were 102 and 57 mm Hg respectively  KAUR by the continuity equation method was 0 6 sq cm  There was moderate regurgitation  TRICUSPID VALVE:  There was trace regurgitation  Estimated peak PA pressure was 44 mmHg  PULMONIC VALVE:  There was trace regurgitation  HISTORY: PRIOR HISTORY: NSTEMI  Hypertension  PROCEDURE: The procedure was performed at the bedside  This was a routine study  The transthoracic approach was used  The study included complete 2D imaging, M-mode, and color Doppler  The heart rate was 84 bpm, at the start of the study  Images were obtained from the parasternal, apical, subcostal, and suprasternal notch acoustic windows  Image quality was adequate  LEFT VENTRICLE: Size was normal  Systolic function was normal  Ejection fraction was estimated to be 60 %  There were no regional wall motion abnormalities  There was moderate-severe concentric hypertrophy  No evidence of apical thrombus  DOPPLER: Left ventricular diastolic function parameters were normal     RIGHT VENTRICLE: The size was normal  Systolic function was normal  Wall thickness was normal     LEFT ATRIUM: Size was normal     RIGHT ATRIUM: Size was normal     MITRAL VALVE: There was mild annular calcification  There was normal leaflet separation  DOPPLER: There was mild stenosis  There was mild to moderate regurgitation  AORTIC VALVE: The valve was not visualized well enough to rule out a bicuspid morphology   Leaflets exhibited increased thickness and calcification with reduced cuspal separation  DOPPLER: Transaortic velocity and gradient were increased  due to stenosis as well as concomitant increased transaortic flow  There was severe stenosis  Peak and mean AV gradients were 102 and 57 mm Hg respectively  KAUR by the continuity equation method was 0 6 sq cm  There was moderate  regurgitation  TRICUSPID VALVE: The valve structure was normal  There was normal leaflet separation  DOPPLER: The transtricuspid velocity was within the normal range  There was no evidence for stenosis  There was trace regurgitation  Estimated peak PA  pressure was 44 mmHg  PULMONIC VALVE: Leaflets exhibited normal thickness, no calcification, and normal cuspal separation  DOPPLER: The transpulmonic velocity was within the normal range  There was trace regurgitation  PERICARDIUM: There was no pericardial effusion  The pericardium was normal in appearance  AORTA: The root exhibited normal size  SYSTEMIC VEINS: IVC: The inferior vena cava was normal in size  The respirophasic change in diameter was less than 50%  SYSTEM MEASUREMENT TABLES    2D  %FS: 25 1 %  Ao Diam: 3 7 cm  EDV(Teich): 88 3 ml  EF(Teich): 49 8 %  ESV(Teich): 44 3 ml  IVSd: 1 8 cm  LA Area: 16 9 cm2  LA Diam: 4 3 cm  LVEDV MOD A4C: 123 9 ml  LVEF MOD A4C: 50 7 %  LVESV MOD A4C: 61 ml  LVIDd: 4 4 cm  LVIDs: 3 3 cm  LVLd A4C: 8 9 cm  LVLs A4C: 8 1 cm  LVOT Diam: 1 9 cm  LVPWd: 1 4 cm  RA Area: 14 4 cm2  RVIDd: 3 3 cm  SV MOD A4C: 62 9 ml  SV(Teich): 44 ml    CW  AR Dec Evans: 2 3 m/s2  AR Dec Time: 1441 5 ms  AR PHT: 418 ms  AR Vmax: 3 2 m/s  AR maxP 5 mmHg  AV Env  Ti: 363 5 ms  AV VTI: 127 5 cm  AV Vmax: 4 8 m/s  AV Vmean: 3 5 m/s  AV maxP 7 mmHg  AV meanP 2 mmHg  TR Vmax: 3 m/s  TR maxP 2 mmHg    MM  TAPSE: 2 1 cm    PW  KAUR (VTI): 0 6 cm2  KAUR Vmax: 0 5 cm2  E': 0 1 m/s  E/E': 22 8  LVOT Env  Ti: 365 4 ms  LVOT VTI: 28 2 cm  LVOT Vmax: 0 9 m/s  LVOT Vmean: 0 8 m/s  LVOT maxPG: 3 6 mmHg  LVOT meanP 5 mmHg  LVSV Dopp: 77 2 ml  MV A Evens: 0 8 m/s  MV Dec Gaston: 3 6 m/s2  MV DecT: 354 5 ms  MV E Evens: 1 3 m/s  MV E/A Ratio: 1 6  MV PHT: 102 8 ms  MVA By PHT: 2 1 cm2    [facility] Accredited Echocardiography Laboratory    Prepared and electronically signed by    Jose Ham MD  Signed 16-Aug-2020 15:02:20       No results found for this or any previous visit  Results for orders placed during the hospital encounter of 08/15/20   Cardiac coronary angio/lhc/pci    Narrative [address] [place] [address] 89 [phone]    [facility]    Invasive Cardiovascular Lab Complete Report    Patient: Tha Simpson  MR number: [id number]  Account number: [de-identified]  Study date: 2020  Gender: Male  : 1955  Height: 63 in  Weight: 189 6 lb  BSA: 1 89 mï¾²    Allergies: IODINATED DIAGNOSTIC AGENTS    Diagnostic Cardiologist:  Kellie Ahmadi MD    SUMMARY    CORONARY CIRCULATION:  The coronary circulation is right dominant  LAD: The vessel was large sized  Proximal LAD 50% stenosis(seen in LE/Korean CAU view) s/p negative iFR 0 96  Mid LAD has focal 50% stenosis at the origin of D2 s/p positive iFR 0 88 with brisk flow distally  D2 small vessel with proximal 50% stenosis  Circumflex: The vessel was large sized  Mid % occluded  OM2 fills from right to left collaterals  OM1 is medium caliber long vessel with luminal irreguilarities  RCA: The vessel was normal sized  Angiography showed mild atherosclerosis  Right to left collateral visualized filling OM    INDICATIONS:  Possible CAD: myocardial infarction without ST elevation (NSTEMI)      IMPRESSIONS:  Mid % occlusion and distally it fills from right to left collaterals  Proximal LAD 50% stenosis s/p negative iFR - 0 96  Focal mid LAD 50% stenosis s/p positive iFR - 0 88  Mild atherosclerosis of proximal RCA  Severe aortic stenosis by echo  Mild pulmonary hypertension , elevated LVEDP    RECOMMENDATIONS:  CT surgery evaluation    DISCHARGE AND FOLLOW UP:  The patient left the catheterization laboratory in stable condition  INDICATIONS:  --  Possible CAD: myocardial infarction without ST elevation (NSTEMI)  --  Cardiac: aortic valve disease  PROCEDURES PERFORMED    --  Right heart catheterization  --  Left heart catheterization without ventriculogram   --  Left coronary angiography  --  Right coronary angiography  --  Diagnostic myocardial fractional flow reserve  --  Inpatient  --  Cardiac output/Weston method  --  Oxygen saturation sampling   --  Oxygen saturation sampling  --  Mod Sedation Same Physician Initial 15min  --  Mod Sedation Same Physician Add 15min  --  Mod Sedation Same Physician Add 15min  --  Mod Sedation Same Physician Add 15min  --  Coronary Catheterization (w/ LHC)  --  Myocardial Flow Reserve  --  Right Heart Catheterization  PROCEDURE: The risks and alternatives of the procedures and conscious sedation were explained to the patient and informed consent was obtained  The patient was brought to the cath lab and placed on the table  The planned puncture sites  were prepped and draped in the usual sterile fashion  --  Right radial artery access  After performing an Kishor's test to verify adequate ulnar artery supply to the hand, the radial site was prepped  The puncture site was infiltrated with local anesthetic  The vessel was accessed using the  modified Seldinger technique, a wire was advanced into the vessel, and a sheath was advanced over the wire into the vessel  --  Right brachial vein access  The puncture site was infiltrated with local anesthetic  The vessel was accessed using the modified Seldinger technique, a wire was advanced into the vessel, and a sheath was advanced over the wire into the  vessel  --  Right heart catheterization   A [place] Eli catheter was advanced under fluoroscopic guidance into the right heart and intracardiac pressures were obtained  --  Left heart catheterization without ventriculogram  A catheter was advanced over a guidewire into the ascending aorta  After recording ascending aortic pressure, the catheter was advanced across the aortic valve and left ventricular  pressure was recorded  The catheter was pulled back across the aortic valve and into the ascending aorta and pullback pressures were obtained  --  Left coronary artery angiography  A catheter was advanced over a guidewire into the aorta and positioned in the left coronary artery ostium under fluoroscopic guidance  Angiography was performed  --  Right coronary artery angiography  A catheter was advanced over a guidewire into the aorta and positioned in the right coronary artery ostium under fluoroscopic guidance  Angiography was performed  --  Myocardial fractional flow reserve  Flow reserve was measured using a 0 014" pressure-monitoring guide-wire  Steady baseline values were obtained  Mean arterial pressure and mean distal coronary pressures were then obtained at maximum  hyperemia  --  Inpatient  --  Cardiac output/Weston method  Blood samples were obtained and measurements of arterial and venous oxygen saturations were made and cardiac output measurements were obtained using the Weston equation  --  Oxygen saturation sampling  Blood samples were obtained from multiple chambers and oxygen saturations measured and intra-cardiac shunt calculated  --  Oxygen saturation sampling  Blood samples were obtained from multiple chambers and oxygen saturations measured and intra-cardiac shunt calculated  --  Mod Sedation Same Physician Initial 15min  --  Mod Sedation Same Physician Add 15min  --  Mod Sedation Same Physician Add 15min  --  Mod Sedation Same Physician Add 15min  --  Coronary Catheterization (w/ LHC)  --  Myocardial Flow Reserve  --  Right Heart Catheterization      LESION INTERVENTION:    --  Steady baseline values were obtained  Mean arterial pressure and mean distal coronary pressures were then obtained at maximum hyperemia  Based on the results, the lesion was judged to be significant  LM was engaged using JL3 5mm guide  Mid LAD ifr positive 0 88  Wire was renormalized and average iFR 0 88 which is positive  There was pressure jump in ifR pull back in mid LAD  Proximal LAD iFR negative 0 96  PROCEDURE COMPLETION: The patient tolerated the procedure well and was discharged from the cath lab  TIMING: Test started at 10:37  Test concluded at 11:54  HEMOSTASIS: The sheath was removed  The site was compressed manually  Hemostasis  was obtained  The sheath was removed  The site was compressed with a Hemoband device  Hemostasis was obtained  MEDICATIONS GIVEN: Hydrocortisone 100mg/ml, 100 mg, IV, at 10:38  Versed (2mg/2ml), 1 mg, IV, at 10:44  Fentanyl (1OOmcg/2 ml),  25 mcg, IV, at 10:44  1% Lidocaine, 2 ml, subcutaneously, at 10:49  Nitroglycerin (200mcg/ml), 200 mcg, at 10:51  Heparin 1000 units/ml, 5,000 units, IV, at 10:55  Heparin 1000 units/ml, 2,500 units, IV, at 11:13  1% Lidocaine, 2 ml,  subcutaneously, at 11:31  CONTRAST GIVEN: 100 ml Visipaque-(320mg I /ml)  RADIATION EXPOSURE: Fluoroscopy time: 7 05 min  HEMODYNAMICS: Right heart catheterization done via right brachial vein access using swan catheter    VALVES:  AORTIC VALVE:   --  There was severe aortic stenosis by Echo    CORONARY VESSELS:   --  The coronary circulation is right dominant  --  Left main: The vessel was normal sized  Angiography showed no evidence of disease  --  LAD: The vessel was large sized  Proximal LAD 50% stenosis(seen in LE/Indian CAU view) s/p negative iFR 0 96  Mid LAD has focal 50% stenosis at the origin of D2 s/p positive iFR 0 88 with brisk flow distally  D2 small vessel with proximal 50% stenosis  --  Circumflex: The vessel was large sized  Mid % occluded   OM2 fills from right to left collaterals  OM1 is medium caliber long vessel with luminal irreguilarities  --  RCA: The vessel was normal sized  Angiography showed mild atherosclerosis  Right to left collateral visualized filling OM    IMPRESSIONS:  Mid % occlusion and distally it fills from right to left collaterals  Proximal LAD 50% stenosis s/p negative iFR - 0 96  Focal mid LAD 50% stenosis s/p positive iFR - 0 88  Mild atherosclerosis of proximal RCA  Severe aortic stenosis by echo  Mild pulmonary hypertension , elevated LVEDP    RECOMMENDATIONS  CT surgery evaluation    DISPOSITION:  The patient left the catheterization laboratory in stable condition      Prepared and signed by    Sanket Rausch MD  Signed 2020 12:24:40    Study diagram    Hemodynamic tables    Pressures:  Baseline  Pressures:  - HR: 59  Pressures:  - Rhythm:  Pressures:  -- Aortic Pressure (S/D/M): 170/80/111  Pressures:  -- Arterial (S/D/M): 174/70/106  Pressures:  -- Pulmonary Artery (S/D/M): 74/28/41  Pressures:  -- Pulmonary Capillary Wedge: 45/50/35  Pressures:  -- Right Atrium (a/v/M): 19/17/15  Pressures:  -- Right Ventricle (s/edp): 71/29/--    O2 Sats:  Baseline  O2 Sats:  - HR: 59  O2 Sats:  - Rhythm:  O2 Sats:  -- AO: 14  55  O2 Sats:  -- PA: 14  83    Outputs:  Baseline  Outputs:  -- CALCULATIONS: [age]  Outputs:  -- CALCULATIONS: Body Surface Area: 1 89  Outputs:  -- CALCULATIONS: Height in cm: 160 00  Outputs:  -- CALCULATIONS: Sex: Male  Outputs:  -- CALCULATIONS: Weight in k 20  Outputs:  -- OUTPUTS: CO by Weston: 3 67  Outputs:  -- OUTPUTS: Weston cardiac index: 1 94  Outputs:  -- OUTPUTS: Weston HR: 55 00  Outputs:  -- OUTPUTS: O2 consumption: 209 83  Outputs:  -- OUTPUTS: Vo2 Indexed: 110 90  Outputs:  -- RESISTANCES: Left ventricular stroke work: 63 21  Outputs:  -- RESISTANCES: Left Ventricular Stroke Work index: 33 41  Outputs:  -- RESISTANCES: Pulmonary vascular index (dsc): 247 46  Outputs:  -- RESISTANCES: Pulmonary vascular index (Wood Units): 3 09  Outputs:  -- RESISTANCES: Pulmonary vascular resistance (dsc): 130 78  Outputs:  -- RESISTANCES: Pulmonary vascular resistance (Wood Units): 1 64  Outputs:  -- RESISTANCES: PVR_SVR Ratio: 0 06  Outputs:  -- RESISTANCES: Right ventricular stroke work: 34 14  Outputs:  -- RESISTANCES: Right ventricular stroke work index: 18 05  Outputs:  -- RESISTANCES: Systemic vascular index (dsc): 3959 28  Outputs:  -- RESISTANCES: Systemic vascular index (Wood Units): 49 50  Outputs:  -- RESISTANCES: Systemic vascular resistance (dsc): 2092 53  Outputs:  -- RESISTANCES: Systemic vascular resistance (Wood Units): 26 16  Outputs:  -- RESISTANCES: Total pulmonary index (dsc): 1690 94  Outputs:  -- RESISTANCES: Total pulmonary index (Wood Units): 21 14  Outputs:  -- RESISTANCES: Total pulmonary resistance (dsc): 893 69  Outputs:  -- RESISTANCES: Total pulmonary resistance (Wood Units): 11 17  Outputs:  -- RESISTANCES: Total vascular index (Wood Units): 57 24  Outputs:  -- RESISTANCES: Total vascular resistance (dsc): 2419 49  Outputs:  -- RESISTANCES: Total vascular resistance (Wood Units): 30 25  Outputs:  -- RESISTANCES: Total vascular resistance index (dsc): 4577 92  Outputs:  -- RESISTANCES: TPR_TVR Ratio: 0 37  Outputs:  -- SHUNTS: Pulmonary flow: 3 67  Outputs:  -- SHUNTS: Qp Indexed: 1 94  Outputs:  -- SHUNTS: Qs Indexed: 1 94  Outputs:  -- SHUNTS: Systemic flow: 3 67       Meds/Allergies   all current active meds have been reviewed  No medications prior to admission       heparin (porcine), 3-20 Units/kg/hr (Order-Specific), Last Rate: 13 8 Units/kg/hr (08/17/20 2202)      Assessment:  Principal Problem:    NSTEMI (non-ST elevated myocardial infarction) ([facility] [address] )  Active Problems:    Accelerated hypertension    Elevated brain natriuretic peptide (BNP) level    Leukocytosis    Aortic stenosis    HTN (hypertension)    HLD (hyperlipidemia)    Counseling / Coordination of Care  Total floor / unit time spent today 20 minutes  Greater than 50% of total time was spent with the patient and / or family counseling and / or coordination of care  ** Please Note: Dragon 360 Dictation voice to text software may have been used in the creation of this document   **

## 2020-08-18 NOTE — UTILIZATION REVIEW
Notification of Inpatient Admission/Inpatient Authorization Request   This is a Notification of Inpatient Admission for [address]  Be advised that this patient was admitted to our facility under Inpatient Status  Contact Colin Lassiter at [phone] for additional admission information  Julián Cali  DEPT  DEDICATED [phone]  Patient Name:   Sue Orr   YOB: 1955       [address]   [address]:   [place] [address]  Tax ID: [id number]  NPI: [id number] Attending Provider/NPI:  Phone:  Address: Javier King [[id number]]  [phone]  Same as HAILY/Vern Biggs [address]6 of Service Code: 24 Place of Service Name:  [address] [place] [address]   Start Date: 8/17/20 2018 Discharge Date & Time: No discharge date for patient encounter  Type of Admission: Inpatient Status Discharge Disposition (if discharged): [address] [place] [address]   Patient Diagnoses: Chest pain [R07 9]     Orders: Admission Orders (From admission, onward)     Ordered        08/17/20 2028  Inpatient Admission  Once                    Assigned Utilization Review Contact: Colin Lassiter  Utilization   Network Utilization Review Department  Phone: [phone]; Fax [phone]  Email: [email or web address]   ATTENTION PAYERS: Please call the assigned Utilization  directly with any questions or concerns ALL voicemails in the department are confidential  Send all requests for admission clinical reviews, approved or denied determinations and any other requests to dedicated fax number belonging to the campus where the patient is receiving treatment

## 2020-08-18 NOTE — ASSESSMENT & PLAN NOTE
· Patient came to the hospital with substernal chest pain  Troponin elevated-last troponin is 13 6  ?  Cardiac catheterization - evidence of mid % occlusion, proximal LAD 50% stenosis and focal mid LAD 50% stenosis   · ECHO with EF 60%, moderate to severe concentric hypertrophy, severe aortic stenosis  · Continue with heparin/aspirin/beta-blocker and statin  · For possible CABG/AVR this admission

## 2020-08-18 NOTE — SOCIAL WORK
Pt will undergo open-heart surgery procedure and is recommended to have in-home post-op skilled nursing care via an [place] [address] agency for his aftercare plan  CM met w/ pt to discuss this recommendation  Pt is agreeable w/ recommendation  CM provided pt w/ freedom of choice for [place] [address] agencies  Pt requested referral to [place] [address]  CM made Ecin referral to [facility]  CM to follow

## 2020-08-18 NOTE — PROGRESS NOTES
Consultation - Cardiothoracic Surgery   Ene Browning 72 y o  male MRN: [id number]  Unit/Bed#: [facility] 419-01 Encounter: [id number]    Physician Requesting Consult: Gerry Moon MD    Reason for Consult / Principal Problem: MV CAD/ Severe AS/ Moderate AI    Inpatient consult to Cardiothoracic Surgery  Consult performed by: Ryan Carmichael PA-C  Consult ordered by: Aurelio Hameed MD        History of Present Illness: Ene Browning is a 72y o  year old male with a PMH of mild HTN and HLD (not requiring medications) who presented to [facility] for the evaluation of substernal back pain and heartburn with radiation to the neck and back  He admits to intermittent HERRMANN over the past month, as well as what he believed was mild heatburn  Upon arrival tot he ER, he was severely hypertensive with /100  His troponins peaked at 13 16 and EKG demonstrated ST-T wave changes suggestive of lateral ischemia  He was diagnosed with an acute NSTEMI, underwent cardiac cath which revealed CAD  An echocardiogram demonstrated severe AS, moderate AI  He was transferred to [facility] for cardiac surgical evaluation  The patient denies a history of heart murmur, PND, orthopnea, palpitations, syncope, fevers or chills  He works as an occupational therapist at [employer]  He quit smoking in 1975, but will occasionally use snuff tobacco  He denies alcohol or drug use  He lives at home with his family and helps care for his wheelchair bound wife with Multiple Sclerosis  He denies any family history of cardiac disease  He does not obtain routine dental care  Past Medical History:  Past Medical History:   Diagnosis Date    HLD (hyperlipidemia)     HTN (hypertension)          Past Surgical History:   Past Surgical History:   Procedure Laterality Date    KNEE ARTHROSCOPY Left     ROTATOR CUFF REPAIR Left          Family History:  History reviewed  No pertinent family history        Social History:  Social History     Substance and Sexual Activity   Alcohol Use Not Currently     Social History     Substance and Sexual Activity   Drug Use Not Currently     Social History     Tobacco Use   Smoking Status Never Smoker   Smokeless Tobacco Current User    Types: Snuff     Marital Status: /Civil Union      Home Medications:   Prior to Admission medications    Not on File       Inpatient Medications:  Scheduled Meds:   Current Facility-Administered Medications   Medication Dose Route Frequency Provider Last Rate    acetaminophen  650 mg Oral Q4H PRN Misti Fu MD      aspirin  81 mg Oral Daily Misti Fu MD      atorvastatin  40 mg Oral QPM Misti Fu MD      calcium carbonate  1,000 mg Oral Daily PRN Misti Fu MD      diphenhydrAMINE  25 mg Intravenous Q6H PRN Mohit Bates PA-C      famotidine  20 mg Oral BID Misti Fu MD      heparin (porcine)  3-20 Units/kg/hr (Order-Specific) Intravenous Titrated Misti Fu MD 13 8 Units/kg/hr (08/17/20 2202)    loratadine  10 mg Oral Daily Misti Fu MD      metoprolol tartrate  25 mg Oral Q12H [facility] Misti Fu MD      nitroglycerin  0 4 mg Sublingual Q5 Min PRN Misti Fu MD      ondansetron  4 mg Intravenous Q6H PRN Misti Fu MD      pantoprazole  40 mg Intravenous Q24H [facility] Misti Fu MD      simethicone  80 mg Oral 4x Daily PRN Misti Fu MD      sodium chloride (PF)  3 mL Intravenous Q1H PRN Misti Fu MD      sodium chloride (PF)  3 mL Intravenous Q1H PRN Misti Fu MD       Continuous Infusions: heparin (porcine), 3-20 Units/kg/hr (Order-Specific), Last Rate: 13 8 Units/kg/hr (08/17/20 2202)      PRN Meds:  acetaminophen, 650 mg, Q4H PRN  calcium carbonate, 1,000 mg, Daily PRN  diphenhydrAMINE, 25 mg, Q6H PRN  nitroglycerin, 0 4 mg, Q5 Min PRN  ondansetron, 4 mg, Q6H PRN  simethicone, 80 mg, 4x Daily PRN  sodium chloride (PF), 3 mL, Q1H PRN  sodium chloride (PF), 3 mL, Q1H PRN        Allergies:   Allergies   Allergen Reactions    Contrast Dye [Iodinated Diagnostic Agents] Anaphylaxis       Review of Systems:  Review of Systems   Constitutional: Negative  Negative for chills, diaphoresis, fatigue and fever  HENT: Negative  Eyes: Negative  Respiratory: Positive for shortness of breath  Negative for chest tightness  Cardiovascular: Positive for chest pain  Negative for palpitations and leg swelling  Gastrointestinal: Negative  Endocrine: Negative  Genitourinary: Negative  Musculoskeletal: Negative  Skin: Negative  Allergic/Immunologic: Negative  Neurological: Negative  Negative for syncope  Hematological: Negative for adenopathy  Does not bruise/bleed easily  Psychiatric/Behavioral: Negative  All other systems reviewed and are negative  Vital Signs:     Vitals:    08/17/20 2201 08/17/20 2253 08/18/20 0314 08/18/20 0700   BP: 134/69 158/73 170/77 153/80   BP Location:  Left arm Left arm Left arm   Pulse: 67 67 58 60   Resp:  18 18 18   Temp:  97 7 °F (36 5 °C) 97 9 °F (36 6 °C) 97 6 °F (36 4 °C)   TempSrc:  Oral Oral Oral   SpO2:  95% 92% 94%   Weight:       Height:         Invasive Devices     Peripheral Intravenous Line            Peripheral IV 08/15/20 Left Antecubital 2 days                Physical Exam:  Physical Exam  Vitals signs and nursing note reviewed  Constitutional:       General: He is not in acute distress  Appearance: He is well-developed  He is not diaphoretic  HENT:      Head: Normocephalic and atraumatic  Right Ear: External ear normal       Left Ear: External ear normal       Nose: Nose normal       Mouth/Throat:      Pharynx: No oropharyngeal exudate  Eyes:      General: No scleral icterus  Right eye: No discharge  Left eye: No discharge  Conjunctiva/sclera: Conjunctivae normal       Pupils: Pupils are equal, round, and reactive to light  Neck:      Musculoskeletal: Normal range of motion and neck supple  Vascular: No JVD  Trachea: No tracheal deviation  Cardiovascular:      Rate and Rhythm: Normal rate and regular rhythm  Heart sounds: Normal heart sounds  No murmur  No friction rub  Pulmonary:      Effort: Pulmonary effort is normal  No respiratory distress  Breath sounds: Normal breath sounds  No stridor  No wheezing or rales  Abdominal:      General: Bowel sounds are normal  There is no distension  Palpations: Abdomen is soft  There is no mass  Tenderness: There is no abdominal tenderness  There is no guarding  Musculoskeletal: Normal range of motion  General: No tenderness or deformity  Skin:     General: Skin is warm and dry  Coloration: Skin is not pale  Findings: No erythema or rash  Neurological:      Mental Status: He is alert and oriented to person, place, and time  Cranial Nerves: No cranial nerve deficit  Sensory: No sensory deficit  Motor: No abnormal muscle tone  Coordination: Coordination normal       Deep Tendon Reflexes: Reflexes normal    Psychiatric:         Behavior: Behavior normal          Thought Content: Thought content normal          Judgment: Judgment normal          Lab Results:     Results from last 7 days   Lab Units 08/18/20  0530 08/17/20  0453 08/15/20  1453   WBC Thousand/uL 13 81* 19 16* 10 90*   HEMOGLOBIN g/dL 13 8 14 4 15 4   HEMATOCRIT % 42 0 43 9 48 0   PLATELETS Thousands/uL 188 230 234     Results from last 7 days   Lab Units 08/18/20  0554 08/17/20  0453 08/15/20  1453   POTASSIUM mmol/L 3 8 4 2 4 6   CHLORIDE mmol/L 109* 106 103   CO2 mmol/L 27 26 29   BUN mg/dL 20 22 10   CREATININE mg/dL 0 89 1 12 1 07   CALCIUM mg/dL 8 7 9 0 10 1     Results from last 7 days   Lab Units 08/18/20  0551 08/17/20  2123 08/17/20  0700  08/16/20  0011   INR   --  0 99  --   --  1 11   PTT seconds 70* 24 56*   < > 80*    < > = values in this interval not displayed       No results found for: HGBA1C  Lab Results   Component Value Date TROPONINI 13 16 (H) 08/16/2020     Cholesterol 205, Trig 285, HDL 39,     Imaging Studies:     Cardiac Catheterization: The coronary circulation is right dominant  LAD: The vessel was large sized  Proximal LAD 50% stenosis(seen in LE/Sudanese CAU view) s/p negative iFR 0 96  Mid LAD has focal 50% stenosis at the origin of D2 s/p positive iFR 0 88 with brisk flow distally  D2 small vessel with proximal 50% stenosis  Circumflex: The vessel was large sized  Mid % occluded  OM2 fills from right to left collaterals  OM1 is medium caliber long vessel with luminal irreguilarities  RCA: The vessel was normal sized  Angiography showed mild atherosclerosis  Right to left collateral visualized filling OM    Echocardiogram: LEFT VENTRICLE:  Systolic function was normal  Ejection fraction was estimated to be 60 %  There were no regional wall motion abnormalities  There was moderate-severe concentric hypertrophy      RIGHT VENTRICLE:  The size was normal   Systolic function was normal      MITRAL VALVE:  There was mild annular calcification  There was mild stenosis  There was mild to moderate regurgitation      AORTIC VALVE:  The valve was not visualized well enough to rule out a bicuspid morphology  Leaflets exhibited increased thickness and calcification with reduced cuspal separation  Transaortic velocity and gradient were increased due to stenosis as well as concomitant increased transaortic flow  There was severe stenosis  Peak and mean AV gradients were 102 and 57 mm Hg respectively  KAUR by the continuity equation method was 0 6 sq cm  There was moderate regurgitation      TRICUSPID VALVE:  There was trace regurgitation  Estimated peak PA pressure was 44 mmHg      PULMONIC VALVE:  There was trace regurgitation  CTA C/A/P: 1  No evidence for acute pulmonary embolism or other acute intrathoracic abnormality    2   Mild atherosclerotic disease of the thoracic and abdominal aorta with no evidence for aneurysmal dilatation, vascular stenosis or occlusion, or dissection  3   7 mm left thyroid nodule which does not necessitate additional workup  4   Extensive aortic valve calcification should be clinically correlated  5   Additional findings as noted  PCXR: No acute cardiopulmonary disease  I have personally reviewed pertinent reports  and I have personally reviewed pertinent films in PACS    Assessment:  Principal Problem:    NSTEMI (non-ST elevated myocardial infarction) ([facility] [address] )  Active Problems:    Accelerated hypertension    Elevated brain natriuretic peptide (BNP) level    Leukocytosis    Aortic stenosis    HTN (hypertension)    HLD (hyperlipidemia)    Severe coronary artery disease; Ongoing CABG workup  Severe aortic stenosis; Ongoing AVR workup    Plan:  Risks and benefits of aortic valve replacement and coronary artery bypass grafting were discussed in detail today with the patient  They understand and wish to proceed with further workup and ultimately surgical intervention  We have ordered routine preoperative laboratory and vascular studies  Pending the results of these tests, they will be scheduled for surgery with SIMONE Harrison was comfortable with our recommendations, and their questions were answered to their satisfaction  We will continue to evaluate the patient daily with further recommendations as work up is completed  Thank you for allowing us to participate in the care of this patient       SIGNATURE: Rowena Manriquez PA-C  DATE: August 18, 2020  TIME: 9:33 AM

## 2020-08-18 NOTE — ASSESSMENT & PLAN NOTE
· Noted to have severe aortic stenosis on the echocardiogram  · Monitor  · Cardiology evaluation/CT surgery evaluation

## 2020-08-18 NOTE — PROGRESS NOTES
Progress Note - Cori Putnam 1955, 72 y o  male MRN: [id number]    Unit/Bed#: [facility] 419-01 Encounter: [id number]    Primary Care Provider: Driss Sarmiento MD   Date and time admitted to hospital: 8/17/2020  8:18 PM        * NSTEMI (non-ST elevated myocardial infarction) [facility])  Assessment & Plan  · Patient came to the hospital with substernal chest pain  Troponin elevated-last troponin is 13 6  ? Cardiac catheterization - evidence of mid % occlusion, proximal LAD 50% stenosis and focal mid LAD 50% stenosis   · ECHO with EF 60%, moderate to severe concentric hypertrophy, severe aortic stenosis  · Continue with heparin/aspirin/beta-blocker and statin  · For possible CABG/AVR this admission    CAD (coronary artery disease)  Assessment & Plan  As above  Continue aspirin, Lipitor, metoprolol, heparin drip    Aortic stenosis  Assessment & Plan  · Noted to have severe aortic stenosis on the echocardiogram  · Monitor  · Cardiology evaluation/CT surgery evaluation    Accelerated hypertension  Assessment & Plan  · At the time of presentation to the [facility] blood pressure was about 200    Patient was not any antihypertensive before coming to the hospital and metoprolol was added  · Blood pressure appears to be much better controlled  · Monitor for now    HLD (hyperlipidemia)  Assessment & Plan  Continue atorvastatin    HTN (hypertension)  Assessment & Plan  BP control improved, continue metoprolol    Leukocytosis  Assessment & Plan  · Likely reactive due to NSTEMI  · Improved  · Monitor    Elevated brain natriuretic peptide (BNP) level  Assessment & Plan  · Appears to be euvolemic on examination  · Echocardiogram reviewed-normal ejection fraction  · Moderate to severe concentric hypertrophy likely secondary to uncontrolled blood pressure        VTE Pharmacologic Prophylaxis:   Pharmacologic: Heparin Drip  Mechanical VTE Prophylaxis in Place: Yes    Patient Centered Rounds: I have performed bedside rounds with nursing staff today  Education and Discussions with Family / Patient:  Patient, plan of care    Time Spent for Care: 20 minutes  More than 50% of total time spent on counseling and coordination of care as described above  Current Length of Stay: 1 day(s)    Current Patient Status: Inpatient   Certification Statement: The patient will continue to require additional inpatient hospital stay due to CABG    Discharge Plan: To be determined    Code Status: Level 1 - Full Code      Subjective:   Denies any chest pain, shortness of breath  Objective:     Vitals:   Temp (24hrs), Av 9 °F (36 6 °C), Min:97 6 °F (36 4 °C), Max:98 3 °F (36 8 °C)    Temp:  [97 6 °F (36 4 °C)-98 3 °F (36 8 °C)] 98 3 °F (36 8 °C)  HR:  [58-77] 60  Resp:  [18] 18  BP: (121-170)/(66-92) 121/92  SpO2:  [92 %-96 %] 96 %  Body mass index is 32 41 kg/m²  Input and Output Summary (last 24 hours): Intake/Output Summary (Last 24 hours) at 2020 1616  Last data filed at 2020 1411  Gross per 24 hour   Intake 1294 ml   Output 400 ml   Net 894 ml       Physical Exam:     Physical Exam  Constitutional:       Appearance: Normal appearance  HENT:      Head: Normocephalic and atraumatic  Mouth/Throat:      Mouth: Mucous membranes are moist       Pharynx: Oropharynx is clear  Eyes:      Extraocular Movements: Extraocular movements intact  Pupils: Pupils are equal, round, and reactive to light  Neck:      Musculoskeletal: Normal range of motion and neck supple  Cardiovascular:      Rate and Rhythm: Normal rate and regular rhythm  Pulmonary:      Breath sounds: No wheezing or rales  Abdominal:      General: Abdomen is flat  Palpations: Abdomen is soft  Neurological:      General: No focal deficit present  Mental Status: He is alert and oriented to person, place, and time     Psychiatric:         Mood and Affect: Mood normal          Behavior: Behavior normal            Additional Data: Labs:    Results from last 7 days   Lab Units 08/18/20  0530   WBC Thousand/uL 13 81*   HEMOGLOBIN g/dL 13 8   HEMATOCRIT % 42 0   PLATELETS Thousands/uL 188   NEUTROS PCT % 79*   LYMPHS PCT % 12*   MONOS PCT % 8   EOS PCT % 0     Results from last 7 days   Lab Units 08/18/20  0554  08/15/20  1453   SODIUM mmol/L 141   < > 142   POTASSIUM mmol/L 3 8   < > 4 6   CHLORIDE mmol/L 109*   < > 103   CO2 mmol/L 27   < > 29   BUN mg/dL 20   < > 10   CREATININE mg/dL 0 89   < > 1 07   ANION GAP mmol/L 5   < > 10   CALCIUM mg/dL 8 7   < > 10 1   ALBUMIN g/dL  --   --  4 3   TOTAL BILIRUBIN mg/dL  --   --  0 50   ALK PHOS U/L  --   --  79   ALT U/L  --   --  29   AST U/L  --   --  20   GLUCOSE RANDOM mg/dL 86   < > 113    < > = values in this interval not displayed  Results from last 7 days   Lab Units 08/17/20  2123   INR  0 99                       * I Have Reviewed All Lab Data Listed Above  * Additional Pertinent Lab Tests Reviewed:  All Labs Within Last 24 Hours Reviewed      Recent Cultures (last 7 days):           Last 24 Hours Medication List:   Current Facility-Administered Medications   Medication Dose Route Frequency Provider Last Rate    acetaminophen  650 mg Oral Q4H PRN Padmini Prasad MD      aspirin  81 mg Oral Daily Padmini Prasad MD      atorvastatin  40 mg Oral QPM Padmini Prasad MD      calcium carbonate  1,000 mg Oral Daily PRN Padmini Prasad MD      chlorhexidine  15 mL Mouth/Throat Q12H [address] [place], [place]      diphenhydrAMINE  25 mg Intravenous Q6H PRN Michael Ferraro PA-C      famotidine  20 mg Oral BID Padmini Prasad MD      heparin (porcine)  3-20 Units/kg/hr (Order-Specific) Intravenous Titrated Padmini Prasad MD 11 8 Units/kg/hr (08/18/20 1413)    loratadine  10 mg Oral Daily Padmini Prasad MD      metoprolol tartrate  25 mg Oral Q12H [address] Padmini Prasad MD      mupirocin  1 application Nasal P87G [address] [place], [place]      nitroglycerin  0 4 mg Sublingual Q5 Min PRN Carole Hoffmann MD      ondansetron  4 mg Intravenous Q6H PRN Carole Hoffmann MD      [START ON 8/19/2020] pantoprazole  40 mg Oral Early Morning Evangelist Lerma MD      simethicone  80 mg Oral 4x Daily PRN Carole Hoffmann MD      sodium chloride (PF)  3 mL Intravenous Q1H PRN Carole Hoffmann MD      sodium chloride (PF)  3 mL Intravenous Q1H PRN Carole Hoffmann MD          Today, Patient Was Seen By: Brissa Celis MD    ** Please Note: Dictation voice to text software may have been used in the creation of this document   **

## 2020-08-18 NOTE — PLAN OF CARE
Problem: PAIN - ADULT  Goal: Verbalizes/displays adequate comfort level or baseline comfort level  Description: Interventions:  - Encourage patient to monitor pain and request assistance  - Assess pain using appropriate pain scale  - Administer analgesics based on type and severity of pain and evaluate response  - Implement non-pharmacological measures as appropriate and evaluate response  - Consider cultural and social influences on pain and pain management  - Notify physician/advanced practitioner if interventions unsuccessful or patient reports new pain  Outcome: Progressing     Problem: INFECTION - ADULT  Goal: Absence or prevention of progression during hospitalization  Description: INTERVENTIONS:  - Assess and monitor for signs and symptoms of infection  - Monitor lab/diagnostic results  - Monitor all insertion sites, i e  indwelling lines, tubes, and drains  - Monitor endotracheal if appropriate and nasal secretions for changes in amount and color  - [place] appropriate cooling/warming therapies per order  - Administer medications as ordered  - Instruct and encourage patient and family to use good hand hygiene technique  - Identify and instruct in appropriate isolation precautions for identified infection/condition  Outcome: Progressing     Problem: SAFETY ADULT  Goal: Patient will remain free of falls  Description: INTERVENTIONS:  - Assess patient frequently for physical needs  -  Identify cognitive and physical deficits and behaviors that affect risk of falls    -  [place] fall precautions as indicated by assessment   - Educate patient/family on patient safety including physical limitations  - Instruct patient to call for assistance with activity based on assessment  - Modify environment to reduce risk of injury  - Consider OT/PT consult to assist with strengthening/mobility  Outcome: Progressing  Goal: Maintain or return to baseline ADL function  Description: INTERVENTIONS:  -  Assess patient's ability to carry out ADLs; assess patient's baseline for ADL function and identify physical deficits which impact ability to perform ADLs (bathing, care of mouth/teeth, toileting, grooming, dressing, etc )  - Assess/evaluate cause of self-care deficits   - Assess range of motion  - Assess patient's mobility; develop plan if impaired  - Assess patient's need for assistive devices and provide as appropriate  - Encourage maximum independence but intervene and supervise when necessary  - Involve family in performance of ADLs  - Assess for home care needs following discharge   - Consider OT consult to assist with ADL evaluation and planning for discharge  - Provide patient education as appropriate  Outcome: Progressing  Goal: Maintain or return mobility status to optimal level  Description: INTERVENTIONS:  - Assess patient's baseline mobility status (ambulation, transfers, stairs, etc )    - Identify cognitive and physical deficits and behaviors that affect mobility  - Identify mobility aids required to assist with transfers and/or ambulation (gait belt, sit-to-stand, lift, walker, cane, etc )  - Webb fall precautions as indicated by assessment  - Record patient progress and toleration of activity level on Mobility SBAR; progress patient to next Phase/Stage  - Instruct patient to call for assistance with activity based on assessment  - Consider rehabilitation consult to assist with strengthening/weightbearing, etc   Outcome: Progressing     Problem: DISCHARGE PLANNING  Goal: Discharge to home or other facility with appropriate resources  Description: INTERVENTIONS:  - Identify barriers to discharge w/patient and caregiver  - Arrange for needed discharge resources and transportation as appropriate  - Identify discharge learning needs (meds, wound care, etc )  - Arrange for interpretive services to assist at discharge as needed  - Refer to Case Management Department for coordinating discharge planning if the patient needs post-hospital services based on physician/advanced practitioner order or complex needs related to functional status, cognitive ability, or social support system  Outcome: Progressing     Problem: Knowledge Deficit  Goal: Patient/family/caregiver demonstrates understanding of disease process, treatment plan, medications, and discharge instructions  Description: Complete learning assessment and assess knowledge base    Interventions:  - Provide teaching at level of understanding  - Provide teaching via preferred learning methods  Outcome: Progressing

## 2020-08-19 ENCOUNTER — ANESTHESIA (INPATIENT)
Dept: PERIOP | Facility: HOSPITAL | Age: 65
DRG: 219 | End: 2020-08-19
Payer: COMMERCIAL

## 2020-08-19 ENCOUNTER — APPOINTMENT (INPATIENT)
Dept: NON INVASIVE DIAGNOSTICS | Facility: HOSPITAL | Age: 65
DRG: 219 | End: 2020-08-19
Attending: THORACIC SURGERY (CARDIOTHORACIC VASCULAR SURGERY)
Payer: COMMERCIAL

## 2020-08-19 ENCOUNTER — APPOINTMENT (INPATIENT)
Dept: RADIOLOGY | Facility: HOSPITAL | Age: 65
DRG: 219 | End: 2020-08-19
Payer: COMMERCIAL

## 2020-08-19 ENCOUNTER — ANESTHESIA EVENT (INPATIENT)
Dept: PERIOP | Facility: HOSPITAL | Age: 65
DRG: 219 | End: 2020-08-19
Payer: COMMERCIAL

## 2020-08-19 PROBLEM — Z95.2 S/P AVR (AORTIC VALVE REPLACEMENT): Chronic | Status: ACTIVE | Noted: 2020-08-19

## 2020-08-19 PROBLEM — I25.10 CAD (CORONARY ARTERY DISEASE): Chronic | Status: ACTIVE | Noted: 2020-08-18

## 2020-08-19 PROBLEM — Z95.2 S/P AVR (AORTIC VALVE REPLACEMENT): Status: ACTIVE | Noted: 2020-08-19

## 2020-08-19 PROBLEM — Z95.1 S/P CABG (CORONARY ARTERY BYPASS GRAFT): Status: ACTIVE | Noted: 2020-08-19

## 2020-08-19 PROBLEM — I35.0 AORTIC STENOSIS: Chronic | Status: ACTIVE | Noted: 2020-08-17

## 2020-08-19 LAB
ABO GROUP BLD BPU: NORMAL
ANION GAP SERPL CALCULATED.3IONS-SCNC: 10 MMOL/L (ref 4–13)
ANION GAP SERPL CALCULATED.3IONS-SCNC: 6 MMOL/L (ref 4–13)
APTT PPP: 56 SECONDS (ref 23–37)
ARTERIAL PATENCY WRIST A: ABNORMAL
ATRIAL RATE: 54 BPM
BASE EXCESS BLDA CALC-SCNC: -1 MMOL/L (ref -2–3)
BASE EXCESS BLDA CALC-SCNC: -1 MMOL/L (ref -2–3)
BASE EXCESS BLDA CALC-SCNC: -2 MMOL/L (ref -2–3)
BASE EXCESS BLDA CALC-SCNC: -3 MMOL/L (ref -2–3)
BASE EXCESS BLDA CALC-SCNC: 0 MMOL/L (ref -2–3)
BPU ID: NORMAL
BUN SERPL-MCNC: 13 MG/DL (ref 5–25)
BUN SERPL-MCNC: 15 MG/DL (ref 5–25)
CA-I BLD-SCNC: 0.86 MMOL/L (ref 1.12–1.32)
CA-I BLD-SCNC: 0.9 MMOL/L (ref 1.12–1.32)
CA-I BLD-SCNC: 0.95 MMOL/L (ref 1.12–1.32)
CA-I BLD-SCNC: 0.95 MMOL/L (ref 1.12–1.32)
CA-I BLD-SCNC: 1.07 MMOL/L (ref 1.12–1.32)
CA-I BLD-SCNC: 1.09 MMOL/L (ref 1.12–1.32)
CA-I BLD-SCNC: 1.14 MMOL/L (ref 1.12–1.32)
CA-I BLD-SCNC: 1.17 MMOL/L (ref 1.12–1.32)
CALCIUM SERPL-MCNC: 7.3 MG/DL (ref 8.3–10.1)
CALCIUM SERPL-MCNC: 8.5 MG/DL (ref 8.3–10.1)
CHLORIDE SERPL-SCNC: 106 MMOL/L (ref 100–108)
CHLORIDE SERPL-SCNC: 110 MMOL/L (ref 100–108)
CO2 SERPL-SCNC: 22 MMOL/L (ref 21–32)
CO2 SERPL-SCNC: 28 MMOL/L (ref 21–32)
CREAT SERPL-MCNC: 0.81 MG/DL (ref 0.6–1.3)
CREAT SERPL-MCNC: 1.15 MG/DL (ref 0.6–1.3)
CROSSMATCH: NORMAL
ERYTHROCYTE [DISTWIDTH] IN BLOOD BY AUTOMATED COUNT: 13.5 % (ref 11.6–15.1)
EST. AVERAGE GLUCOSE BLD GHB EST-MCNC: 111 MG/DL
FIO2 GAS DIL.REBREATH: 60 L
GFR SERPL CREATININE-BSD FRML MDRD: 66 ML/MIN/1.73SQ M
GFR SERPL CREATININE-BSD FRML MDRD: 93 ML/MIN/1.73SQ M
GLUCOSE SERPL-MCNC: 100 MG/DL (ref 65–140)
GLUCOSE SERPL-MCNC: 105 MG/DL (ref 65–140)
GLUCOSE SERPL-MCNC: 107 MG/DL (ref 65–140)
GLUCOSE SERPL-MCNC: 107 MG/DL (ref 65–140)
GLUCOSE SERPL-MCNC: 110 MG/DL (ref 65–140)
GLUCOSE SERPL-MCNC: 114 MG/DL (ref 65–140)
GLUCOSE SERPL-MCNC: 114 MG/DL (ref 65–140)
GLUCOSE SERPL-MCNC: 116 MG/DL (ref 65–140)
GLUCOSE SERPL-MCNC: 122 MG/DL (ref 65–140)
GLUCOSE SERPL-MCNC: 126 MG/DL (ref 65–140)
GLUCOSE SERPL-MCNC: 128 MG/DL (ref 65–140)
GLUCOSE SERPL-MCNC: 130 MG/DL (ref 65–140)
GLUCOSE SERPL-MCNC: 89 MG/DL (ref 65–140)
GLUCOSE SERPL-MCNC: 98 MG/DL (ref 65–140)
HBA1C MFR BLD: 5.5 %
HCO3 BLDA-SCNC: 21.6 MMOL/L (ref 22–28)
HCO3 BLDA-SCNC: 23.8 MMOL/L (ref 24–30)
HCO3 BLDA-SCNC: 24.1 MMOL/L (ref 22–28)
HCO3 BLDA-SCNC: 24.1 MMOL/L (ref 22–28)
HCO3 BLDA-SCNC: 24.6 MMOL/L (ref 24–30)
HCO3 BLDA-SCNC: 24.9 MMOL/L (ref 22–28)
HCO3 BLDA-SCNC: 25 MMOL/L (ref 22–28)
HCO3 BLDA-SCNC: 25.3 MMOL/L (ref 22–28)
HCT VFR BLD AUTO: 39.1 % (ref 36.5–49.3)
HCT VFR BLD AUTO: 48 % (ref 36.5–49.3)
HCT VFR BLD CALC: 24 % (ref 36.5–49.3)
HCT VFR BLD CALC: 26 % (ref 36.5–49.3)
HCT VFR BLD CALC: 27 % (ref 36.5–49.3)
HCT VFR BLD CALC: 27 % (ref 36.5–49.3)
HCT VFR BLD CALC: 28 % (ref 36.5–49.3)
HCT VFR BLD CALC: 34 % (ref 36.5–49.3)
HCT VFR BLD CALC: 36 % (ref 36.5–49.3)
HCT VFR BLD CALC: 37 % (ref 36.5–49.3)
HGB BLD-MCNC: 13 G/DL (ref 12–17)
HGB BLD-MCNC: 15.2 G/DL (ref 12–17)
HGB BLDA-MCNC: 11.6 G/DL (ref 12–17)
HGB BLDA-MCNC: 12.2 G/DL (ref 12–17)
HGB BLDA-MCNC: 12.6 G/DL (ref 12–17)
HGB BLDA-MCNC: 8.2 G/DL (ref 12–17)
HGB BLDA-MCNC: 8.8 G/DL (ref 12–17)
HGB BLDA-MCNC: 9.2 G/DL (ref 12–17)
HGB BLDA-MCNC: 9.2 G/DL (ref 12–17)
HGB BLDA-MCNC: 9.5 G/DL (ref 12–17)
KCT BLD-ACNC: 115 SEC (ref 89–137)
KCT BLD-ACNC: 132 SEC (ref 89–137)
KCT BLD-ACNC: 478 SEC (ref 89–137)
KCT BLD-ACNC: 531 SEC (ref 89–137)
KCT BLD-ACNC: 554 SEC (ref 89–137)
KCT BLD-ACNC: 571 SEC (ref 89–137)
MCH RBC QN AUTO: 28 PG (ref 26.8–34.3)
MCHC RBC AUTO-ENTMCNC: 31.7 G/DL (ref 31.4–37.4)
MCV RBC AUTO: 88 FL (ref 82–98)
MRSA NOSE QL CULT: NORMAL
P AXIS: 50 DEGREES
PCO2 BLD: 23 MMOL/L (ref 21–32)
PCO2 BLD: 25 MMOL/L (ref 21–32)
PCO2 BLD: 26 MMOL/L (ref 21–32)
PCO2 BLD: 27 MMOL/L (ref 21–32)
PCO2 BLD: 35.6 MM HG (ref 36–44)
PCO2 BLD: 36.3 MM HG (ref 36–44)
PCO2 BLD: 38.2 MM HG (ref 42–50)
PCO2 BLD: 38.8 MM HG (ref 36–44)
PCO2 BLD: 40.8 MM HG (ref 36–44)
PCO2 BLD: 41.5 MM HG (ref 36–44)
PCO2 BLD: 42.5 MM HG (ref 36–44)
PCO2 BLD: 46.1 MM HG (ref 42–50)
PH BLD: 7.33 [PH] (ref 7.3–7.4)
PH BLD: 7.37 [PH] (ref 7.35–7.45)
PH BLD: 7.38 [PH] (ref 7.35–7.45)
PH BLD: 7.38 [PH] (ref 7.35–7.45)
PH BLD: 7.4 [PH] (ref 7.35–7.45)
PH BLD: 7.4 [PH] (ref 7.3–7.4)
PH BLD: 7.42 [PH] (ref 7.35–7.45)
PH BLD: 7.44 [PH] (ref 7.35–7.45)
PLATELET # BLD AUTO: 112 THOUSANDS/UL (ref 149–390)
PLATELET # BLD AUTO: 134 THOUSANDS/UL (ref 149–390)
PLATELET # BLD AUTO: 200 THOUSANDS/UL (ref 149–390)
PMV BLD AUTO: 12.3 FL (ref 8.9–12.7)
PMV BLD AUTO: 12.5 FL (ref 8.9–12.7)
PMV BLD AUTO: 12.5 FL (ref 8.9–12.7)
PO2 BLD: 101 MM HG (ref 75–129)
PO2 BLD: 261 MM HG (ref 75–129)
PO2 BLD: 286 MM HG (ref 75–129)
PO2 BLD: 352 MM HG (ref 75–129)
PO2 BLD: 373 MM HG (ref 75–129)
PO2 BLD: 41 MM HG (ref 35–45)
PO2 BLD: 42 MM HG (ref 35–45)
PO2 BLD: >400 MM HG (ref 75–129)
POTASSIUM BLD-SCNC: 3.9 MMOL/L (ref 3.5–5.3)
POTASSIUM BLD-SCNC: 4.1 MMOL/L (ref 3.5–5.3)
POTASSIUM BLD-SCNC: 4.2 MMOL/L (ref 3.5–5.3)
POTASSIUM BLD-SCNC: 4.7 MMOL/L (ref 3.5–5.3)
POTASSIUM BLD-SCNC: 4.9 MMOL/L (ref 3.5–5.3)
POTASSIUM BLD-SCNC: 5 MMOL/L (ref 3.5–5.3)
POTASSIUM BLD-SCNC: 5 MMOL/L (ref 3.5–5.3)
POTASSIUM BLD-SCNC: 5.7 MMOL/L (ref 3.5–5.3)
POTASSIUM SERPL-SCNC: 3.7 MMOL/L (ref 3.5–5.3)
POTASSIUM SERPL-SCNC: 4 MMOL/L (ref 3.5–5.3)
POTASSIUM SERPL-SCNC: 4.1 MMOL/L (ref 3.5–5.3)
PR INTERVAL: 163 MS
QRS AXIS: 122 DEGREES
QRSD INTERVAL: 108 MS
QT INTERVAL: 479 MS
QTC INTERVAL: 454 MS
RBC # BLD AUTO: 5.43 MILLION/UL (ref 3.88–5.62)
SAMPLE SITE: ABNORMAL
SAO2 % BLD FROM PO2: 100 % (ref 60–85)
SAO2 % BLD FROM PO2: 73 % (ref 60–85)
SAO2 % BLD FROM PO2: 78 % (ref 60–85)
SAO2 % BLD FROM PO2: 98 % (ref 60–85)
SODIUM BLD-SCNC: 134 MMOL/L (ref 136–145)
SODIUM BLD-SCNC: 136 MMOL/L (ref 136–145)
SODIUM BLD-SCNC: 136 MMOL/L (ref 136–145)
SODIUM BLD-SCNC: 137 MMOL/L (ref 136–145)
SODIUM BLD-SCNC: 137 MMOL/L (ref 136–145)
SODIUM BLD-SCNC: 139 MMOL/L (ref 136–145)
SODIUM BLD-SCNC: 139 MMOL/L (ref 136–145)
SODIUM BLD-SCNC: 140 MMOL/L (ref 136–145)
SODIUM SERPL-SCNC: 140 MMOL/L (ref 136–145)
SODIUM SERPL-SCNC: 142 MMOL/L (ref 136–145)
SPECIMEN SOURCE: ABNORMAL
SPECIMEN SOURCE: NORMAL
T WAVE AXIS: 59 DEGREES
UNIT DISPENSE STATUS: NORMAL
UNIT PRODUCT CODE: NORMAL
UNIT RH: NORMAL
VENTRICULAR RATE: 54 BPM
WBC # BLD AUTO: 11.3 THOUSAND/UL (ref 4.31–10.16)

## 2020-08-19 PROCEDURE — 94002 VENT MGMT INPAT INIT DAY: CPT

## 2020-08-19 PROCEDURE — 82330 ASSAY OF CALCIUM: CPT

## 2020-08-19 PROCEDURE — 84132 ASSAY OF SERUM POTASSIUM: CPT | Performed by: PHYSICIAN ASSISTANT

## 2020-08-19 PROCEDURE — 82803 BLOOD GASES ANY COMBINATION: CPT

## 2020-08-19 PROCEDURE — 88313 SPECIAL STAINS GROUP 2: CPT | Performed by: PATHOLOGY

## 2020-08-19 PROCEDURE — 85049 AUTOMATED PLATELET COUNT: CPT | Performed by: THORACIC SURGERY (CARDIOTHORACIC VASCULAR SURGERY)

## 2020-08-19 PROCEDURE — 99223 1ST HOSP IP/OBS HIGH 75: CPT | Performed by: EMERGENCY MEDICINE

## 2020-08-19 PROCEDURE — 33405 REPLACEMENT AORTIC VALVE OPN: CPT | Performed by: THORACIC SURGERY (CARDIOTHORACIC VASCULAR SURGERY)

## 2020-08-19 PROCEDURE — 84295 ASSAY OF SERUM SODIUM: CPT

## 2020-08-19 PROCEDURE — 021009W BYPASS CORONARY ARTERY, ONE ARTERY FROM AORTA WITH AUTOLOGOUS VENOUS TISSUE, OPEN APPROACH: ICD-10-PCS | Performed by: THORACIC SURGERY (CARDIOTHORACIC VASCULAR SURGERY)

## 2020-08-19 PROCEDURE — 85347 COAGULATION TIME ACTIVATED: CPT

## 2020-08-19 PROCEDURE — 99232 SBSQ HOSP IP/OBS MODERATE 35: CPT | Performed by: INTERNAL MEDICINE

## 2020-08-19 PROCEDURE — 33508 ENDOSCOPIC VEIN HARVEST: CPT | Performed by: THORACIC SURGERY (CARDIOTHORACIC VASCULAR SURGERY)

## 2020-08-19 PROCEDURE — 30233N0 TRANSFUSION OF AUTOLOGOUS RED BLOOD CELLS INTO PERIPHERAL VEIN, PERCUTANEOUS APPROACH: ICD-10-PCS | Performed by: THORACIC SURGERY (CARDIOTHORACIC VASCULAR SURGERY)

## 2020-08-19 PROCEDURE — 5A1223Z PERFORMANCE OF CARDIAC PACING, CONTINUOUS: ICD-10-PCS | Performed by: THORACIC SURGERY (CARDIOTHORACIC VASCULAR SURGERY)

## 2020-08-19 PROCEDURE — 02RF08Z REPLACEMENT OF AORTIC VALVE WITH ZOOPLASTIC TISSUE, OPEN APPROACH: ICD-10-PCS | Performed by: THORACIC SURGERY (CARDIOTHORACIC VASCULAR SURGERY)

## 2020-08-19 PROCEDURE — 80048 BASIC METABOLIC PNL TOTAL CA: CPT | Performed by: PHYSICIAN ASSISTANT

## 2020-08-19 PROCEDURE — 84132 ASSAY OF SERUM POTASSIUM: CPT

## 2020-08-19 PROCEDURE — 94760 N-INVAS EAR/PLS OXIMETRY 1: CPT

## 2020-08-19 PROCEDURE — 85730 THROMBOPLASTIN TIME PARTIAL: CPT | Performed by: INTERNAL MEDICINE

## 2020-08-19 PROCEDURE — 82948 REAGENT STRIP/BLOOD GLUCOSE: CPT

## 2020-08-19 PROCEDURE — 85014 HEMATOCRIT: CPT

## 2020-08-19 PROCEDURE — 82947 ASSAY GLUCOSE BLOOD QUANT: CPT

## 2020-08-19 PROCEDURE — 88311 DECALCIFY TISSUE: CPT | Performed by: PATHOLOGY

## 2020-08-19 PROCEDURE — 85014 HEMATOCRIT: CPT | Performed by: PHYSICIAN ASSISTANT

## 2020-08-19 PROCEDURE — 71045 X-RAY EXAM CHEST 1 VIEW: CPT

## 2020-08-19 PROCEDURE — 83036 HEMOGLOBIN GLYCOSYLATED A1C: CPT | Performed by: PHYSICIAN ASSISTANT

## 2020-08-19 PROCEDURE — 80048 BASIC METABOLIC PNL TOTAL CA: CPT | Performed by: INTERNAL MEDICINE

## 2020-08-19 PROCEDURE — 06BQ4ZZ EXCISION OF LEFT SAPHENOUS VEIN, PERCUTANEOUS ENDOSCOPIC APPROACH: ICD-10-PCS | Performed by: THORACIC SURGERY (CARDIOTHORACIC VASCULAR SURGERY)

## 2020-08-19 PROCEDURE — 33405 REPLACEMENT AORTIC VALVE OPN: CPT | Performed by: PHYSICIAN ASSISTANT

## 2020-08-19 PROCEDURE — 85027 COMPLETE CBC AUTOMATED: CPT | Performed by: INTERNAL MEDICINE

## 2020-08-19 PROCEDURE — 88305 TISSUE EXAM BY PATHOLOGIST: CPT | Performed by: PATHOLOGY

## 2020-08-19 PROCEDURE — 93010 ELECTROCARDIOGRAM REPORT: CPT | Performed by: INTERNAL MEDICINE

## 2020-08-19 PROCEDURE — 33510 CABG VEIN SINGLE: CPT | Performed by: PHYSICIAN ASSISTANT

## 2020-08-19 PROCEDURE — 5A1221Z PERFORMANCE OF CARDIAC OUTPUT, CONTINUOUS: ICD-10-PCS | Performed by: THORACIC SURGERY (CARDIOTHORACIC VASCULAR SURGERY)

## 2020-08-19 PROCEDURE — 93005 ELECTROCARDIOGRAM TRACING: CPT

## 2020-08-19 PROCEDURE — 33510 CABG VEIN SINGLE: CPT | Performed by: THORACIC SURGERY (CARDIOTHORACIC VASCULAR SURGERY)

## 2020-08-19 PROCEDURE — 85049 AUTOMATED PLATELET COUNT: CPT | Performed by: PHYSICIAN ASSISTANT

## 2020-08-19 PROCEDURE — 02HV33Z INSERTION OF INFUSION DEVICE INTO SUPERIOR VENA CAVA, PERCUTANEOUS APPROACH: ICD-10-PCS | Performed by: ANESTHESIOLOGY

## 2020-08-19 PROCEDURE — 93312 ECHO TRANSESOPHAGEAL: CPT

## 2020-08-19 PROCEDURE — 33508 ENDOSCOPIC VEIN HARVEST: CPT | Performed by: PHYSICIAN ASSISTANT

## 2020-08-19 PROCEDURE — 85018 HEMOGLOBIN: CPT | Performed by: PHYSICIAN ASSISTANT

## 2020-08-19 DEVICE — VALVE AORTIC INSPIRIS RESILIA 21MM: Type: IMPLANTABLE DEVICE | Site: HEART | Status: FUNCTIONAL

## 2020-08-19 DEVICE — MARKER CORONARY BYPASS VOSS GRAFT: Type: IMPLANTABLE DEVICE | Site: AORTA | Status: FUNCTIONAL

## 2020-08-19 RX ORDER — NEOSTIGMINE METHYLSULFATE 1 MG/ML
INJECTION INTRAVENOUS AS NEEDED
Status: DISCONTINUED | OUTPATIENT
Start: 2020-08-19 | End: 2020-08-19

## 2020-08-19 RX ORDER — ALBUMIN, HUMAN INJ 5% 5 %
12.5 SOLUTION INTRAVENOUS ONCE
Status: COMPLETED | OUTPATIENT
Start: 2020-08-19 | End: 2020-08-19

## 2020-08-19 RX ORDER — ONDANSETRON 2 MG/ML
INJECTION INTRAMUSCULAR; INTRAVENOUS AS NEEDED
Status: DISCONTINUED | OUTPATIENT
Start: 2020-08-19 | End: 2020-08-19

## 2020-08-19 RX ORDER — OXYCODONE HYDROCHLORIDE 5 MG/1
5 TABLET ORAL EVERY 4 HOURS PRN
Status: DISCONTINUED | OUTPATIENT
Start: 2020-08-19 | End: 2020-08-22 | Stop reason: HOSPADM

## 2020-08-19 RX ORDER — CEFAZOLIN SODIUM 2 G/50ML
2000 SOLUTION INTRAVENOUS ONCE
Status: COMPLETED | OUTPATIENT
Start: 2020-08-19 | End: 2020-08-19

## 2020-08-19 RX ORDER — POTASSIUM CHLORIDE 14.9 MG/ML
20 INJECTION INTRAVENOUS
Status: DISCONTINUED | OUTPATIENT
Start: 2020-08-19 | End: 2020-08-20

## 2020-08-19 RX ORDER — ACETAMINOPHEN 325 MG/1
975 TABLET ORAL EVERY 8 HOURS
Status: DISCONTINUED | OUTPATIENT
Start: 2020-08-19 | End: 2020-08-22 | Stop reason: HOSPADM

## 2020-08-19 RX ORDER — SODIUM CHLORIDE 9 MG/ML
INJECTION, SOLUTION INTRAVENOUS CONTINUOUS PRN
Status: DISCONTINUED | OUTPATIENT
Start: 2020-08-19 | End: 2020-08-19

## 2020-08-19 RX ORDER — MAGNESIUM SULFATE HEPTAHYDRATE 40 MG/ML
2 INJECTION, SOLUTION INTRAVENOUS ONCE
Status: COMPLETED | OUTPATIENT
Start: 2020-08-19 | End: 2020-08-20

## 2020-08-19 RX ORDER — FENTANYL CITRATE 50 UG/ML
50 INJECTION, SOLUTION INTRAMUSCULAR; INTRAVENOUS ONCE
Status: DISCONTINUED | OUTPATIENT
Start: 2020-08-19 | End: 2020-08-20

## 2020-08-19 RX ORDER — GLYCOPYRROLATE 0.2 MG/ML
INJECTION INTRAMUSCULAR; INTRAVENOUS AS NEEDED
Status: DISCONTINUED | OUTPATIENT
Start: 2020-08-19 | End: 2020-08-19

## 2020-08-19 RX ORDER — FUROSEMIDE 10 MG/ML
40 INJECTION INTRAMUSCULAR; INTRAVENOUS EVERY 6 HOURS PRN
Status: DISCONTINUED | OUTPATIENT
Start: 2020-08-19 | End: 2020-08-20

## 2020-08-19 RX ORDER — AMINOCAPROIC ACID 250 MG/ML
INJECTION, SOLUTION INTRAVENOUS AS NEEDED
Status: DISCONTINUED | OUTPATIENT
Start: 2020-08-19 | End: 2020-08-19

## 2020-08-19 RX ORDER — HEPARIN SODIUM 1000 [USP'U]/ML
INJECTION, SOLUTION INTRAVENOUS; SUBCUTANEOUS AS NEEDED
Status: DISCONTINUED | OUTPATIENT
Start: 2020-08-19 | End: 2020-08-19

## 2020-08-19 RX ORDER — CEFAZOLIN SODIUM 1 G/50ML
1000 SOLUTION INTRAVENOUS EVERY 8 HOURS
Status: COMPLETED | OUTPATIENT
Start: 2020-08-20 | End: 2020-08-21

## 2020-08-19 RX ORDER — ATORVASTATIN CALCIUM 80 MG/1
80 TABLET, FILM COATED ORAL
Status: DISCONTINUED | OUTPATIENT
Start: 2020-08-19 | End: 2020-08-22 | Stop reason: HOSPADM

## 2020-08-19 RX ORDER — POTASSIUM CHLORIDE 14.9 MG/ML
20 INJECTION INTRAVENOUS ONCE AS NEEDED
Status: DISCONTINUED | OUTPATIENT
Start: 2020-08-19 | End: 2020-08-20

## 2020-08-19 RX ORDER — SODIUM CHLORIDE 450 MG/100ML
20 INJECTION, SOLUTION INTRAVENOUS CONTINUOUS
Status: DISCONTINUED | OUTPATIENT
Start: 2020-08-19 | End: 2020-08-20

## 2020-08-19 RX ORDER — FENTANYL CITRATE 50 UG/ML
50 INJECTION, SOLUTION INTRAMUSCULAR; INTRAVENOUS
Status: DISCONTINUED | OUTPATIENT
Start: 2020-08-19 | End: 2020-08-20

## 2020-08-19 RX ORDER — ASPIRIN 325 MG
325 TABLET ORAL DAILY
Status: DISCONTINUED | OUTPATIENT
Start: 2020-08-20 | End: 2020-08-22 | Stop reason: HOSPADM

## 2020-08-19 RX ORDER — ACETAMINOPHEN 650 MG/1
650 SUPPOSITORY RECTAL EVERY 4 HOURS PRN
Status: DISCONTINUED | OUTPATIENT
Start: 2020-08-19 | End: 2020-08-20

## 2020-08-19 RX ORDER — BISACODYL 10 MG
10 SUPPOSITORY, RECTAL RECTAL DAILY PRN
Status: DISCONTINUED | OUTPATIENT
Start: 2020-08-19 | End: 2020-08-22 | Stop reason: HOSPADM

## 2020-08-19 RX ORDER — CHLORHEXIDINE GLUCONATE 0.12 MG/ML
15 RINSE ORAL 2 TIMES DAILY
Status: DISCONTINUED | OUTPATIENT
Start: 2020-08-19 | End: 2020-08-22 | Stop reason: HOSPADM

## 2020-08-19 RX ORDER — PROPOFOL 10 MG/ML
INJECTION, EMULSION INTRAVENOUS AS NEEDED
Status: DISCONTINUED | OUTPATIENT
Start: 2020-08-19 | End: 2020-08-19

## 2020-08-19 RX ORDER — MIDAZOLAM HYDROCHLORIDE 2 MG/2ML
INJECTION, SOLUTION INTRAMUSCULAR; INTRAVENOUS AS NEEDED
Status: DISCONTINUED | OUTPATIENT
Start: 2020-08-19 | End: 2020-08-19

## 2020-08-19 RX ORDER — VANCOMYCIN HYDROCHLORIDE 1 G/20ML
INJECTION, POWDER, LYOPHILIZED, FOR SOLUTION INTRAVENOUS AS NEEDED
Status: DISCONTINUED | OUTPATIENT
Start: 2020-08-19 | End: 2020-08-19 | Stop reason: HOSPADM

## 2020-08-19 RX ORDER — FENTANYL CITRATE 50 UG/ML
INJECTION, SOLUTION INTRAMUSCULAR; INTRAVENOUS AS NEEDED
Status: DISCONTINUED | OUTPATIENT
Start: 2020-08-19 | End: 2020-08-19

## 2020-08-19 RX ORDER — CALCIUM CHLORIDE 100 MG/ML
1 INJECTION INTRAVENOUS; INTRAVENTRICULAR ONCE
Status: DISCONTINUED | OUTPATIENT
Start: 2020-08-19 | End: 2020-08-20

## 2020-08-19 RX ORDER — FONDAPARINUX SODIUM 2.5 MG/.5ML
2.5 INJECTION SUBCUTANEOUS DAILY
Status: DISCONTINUED | OUTPATIENT
Start: 2020-08-20 | End: 2020-08-22 | Stop reason: HOSPADM

## 2020-08-19 RX ORDER — ONDANSETRON 2 MG/ML
4 INJECTION INTRAMUSCULAR; INTRAVENOUS EVERY 6 HOURS PRN
Status: DISCONTINUED | OUTPATIENT
Start: 2020-08-19 | End: 2020-08-22 | Stop reason: HOSPADM

## 2020-08-19 RX ORDER — ROCURONIUM BROMIDE 10 MG/ML
INJECTION, SOLUTION INTRAVENOUS AS NEEDED
Status: DISCONTINUED | OUTPATIENT
Start: 2020-08-19 | End: 2020-08-19

## 2020-08-19 RX ORDER — POLYETHYLENE GLYCOL 3350 17 G/17G
17 POWDER, FOR SOLUTION ORAL DAILY
Status: DISCONTINUED | OUTPATIENT
Start: 2020-08-20 | End: 2020-08-22 | Stop reason: HOSPADM

## 2020-08-19 RX ORDER — OXYCODONE HYDROCHLORIDE 5 MG/1
2.5 TABLET ORAL EVERY 4 HOURS PRN
Status: DISCONTINUED | OUTPATIENT
Start: 2020-08-19 | End: 2020-08-22 | Stop reason: HOSPADM

## 2020-08-19 RX ORDER — PANTOPRAZOLE SODIUM 40 MG/1
40 TABLET, DELAYED RELEASE ORAL DAILY
Status: DISCONTINUED | OUTPATIENT
Start: 2020-08-20 | End: 2020-08-22 | Stop reason: HOSPADM

## 2020-08-19 RX ADMIN — SODIUM CHLORIDE: 0.9 INJECTION, SOLUTION INTRAVENOUS at 12:33

## 2020-08-19 RX ADMIN — NEOSTIGMINE METHYLSULFATE 3 MG: 1 INJECTION, SOLUTION INTRAVENOUS at 16:16

## 2020-08-19 RX ADMIN — CHLORHEXIDINE GLUCONATE 0.12% ORAL RINSE 15 ML: 1.2 LIQUID ORAL at 08:56

## 2020-08-19 RX ADMIN — SODIUM CHLORIDE, SODIUM LACTATE, POTASSIUM CHLORIDE, AND CALCIUM CHLORIDE 500 ML: .6; .31; .03; .02 INJECTION, SOLUTION INTRAVENOUS at 17:17

## 2020-08-19 RX ADMIN — ONDANSETRON 4 MG: 2 INJECTION INTRAMUSCULAR; INTRAVENOUS at 16:16

## 2020-08-19 RX ADMIN — CEFAZOLIN SODIUM 2000 MG: 2 SOLUTION INTRAVENOUS at 13:17

## 2020-08-19 RX ADMIN — ASPIRIN 81 MG 81 MG: 81 TABLET ORAL at 08:53

## 2020-08-19 RX ADMIN — ACETAMINOPHEN 975 MG: 325 TABLET, FILM COATED ORAL at 19:43

## 2020-08-19 RX ADMIN — SODIUM CHLORIDE 6 MG/HR: 0.9 INJECTION, SOLUTION INTRAVENOUS at 17:00

## 2020-08-19 RX ADMIN — CEFAZOLIN SODIUM 2000 MG: 2 SOLUTION INTRAVENOUS at 16:02

## 2020-08-19 RX ADMIN — HEPARIN SODIUM 32300 UNITS: 1000 INJECTION INTRAVENOUS; SUBCUTANEOUS at 13:53

## 2020-08-19 RX ADMIN — ALBUMIN (HUMAN) 12.5 G: 12.5 INJECTION, SOLUTION INTRAVENOUS at 19:43

## 2020-08-19 RX ADMIN — MIDAZOLAM 2 MG: 1 INJECTION INTRAMUSCULAR; INTRAVENOUS at 12:28

## 2020-08-19 RX ADMIN — CHLORHEXIDINE GLUCONATE 0.12% ORAL RINSE 15 ML: 1.2 LIQUID ORAL at 06:40

## 2020-08-19 RX ADMIN — AMINOCAPROIC ACID 5 G: 250 INJECTION, SOLUTION INTRAVENOUS at 13:13

## 2020-08-19 RX ADMIN — PROPOFOL 80 MG: 10 INJECTION, EMULSION INTRAVENOUS at 12:41

## 2020-08-19 RX ADMIN — FENTANYL CITRATE 150 MCG: 50 INJECTION, SOLUTION INTRAMUSCULAR; INTRAVENOUS at 12:41

## 2020-08-19 RX ADMIN — LORATADINE 10 MG: 10 TABLET ORAL at 08:54

## 2020-08-19 RX ADMIN — METOPROLOL TARTRATE 12.5 MG: 25 TABLET, FILM COATED ORAL at 08:53

## 2020-08-19 RX ADMIN — SODIUM CHLORIDE 20 ML/HR: 0.45 INJECTION, SOLUTION INTRAVENOUS at 17:16

## 2020-08-19 RX ADMIN — MUPIROCIN 1 APPLICATION: 20 OINTMENT TOPICAL at 08:56

## 2020-08-19 RX ADMIN — SODIUM CHLORIDE 7 MG/HR: 0.9 INJECTION, SOLUTION INTRAVENOUS at 22:26

## 2020-08-19 RX ADMIN — GLYCOPYRROLATE 0.8 MG: 0.2 INJECTION, SOLUTION INTRAMUSCULAR; INTRAVENOUS at 16:16

## 2020-08-19 RX ADMIN — MAGNESIUM SULFATE HEPTAHYDRATE 2 G: 40 INJECTION, SOLUTION INTRAVENOUS at 17:17

## 2020-08-19 RX ADMIN — ROCURONIUM BROMIDE 100 MG: 10 INJECTION, SOLUTION INTRAVENOUS at 12:41

## 2020-08-19 RX ADMIN — AMINOCAPROIC ACID 1000 MG/HR: 250 INJECTION, SOLUTION INTRAVENOUS at 13:13

## 2020-08-19 RX ADMIN — MIDAZOLAM 2 MG: 1 INJECTION INTRAMUSCULAR; INTRAVENOUS at 14:09

## 2020-08-19 RX ADMIN — MUPIROCIN 1 APPLICATION: 20 OINTMENT TOPICAL at 06:39

## 2020-08-19 RX ADMIN — PHENYLEPHRINE HYDROCHLORIDE 50 MCG: 10 INJECTION INTRAVENOUS at 12:41

## 2020-08-19 RX ADMIN — FAMOTIDINE 20 MG: 20 TABLET ORAL at 08:53

## 2020-08-19 RX ADMIN — METOPROLOL TARTRATE 12.5 MG: 25 TABLET ORAL at 06:38

## 2020-08-19 RX ADMIN — MUPIROCIN 1 APPLICATION: 20 OINTMENT TOPICAL at 21:14

## 2020-08-19 RX ADMIN — PANTOPRAZOLE SODIUM 40 MG: 40 TABLET, DELAYED RELEASE ORAL at 06:38

## 2020-08-19 RX ADMIN — FENTANYL CITRATE 250 MCG: 50 INJECTION, SOLUTION INTRAMUSCULAR; INTRAVENOUS at 14:09

## 2020-08-19 NOTE — ANESTHESIA PREPROCEDURE EVALUATION
Procedure:  CORONARY ARTERY BYPASS GRAFT (CABG) WITH REPLACEMENT VALVE AORTIC (AVR); EVH (N/A Chest)    Relevant Problems   CARDIO   (+) Accelerated hypertension   (+) Aortic stenosis   (+) CAD (coronary artery disease)   (+) Chest pain   (+) HLD (hyperlipidemia)   (+) HTN (hypertension)   (+) NSTEMI (non-ST elevated myocardial infarction) (HCC)   (+) S/P AVR (aortic valve replacement)   (+) S/P CABG (coronary artery bypass graft)      Lab Results:             Results from last 7 days   Lab Units 08/18/20  0530 08/17/20  0453 08/15/20  1453   WBC Thousand/uL 13 81* 19 16* 10 90*   HEMOGLOBIN g/dL 13 8 14 4 15 4   HEMATOCRIT % 42 0 43 9 48 0   PLATELETS Thousands/uL 188 230 234            Results from last 7 days   Lab Units 08/18/20  0554 08/17/20  0453 08/15/20  1453   POTASSIUM mmol/L 3 8 4 2 4 6   CHLORIDE mmol/L 109* 106 103   CO2 mmol/L 27 26 29   BUN mg/dL 20 22 10   CREATININE mg/dL 0 89 1 12 1 07   CALCIUM mg/dL 8 7 9 0 10 1              Results from last 7 days   Lab Units 08/18/20  0551 08/17/20  2123 08/17/20  0700   08/16/20  0011   INR    --  0 99  --   --  1 11   PTT seconds 70* 24 56*   < > 80*    < > = values in this interval not displayed  No results found for: HGBA1C        Lab Results   Component Value Date     TROPONINI 13 16 (H) 08/16/2020     Cholesterol 205, Trig 285, HDL 39,      Imaging Studies:      Cardiac Catheterization: The coronary circulation is right dominant  LAD: The vessel was large sized  Proximal LAD 50% stenosis(seen in LE/[place]izen [place] CAU view) s/p negative iFR 0 96  Mid LAD has focal 50% stenosis at the origin of D2 s/p positive iFR 0 88 with brisk flow distally  D2 small vessel with proximal 50% stenosis  Circumflex: The vessel was large sized  Mid % occluded  OM2 fills from right to left collaterals  OM1 is medium caliber long vessel with luminal irreguilarities  RCA: The vessel was normal sized  Angiography showed mild atherosclerosis    Right to left collateral visualized filling OM     Echocardiogram: LEFT VENTRICLE:  Systolic function was normal  Ejection fraction was estimated to be 60 %  There were no regional wall motion abnormalities  There was moderate-severe concentric hypertrophy      RIGHT VENTRICLE:  The size was normal   Systolic function was normal      MITRAL VALVE:  There was mild annular calcification  There was mild stenosis  There was mild to moderate regurgitation      AORTIC VALVE:  The valve was not visualized well enough to rule out a bicuspid morphology  Leaflets exhibited increased thickness and calcification with reduced cuspal separation  Transaortic velocity and gradient were increased due to stenosis as well as concomitant increased transaortic flow  There was severe stenosis  Peak and mean AV gradients were 102 and 57 mm Hg respectively  KAUR by the continuity equation method was 0 6 sq cm  There was moderate regurgitation      TRICUSPID VALVE:  There was trace regurgitation  Estimated peak PA pressure was 44 mmHg      PULMONIC VALVE:  There was trace regurgitation  Physical Exam    Airway    Mallampati score: I         Dental   upper dentures,     Cardiovascular      Pulmonary      Other Findings        Anesthesia Plan  ASA Score- 4     Anesthesia Type- general with ASA Monitors  Additional Monitors: central venous line, pulmonary artery catheter and arterial line  Airway Plan: ETT  Comment: Aliene Lennox, M D , have personally seen and evaluated the patient prior to anesthetic care  I have reviewed the pre-anesthetic record, and other medical records if appropriate to the anesthetic care  If a CRNA is involved in the case, I have reviewed the CRNA assessment, if present, and agree  Risks/benefits and alternatives discussed with patient including possible PONV, sore throat, and possibility of rare anesthetic and surgical emergencies      Patient denies history of dysphagia, diverticula, esophageal dysmotility, strictures, stents, or varices  Preinduction ABP; post-induction TLC and PAC; PHOEBE and ICU post-operatively          Plan Factors-Exercise tolerance (METS): >4 METS  Chart reviewed  Imaging results reviewed  Existing labs reviewed  Patient summary reviewed  Induction- intravenous  Postoperative Plan-     Informed Consent- Anesthetic plan and risks discussed with patient  I personally reviewed this patient with the CRNA  Discussed and agreed on the Anesthesia Plan with the CRNA  Prieto Lyon

## 2020-08-19 NOTE — RESPIRATORY THERAPY NOTE
RT Protocol Note  Gabbi Belcher 72 y o  male MRN: [id number]  Unit/Bed#: [facility] 415-01 Encounter: [id number]    Assessment    Principal Problem:    NSTEMI (non-ST elevated myocardial infarction) [facility])  Active Problems:     Aortic stenosis    HTN (hypertension)    HLD (hyperlipidemia)    CAD (coronary artery disease)    S/P CABG (coronary artery bypass graft)    S/P AVR (aortic valve replacement)      Home Pulmonary Medications:  na       Past Medical History:   Diagnosis Date    HLD (hyperlipidemia)     HTN (hypertension)      Social History     Socioeconomic History    Marital status: /Civil Union     Spouse name: None    Number of children: None    Years of education: None    Highest education level: None   Occupational History    Occupation: occupational therapist     Employer: [address]   Social Needs    Financial resource strain: None    Food insecurity     Worry: None     Inability: None    Transportation needs     Medical: None     Non-medical: None   Tobacco Use    Smoking status: Never Smoker    Smokeless tobacco: Current User     Types: Snuff   Substance and Sexual Activity    Alcohol use: Not Currently    Drug use: Not Currently    Sexual activity: Not Currently   Lifestyle    Physical activity     Days per week: None     Minutes per session: None    Stress: None   Relationships    Social connections     Talks on phone: None     Gets together: None     Attends Qu[employer] service: None     Active member of club or organization: None     Attends meetings of clubs or organizations: None     Relationship status: None    Intimate partner violence     Fear of current or ex partner: None     Emotionally abused: None     Physically abused: None     Forced sexual activity: None   Other Topics Concern    None   Social History Narrative    None       Subjective         Objective    Physical Exam:   Assessment Type: (P) Assess only  General Appearance: (P) Eyes do not open to any stimulus  Respiratory Pattern: (P) Normal  Chest Assessment: (P) Chest expansion symmetrical  Bilateral Breath Sounds: (P) Clear, Diminished  O2 Device: (P) vent    Vitals:  Blood pressure 139/60, pulse 58, temperature 98 4 °F (36 9 °C), temperature source Probe, resp  rate 19, height 5' 3" (1 6 m), weight 83 kg (182 lb 15 7 oz), SpO2 98 %  Results from last 7 days   Lab Units 08/19/20  1707   IFTIKHAR TEST  Postive Iftikhar Test       Imaging and other studies: I have personally reviewed pertinent reports        O2 Device: (P) vent     Plan    Respiratory Plan: (P) No distress/Pulmonary history, Discontinue Protocol  Airway Clearance Plan: (P) Incentive Spirometer     Resp Comments: (P) d/c'd resp protocol, breath sounds clear and diminished, no resp hx, no resp meds at home, cxr shows clear lungs, no indication for udn/mdi therapy

## 2020-08-19 NOTE — CONSULTS
[address] [place] [address] 72 y o  male MRN: [id number]  Unit/Bed#: [facility] 415-01 Encounter: [id number]    Inpatient consult to Markshahriarsree Rubio Yelena performed by: Hyacinth Dwyer PA-C  Consult ordered by: Toan Pérez PA-C        Physician Requesting Consult: Rome Grajeda MD    Reason for Consult / Principal Problem: NSTEMI (non-ST elevated myocardial infarction) [facility])    HPI: Cherylene Ley is a 35-year-old male who presented to [facility] Emergency Department with complaints of substernal pain and heartburn with radiation to his neck  He also admits to intermittent dyspnea on exertion over the last 1 month  Upon arrival to the emergency department he was diagnosed with an NSTEMI was evaluated by Cardiology  He underwent cardiac catheterization which revealed multivessel CAD and the patient was transferred to [facility] for cardiac surgical evaluation  Upon evaluation with echocardiogram, it was also diagnosed the patient has severe aortic stenosis  He was seen in consultation by Dr Demetria Lee who recommended proceeding with CABG revascularization and aortic valve replacement  The patient presents to the ICU postoperatively today  PMH:  Hypertension hyperlipidemia    History obtained from chart review due to patient being intubated and sedated  ---------------------------------------------------------------------------------------------------------------------------------------------------------------------  Impressions:  1  CAD s/p CABG x1  2  Aortic stenosis s/p AVR  3  HTN  4  HLD    Plan:    Neuro: D/C continuous sedation  Oxycodone PRN for pain  Trend neuro exam   Delirium precautions  CV: MAP goal >65  SBP goal <130  CI>2 2  Post-op medications: None  Volume resuscitation as needed  Monitor rhythm on telemetry  Epicardial pacing wires in place for pacing as needed  Intra-op PHOEBE not performed  Lung: Check STAT post-op ABG and CXR   Wean vent with spontaneous breathing trial with goal to extubate today  GI: GI prophylaxis with PPI  Bowel regimen  Zofran PRN for nausea  FEN: NPO  Replenish K >4 0, mag >2 0 and calcium >7 0  : Check STAT post-op BMP  [place] in place  Monitor UOP with goal >0 5cc/kg/hour  Lasix versus volume resuscitate as needed depending on hemodynamics and volume status  ID: Prophylactic post-op abx  Maintain normothermia  Trend temps  Heme: Check STAT post-op H/H and platelets  Monitor incision site, invasive lines, and chest tube outputs for bleeding  Send coag panel if needed  Endo: Insulin gtt for blood sugar control  Results from last 6 Months   Lab Units 08/19/20  0722   HEMOGLOBIN A1C % 5 5     Disposition: ICU Care   ---------------------------------------------------------------------------------------------------------------------------------------------------------------------  Historical Information   Past Medical History:   Diagnosis Date    HLD (hyperlipidemia)     HTN (hypertension)      Past Surgical History:   Procedure Laterality Date    KNEE ARTHROSCOPY Left     ROTATOR CUFF REPAIR Left      Social History   Social History     Substance and Sexual Activity   Alcohol Use Not Currently     Social History     Substance and Sexual Activity   Drug Use Not Currently     Social History     Tobacco Use   Smoking Status Never Smoker   Smokeless Tobacco Current User    Types: Snuff     History reviewed  No pertinent family history  I have reviewed this patient's family history and commented on sigificant items within the HPI    ROS: ROS unable to be obtained due to patient being intubated and sedated  Allergies:    Allergies   Allergen Reactions    Contrast Dye [Iodinated Diagnostic Agents] Anaphylaxis       Home Medications:   Prior to Admission medications    Not on File     Inpatient Medications:  Scheduled Meds:  Current Facility-Administered Medications   Medication Dose Route Frequency Provider Last Rate    [MAR Hold] acetaminophen  650 mg Oral Q4H PRN Junito Justin MD      [facility] Hold] aspirin  81 mg Oral Daily Junito Justin MD      [facility] Hold] atorvastatin  40 mg Oral QPM Junito Justin MD      [facility] Hold] calcium carbonate  1,000 mg Oral Daily PRN Junito Justin MD      [facility] Hold] diphenhydrAMINE  25 mg Intravenous Q6H PRN Juliane Watt PA-C      [facility] Hold] famotidine  20 mg Oral BID Junito Justin MD      [facility] Hold] loratadine  10 mg Oral Daily Junito Justin MD      [facility] Hold] metoprolol tartrate  25 mg Oral Q12H [facility] Junito Justin MD      [facility] Hold] nitroglycerin  0 4 mg Sublingual Q5 Min PRN Junito Justin MD      [facility] Hold] ondansetron  4 mg Intravenous Q6H PRN Junito Justin MD      [facility] Hold] pantoprazole  40 mg Oral Early Morning Thad Reynolds MD      [facility] Hold] simethicone  80 mg Oral 4x Daily PRN Junito Justin MD      [facility] Hold] sodium chloride (PF)  3 mL Intravenous Q1H PRN Junito Justin MD      [facility] Hold] sodium chloride (PF)  3 mL Intravenous Q1H PRN Junito Justin MD       Continuous Infusions:   PRN Meds:  [MAR Hold] acetaminophen, 650 mg, Q4H PRN  [MAR Hold] calcium carbonate, 1,000 mg, Daily PRN  [MAR Hold] diphenhydrAMINE, 25 mg, Q6H PRN  [MAR Hold] nitroglycerin, 0 4 mg, Q5 Min PRN  [MAR Hold] ondansetron, 4 mg, Q6H PRN  [MAR Hold] simethicone, 80 mg, 4x Daily PRN  [MAR Hold] sodium chloride (PF), 3 mL, Q1H PRN  [MAR Hold] sodium chloride (PF), 3 mL, Q1H PRN      ---------------------------------------------------------------------------------------------------------------------------------------------------------------------  Vitals:   Vitals:    08/19/20 0300 08/19/20 0700 08/19/20 1100 08/19/20 1649   BP: 119/62 148/71 139/60    BP Location: Right arm Right arm Right arm    Pulse: 69 65 56    Resp: 20 18 18    Temp: 98 °F (36 7 °C) 98 2 °F (36 8 °C) 98 2 °F (36 8 °C)    TempSrc: Oral Oral Oral    SpO2: 97% 98% 96% 100%   Weight:       Height: Arterial Line:          Temperature: Temp (24hrs), Av 3 °F (36 8 °C), Min:98 °F (36 7 °C), Max:98 9 °F (37 2 °C)  Current: Temperature: 98 2 °F (36 8 °C)    Weights: IBW: 56 9 kg  Body mass index is 32 41 kg/m²  Hemodynamic Monitoring:  PAP:  , CVP:  , CO:  , CI:  , SVR:      Ventilator Settings:  Respiratory    Lab Data (Last 4 hours)    None         O2/Vent Data (Last 4 hours)       1649           Vent Mode AC/VC       Resp Rate (BPM) (BPM) 16       Vt (mL) (mL) 450       FIO2 (%) (%) 60       PEEP (cmH2O) (cmH2O) 5       MV 8 9                 Labs:   Results from last 7 days   Lab Units 20  1559 20  1539 20  1524 20  1519  20  0620 20  0530 20  0453 08/15/20  1453   WBC Thousand/uL  --   --   --   --   --  11 30* 13 81* 19 16* 10 90*   HEMOGLOBIN g/dL  --   --   --   --   --  15 2 13 8 14 4 15 4   I STAT HEMOGLOBIN g/dl 9 2* 8 8* 9 5*  --    < >  --   --   --   --    HEMATOCRIT %  --   --   --   --   --  48 0 42 0 43 9 48 0   HEMATOCRIT, ISTAT % 27* 26* 28*  --    < >  --   --   --   --    PLATELETS Thousands/uL  --   --   --  134*  --  200 188 230 234   NEUTROS PCT %  --   --   --   --   --   --  79*  --  84*   MONOS PCT %  --   --   --   --   --   --  8  --  4    < > = values in this interval not displayed       Results from last 7 days   Lab Units 20  1559 20  1539 20  1524  20  0706 20  0554 20  0453 08/15/20  1453   SODIUM mmol/L  --   --   --   --  140 141 141 142   POTASSIUM mmol/L  --   --   --   --  3 7 3 8 4 2 4 6   CHLORIDE mmol/L  --   --   --   --  106 109* 106 103   CO2 mmol/L  --   --   --   --  28 27 26 29   CO2, I-STAT mmol/L 26 25 27   < >  --   --   --   --    BUN mg/dL  --   --   --   --  15 20 22 10   CREATININE mg/dL  --   --   --   --  1 15 0 89 1 12 1 07   CALCIUM mg/dL  --   --   --   --  8 5 8 7 9 0 10 1   ALK PHOS U/L  --   --   --   --   --   --   --  79   ALT U/L  --   --   --   --   -- --   --  29   AST U/L  --   --   --   --   --   --   --  20   GLUCOSE, ISTAT mg/dl 122 130 126   < >  --   --   --   --     < > = values in this interval not displayed  Post-op AB 38/36/101/21/-3/98%  Post-op CXR: pending  Post-op EKG: NSR  Lateral ST depressions- unchanged  This was personally reviewed by myself  Physical Exam  Vitals signs reviewed  Constitutional:       General: He is not in acute distress  Appearance: He is not ill-appearing  Interventions: He is sedated and intubated  Neck:      Comments: [facility] CVC  Cardiovascular:      Rate and Rhythm: Normal rate and regular rhythm  Heart sounds: No murmur  No friction rub  No gallop  Pulmonary:      Effort: Pulmonary effort is normal  He is intubated  Breath sounds: No decreased breath sounds, wheezing, rhonchi or rales  Chest:      Comments: Midline sternal incision  Skin:     General: Skin is warm and dry  Findings: No rash  Neurological:      Comments: Sedated   Psychiatric:      Comments: Cannot assess  Invasive lines and devices: Invasive Devices     Central Venous Catheter Line            CVC Central Lines 20 Triple less than 1 day    Introducer 20 less than 1 day          Peripheral Intravenous Line            Peripheral IV 08/15/20 Left Antecubital 4 days    Peripheral IV 20 Right Hand less than 1 day          Arterial Line            Arterial Line 20 Left Radial less than 1 day          Line            Pacer Wires less than 1 day    Pacer Wires less than 1 day          Drain            Chest Tube 1 Anterior Mediastinal 32 Fr  less than 1 day    Chest Tube 2 Left Pleural 32 Fr  less than 1 day    Closed/Suction Drain Left Leg Bulb 15 Fr  less than 1 day    Urethral Catheter Non-latex; Temperature probe 16 Fr  less than 1 day          Airway            ETT  Hi-Lo; Cuffed;Oral 8 mm less than 1 day ---------------------------------------------------------------------------------------------------------------------------------------------------------------------  Care Time Delivered:   No Critical Care time spent     SIGNATURE: Malaika Hassan PA-C  DATE: August 19, 2020  TIME: 5:07 PM

## 2020-08-19 NOTE — RESPIRATORY THERAPY NOTE
RT Ventilator Management Note  Cyndi Matthews 72 y o  male MRN: [id number]  Unit/Bed#: [facility] 415-01 Encounter: [id number]      Daily Screen       8/19/2020  1751             Patient safety screen outcome[de-identified]  Passed            Physical Exam:   Assessment Type: Assess only  General Appearance: Eyes do not open to any stimulus  Respiratory Pattern: Normal  Chest Assessment: Chest expansion symmetrical  Bilateral Breath Sounds: Clear, Diminished  O2 Device: vent      Resp Comments: (P) pt awake, following commands, pt placed on a cpap/ps wean, pt appears comfortable will continue to monitor

## 2020-08-19 NOTE — ANESTHESIA PROCEDURE NOTES
Central Line Insertion  Performed by: Susan Burch MD  Authorized by: Susan Burch MD     Date/Time: 8/19/2020 1:04 PM  Catheter Type:  triple lumen  Consent: Written consent obtained  Risks and benefits: risks, benefits and alternatives were discussed  Consent given by: patient  Patient understanding: patient states understanding of the procedure being performed  Patient consent: the patient's understanding of the procedure matches consent given  Patient identity confirmed: verbally with patient and arm band  Location details: right internal jugular  Catheter size: 7 Fr  Patient position: Trendelenburg  Assessment: blood return through all ports and free fluid flow  Preparation: skin prepped with 2% chlorhexidine  Skin prep agent dried: skin prep agent completely dried prior to procedure  Sterile barriers: all five maximum sterile barriers used - cap, mask, sterile gown, sterile gloves, and large sterile sheet  Hand hygiene: hand hygiene performed prior to central venous catheter insertion  Ultrasound guidance: yes  sterile gel and probe cover used in ultrasound-guided central venous catheter insertionNumber of attempts: 1  Successful placement: yes  Post-procedure: line sutured and dressing applied  Patient tolerance: Patient tolerated the procedure well with no immediate complications  Comments: Single skin and vessel puncture  Easy thread of wires  Wires confirmed in SVC with ultrasound  No apparent complications

## 2020-08-19 NOTE — PROGRESS NOTES
Cardiology Progress Note - Cherylene Ley 72 y o  male MRN: [id number]    Unit/Bed#: [facility] 419-01 Encounter: [id number]      Assessment:  Principal Problem:    NSTEMI (non-ST elevated myocardial infarction) ([address] )  Active Problems:    Accelerated hypertension    Elevated brain natriuretic peptide (BNP) level    Leukocytosis    Aortic stenosis    HTN (hypertension)    HLD (hyperlipidemia)    CAD (coronary artery disease)      Plan:  Patient is comfortable this morning  He has no chest pain or significant dyspnea  He is in sinus rhythm on telemetry  He is scheduled for CABG/AVR later this morning  BMP today with potassium of 3 7 and creatinine of 1 15  Will follow postoperatively  Subjective:   Patient seen and examined  No significant events overnight   negative  Objective:     Vitals: Blood pressure 148/71, pulse 65, temperature 98 2 °F (36 8 °C), temperature source Oral, resp  rate 18, height 5' 3" (1 6 m), weight 83 kg (182 lb 15 7 oz), SpO2 98 %  , Body mass index is 32 41 kg/m² ,   Orthostatic Blood Pressures      Most Recent Value   Blood Pressure  148/71 filed at 08/19/2020 0700   Patient Position - Orthostatic VS  Standing filed at 08/19/2020 0700      ,      Intake/Output Summary (Last 24 hours) at 8/19/2020 0914  Last data filed at 8/18/2020 2311  Gross per 24 hour   Intake 1794 ml   Output 1100 ml   Net 694 ml       No significant arrhythmias seen on telemetry review         Physical Exam:    GEN: Cherylene Ley appears well, alert and oriented x 3, pleasant and cooperative   NECK: supple, no carotid bruits, no JVD or HJR  HEART: normal rate, regular rhythm, normal S1 and reduced S2 with systolic murmur heard  LUNGS: clear to auscultation bilaterally; no wheezes, rales, or rhonchi   ABDOMEN: normal bowel sounds, soft, no tenderness, no distention  EXTREMITIES: peripheral pulses normal; no clubbing, cyanosis, or edema  SKIN: warm and well perfused, no suspicious lesions on exposed skin    Labs & Results:    Admission on 08/17/2020   Component Date Value    Cholesterol 08/17/2020 205*    Triglycerides 08/17/2020 285*    HDL, Direct 08/17/2020 39*    LDL Calculated 08/17/2020 109*    PTT 08/17/2020 24     Protime 08/17/2020 13 1     INR 08/17/2020 0 99     PTT 08/18/2020 70*    Sodium 08/18/2020 141     Potassium 08/18/2020 3 8     Chloride 08/18/2020 109*    CO2 08/18/2020 27     ANION GAP 08/18/2020 5     BUN 08/18/2020 20     Creatinine 08/18/2020 0 89     Glucose 08/18/2020 86     Calcium 08/18/2020 8 7     eGFR 08/18/2020 90     WBC 08/18/2020 13 81*    RBC 08/18/2020 4 88     Hemoglobin 08/18/2020 13 8     Hematocrit 08/18/2020 42 0     MCV 08/18/2020 86     MCH 08/18/2020 28 3     MCHC 08/18/2020 32 9     RDW 08/18/2020 13 6     MPV 08/18/2020 12 5     Platelets 38/94/4455 188     nRBC 08/18/2020 0     Neutrophils Relative 08/18/2020 79*    Immat GRANS % 08/18/2020 1     Lymphocytes Relative 08/18/2020 12*    Monocytes Relative 08/18/2020 8     Eosinophils Relative 08/18/2020 0     Basophils Relative 08/18/2020 0     Neutrophils Absolute 08/18/2020 10 96*    Immature Grans Absolute 08/18/2020 0 10     Lymphocytes Absolute 08/18/2020 1 68     Monocytes Absolute 08/18/2020 1 03     Eosinophils Absolute 08/18/2020 0 01     Basophils Absolute 08/18/2020 0 03     PTT 08/18/2020 91*    Color, UA 08/18/2020 Yellow     Clarity, UA 08/18/2020 Clear     Specific Gravity, UA 08/18/2020 1 025     pH, UA 08/18/2020 6 0     Leukocytes, UA 08/18/2020 Negative     Nitrite, UA 08/18/2020 Negative     Protein, UA 08/18/2020 Negative     Glucose, UA 08/18/2020 Negative     Ketones, UA 08/18/2020 Negative     Urobilinogen, UA 08/18/2020 1 0     Bilirubin, UA 08/18/2020 Negative     Blood, UA 08/18/2020 Negative     SARS-CoV-2 08/18/2020 Negative     ABO Grouping 08/18/2020 A     Rh Factor 08/18/2020 Positive     Antibody Screen 08/18/2020 Negative     Specimen Expiration Date 08/18/2020 [id number]     Unit Product Code 08/19/2020 K4811K17     Unit Number 08/19/2020 [id number]     Unit ABO 08/19/2020 A     Unit RH 08/19/2020 POS     Crossmatch 08/19/2020 Compatible     Unit Dispense Status 08/19/2020 Crossmatched     Unit Product Code 08/19/2020 C0792Z99     Unit Number 08/19/2020 [id number]     Unit ABO 08/19/2020 A     Unit RH 08/19/2020 POS     Crossmatch 08/19/2020 Compatible     Unit Dispense Status 08/19/2020 Crossmatched     Unit Product Code 08/19/2020 G1726H95     Unit Number 08/19/2020 [id number]     Unit ABO 08/19/2020 A     Unit RH 08/19/2020 POS     Crossmatch 08/19/2020 Compatible     Unit Dispense Status 08/19/2020 Crossmatched     Unit Product Code 08/19/2020 N9742H94     Unit Number 08/19/2020 [id number]     Unit ABO 08/19/2020 A     Unit RH 08/19/2020 POS     Crossmatch 08/19/2020 Compatible     Unit Dispense Status 08/19/2020 Crossmatched     PTT 08/18/2020 53*    ABO Grouping 08/18/2020 A     Rh Factor 08/18/2020 Positive     PTT 08/19/2020 56*    Hemoglobin A1C 08/19/2020 5 5     EAG 08/19/2020 111     Sodium 08/19/2020 140     Potassium 08/19/2020 3 7     Chloride 08/19/2020 106     CO2 08/19/2020 28     ANION GAP 08/19/2020 6     BUN 08/19/2020 15     Creatinine 08/19/2020 1 15     Glucose 08/19/2020 98     Calcium 08/19/2020 8 5     eGFR 08/19/2020 66     WBC 08/19/2020 11 30*    RBC 08/19/2020 5 43     Hemoglobin 08/19/2020 15 2     Hematocrit 08/19/2020 48 0     MCV 08/19/2020 88     MCH 08/19/2020 28 0     MCHC 08/19/2020 31 7     RDW 08/19/2020 13 5     Platelets 71/83/7803 200     MPV 08/19/2020 12 5        Xr Mandible Panorex ([place] Only)    Result Date: 8/18/2020  Narrative: MANDIBLE - PANOREX INDICATION:   preop AVR  no routine dental care  COMPARISON:  None VIEWS:  XR MANDIBLE PANOREX ([place] ONLY) FINDINGS: No acute fracture  Multiple absent and carious teeth  Limited but lucencies suggestive of the apex of the right maxillary 1st bicuspid  The temporomandibular joints appear grossly intact  Impression: No fracture  Multiple carious teeth  Possible right maxillary 1st bicuspid periapical lucency  Workstation performed: [id number]     X-ray Chest 1 View Portable    Result Date: 8/15/2020  Narrative: CHEST INDICATION:   chest pain  COMPARISON:  12/7/2011 EXAM PERFORMED/VIEWS:  XR CHEST PORTABLE  AP semierect Images: 1 FINDINGS: The heart is top normal in size  The mediastinum and hilar structures are normal  Lungs are hyperinflated but clear  No pleural effusions or pneumothorax  Osseous structures appear within normal limits for patient age  Impression: No acute cardiopulmonary disease  Workstation performed: [id number]     Cta Chest Abdomen Pelvis W Wo Contrast (dissection)    Result Date: 8/15/2020  Narrative: CT ANGIOGRAM OF THE CHEST, ABDOMEN AND PELVIS WITH AND WITHOUT IV CONTRAST INDICATION:  Shortness of breath, chest /back /abdomen pain COMPARISON: Chest radiograph 8/15/2020 TECHNIQUE:  CT angiogram examination of the chest, abdomen and pelvis was performed according to standard protocol  This examination, like all CT scans performed in the [place], was performed utilizing techniques to minimize radiation dose exposure, including the use of iterative reconstruction and automated exposure control  Contrast as well as noncontrast images were obtained  3D reconstructions were performed an independent workstation, and are supplied for review  Rad dose 1825 mGy-cm IV Contrast:  100 mL of iohexol (OMNIPAQUE) Enteric Contrast:  Not given FINDINGS: VASCULAR STRUCTURES:  There is adequate opacification of the pulmonary arterial vasculature with no filling defects identified to suggest acute pulmonary emboli  Pulmonary artery is of normal caliber  RV/LV ratio within normal limits    Adequate opacification of the thoracic and abdominal aorta and branch vessels noted  Vessels are of normal caliber  Classical arch great vessel anatomy is identified with widely patent vessel origins  Atherosclerotic calcification of the aortic valve noted diffusely  Patent single renal arteries noted bilaterally  Patent celiac, superior mesenteric and inferior mesenteric arteries noted  Mild atherosclerotic calcified and noncalcified plaque of the abdominal aorta and branch vessels noted  Mild ectasia  of the infrarenal abdominal aorta is present  No evidence for aortic dissection  OTHER FINDINGS: CHEST: HEART: Top normal size heart  Aortic valve calcification and mild coronary artery calcification present    LUNGS:  Unremarkable  PLEURA: No pleural effusion  MEDIASTINUM AND DELICIA:  No mass or significant lymphadenopathy  CHEST WALL AND LOWER NECK:  7 mm nodule in the mid left thyroid gland  Incidental discovery of one or more thyroid nodule(s) measuring less than 1 5 cm and without suspicious features is noted in this patient who is above 28years old; according to guidelines published in the February 2015 white paper on incidental thyroid nodules in the Journal of the [employer] of Radiology [facility]), no further evaluation is recommended    ABDOMEN LIVER/BILIARY TREE:  Unremarkable  GALLBLADDER:  No calcified gallstones  No pericholecystic inflammatory change  SPLEEN:  Unremarkable  Normal size  PANCREAS:  Unremarkable  ADRENAL GLANDS:  Unremarkable  KIDNEYS/URETERS:  No solid renal mass  No hydronephrosis  No urinary tract calculi  PELVIS REPRODUCTIVE ORGANS:  Unremarkable for patient's age  URINARY BLADDER:  Decompressed  ADDITIONAL ABDOMINAL AND PELVIC STRUCTURES: STOMACH AND BOWEL:  Moderate fecal material in the colon and rectum consistent with constipation  No bowel wall thickening  No intestinal obstruction  A few scattered diverticula are incidentally noted  Appendix is unremarkable   ABDOMINOPELVIC CAVITY:  No pathologically enlarged mesenteric or retroperitoneal lymph nodes  No ascites or free intraperitoneal air  ABDOMINAL WALL/INGUINAL REGIONS:  Unremarkable  OSSEOUS STRUCTURES:  No acute fracture or destructive osseous lesion  Impression: 1  No evidence for acute pulmonary embolism or other acute intrathoracic abnormality  2   Mild atherosclerotic disease of the thoracic and abdominal aorta with no evidence for aneurysmal dilatation, vascular stenosis or occlusion, or dissection  3   7 mm left thyroid nodule which does not necessitate additional workup  4   Extensive aortic valve calcification should be clinically correlated  5   Additional findings as noted  Workstation performed: [id number]     Vas Carotid Complete Study    Result Date: 8/18/2020  Narrative:  [facility] REPORT CLINICAL: Indications: Patient presents for a general health evaluation secondary to future open heart surgery  PT is S/P MI  Evaluation to rule out B/L ICA stenosis  Operative History: Cardiac catheterization Risk Factors The patient has history of HTN, HLD, aortic stenosis, and CAD  The patient's current BMI is 32 24, Weight is 182 lb and height is 63 in  PT is a non-smoker  Clinical Right:  IV site  Left Pressure:  140/66 mm Hg  FINDINGS:  Right                Impression  PSV  EDV (cm/s)  Direction of Flow  Ratio  Dist  ICA                         64          14                      1 10  Mid  ICA                          52          14                      0 89  Prox   ICA                         44          10                      0 76  Carotid Bifurcation  1 - 49%                                                Dist CCA                          44           7                            Mid CCA                           58           8                      0 94  Prox CCA                          62           9                            Ext Carotid                       63           5                      1 09  Prox Vert                         37 9  Antegrade                 Subclavian                        64           0                             Left                 Impression  PSV  EDV (cm/s)  Direction of Flow  Ratio  Dist  ICA                         94          22                      1 28  Mid  ICA                          49          12                      0 67  Prox  ICA            1 - 49%      34          10                      0 47  Dist CCA                          48          10                            Mid CCA                           74          10                      1 14  Prox CCA                          65          11                            Ext Carotid                       47           0                      0 65  Prox Vert                         44           9  Antegrade                 Subclavian                        55           0                               CONCLUSION:  Impression  RIGHT: There is <50% stenosis noted in the internal carotid artery  Plaque is heterogenous and smooth  Vertebral artery flow is antegrade  There is no significant subclavian artery disease  LEFT: There is <50% stenosis noted in the internal carotid artery  Plaque is heterogenous and smooth  Vertebral artery flow is antegrade  There is no significant subclavian artery disease  TECH NOTE:  Minimal plaque was identified in the B/L ICA  Technical findings faxed to chart  Internal carotid artery stenosis determination by consensus criteria from: Whitney Gallo et al  Carotid Artery Stenosis: Gray-Scale and Doppler US Diagnosis - Society of Radiologists in [address] [place] [address], Radiology 2003; [phone]  SIGNATURE: Electronically Signed by: Bianca Vines on 2020-08-18 02:41:25 PM    Vas Lower Limb Vein Mapping Bypass Graft    Result Date: 8/18/2020  Narrative:  [facility] REPORT CLINICAL: Indications: Patient presents for a general health evaluation secondary to future open heart surgery  PT is S/P MI    Evaluation for conduit  Operative History: Cardiac catheterization Risk Factors The patient has history of HTN, HLD, aortic stenosis, and CAD  The patient's current BMI is 32 24, Weight is 182 lb and height is 63 in  PT is a non-smoker  Clinical Right:  IV site  Left Pressure:  140/66 mm Hg  FINDINGS:  Segment         Right        Left                            Diameter AP  Diameter AP  GSV Inguinal            3 8          7 5  GSV Prox Thigh          3 2          4 1  GSV Mid Thigh           2 1          2 6  GSV Dist Thigh          2 4          2 9  GSV Knee                2 5          3 0  GSV Prox Calf           1 5          1 7  GSV Mid Calf            1 0          1 5  GSV Dist Calf           1 2          1 9  GSV Ankle               1 0          1 0     CONCLUSION:  Impression:  RIGHT LOWER LIMB: The great saphenous vein is patent from the groin to the ankle  The vein is of adequate caliber and quality for graft conduit from the groin to the knee  LEFT LOWER LIMB: The great saphenous vein is patent from the groin to the ankle  The vein is of adequate caliber and quality for graft conduit from the groin to the knee  NOTE: There are multiple branches off of the bilateral greater saphenous veins in both the upper and lower leg  Tech note: Technical findings faxed to chart  SIGNATURE: Electronically Signed by: Teresa Ordaz MD on 2020-08-18 04:29:34 PM      EKG personally reviewed by Dalila Yoon MD      Counseling / Coordination of Care  Total floor / unit time spent today 30 minutes  Greater than 50% of total time was spent with the patient and / or family counseling and / or coordination of care

## 2020-08-19 NOTE — RESPIRATORY THERAPY NOTE
RT Ventilator Management Note  Anny Doyle 72 y o  male MRN: [id number]  Unit/Bed#: [facility] 415-01 Encounter: [id number]      Daily Screen     No data found in the last 10 encounters              Physical Exam:   O2 Device: (P) vent      Resp Comments: (P) pt arrived from OR, placed on vent, will continue to monitor

## 2020-08-19 NOTE — RESPIRATORY THERAPY NOTE
RT Ventilator Management Note  Roman Olivarez 72 y o  male MRN: [id number]  Unit/Bed#: [facility] 415-01 Encounter: [id number]      Daily Screen       8/19/2020  1751 8/19/2020  1841          Patient safety screen outcome[de-identified]  Passed  Passed      Spont breathing trial % for 30 min:    Yes      Spont breathing trial outcome[de-identified]    Passed      Name of Medical Team Notified[de-identified]    CCM      Preparing to extubate/ Notify Nurse:  Chadwick Montiel  Yes      Extubation order obtained:    Yes      Consider Cuff Test:    Yes      Patient extubated:    Yes              Physical Exam:   Assessment Type: Assess only  General Appearance: Eyes do not open to any stimulus  Respiratory Pattern: Normal  Chest Assessment: Chest expansion symmetrical  Bilateral Breath Sounds: Clear, Diminished  O2 Device: (P) nc      Resp Comments: (P) pt extubated to 6lnc, 02 sat 96%, no stridor noted, prior to extubation +cuff leak, good verbalization, pt appears comfortable, will continue to monitor

## 2020-08-19 NOTE — OP NOTE
OPERATIVE REPORT  PATIENT NAME: Isaiah Calvert    :  1955  MRN: [id number]  Pt Location: BE OR ROOM 10    SURGERY DATE: 2020    SURGEON: Tricia Canas MD    ASSISTANT: Jason Martinez PA-C    ADDITIONAL ASSISTANT: n/a    PREOPERATIVE DIAGNOSES:   1  Severe aortic stenosis  2  Coronary artery disease and s/p Acute NSTEMI    POSTOPERATIVE DIAGNOSES:  1  Severe aortic stenosis  2  Coronary artery disease and s/p Acute NSTEMI    NYHA: 2  CCS: 4    PROCEDURE:  1  Aortic valve replacement with a 21mm Morrison Inspiris Resilia pericardial tissue valve  2  Coronary artery bypass grafting x 1 with saphenous vein graft to obtuse marginal 2    CARDIOPULMONARY BYPASS TIME 93 minutes  CROSSCLAMP TIME 78 minutes  ANESTHESIA General endotracheal anesthesia with transesophageal echocardiogram guidance, Dr Xena Nuñez     INDICATIONS:  The patient is a 72y o  year-old male diagnosed with Coronary artery disease and s/p Acute NSTEMI  Coronary angiography showed % after OM1 with collateral flow to OM2, no other significant disease  Preop TTE showed severe AS  I saw the patient in consultation and recommended the procedure described previously  FINDINGS:  1  Intraoperative transesophageal echocardiogram was not able to be performed, he might have a esophageal stenosis since the probe could not be advanced     2  Epiaortic/apicardial ultrasound showed less than 5 mm non-mobile plaque and severe AS, LVH, EF 60%  3  Aortic valve was trileaflet, heavily calcified  4  Coronary anatomy as described in coronary angiography  5  Final epiaortic/epicardial echocardiogram demonstrated prosthetic valve with normal function without evidence of perivalvular leak, no other changes  OPERATIVE TECHNIQUE:    The patient was taken to the operating room and placed supine on the operating table   Following the satisfactory induction of general anesthesia and placement of monitoring lines, the patient was prepped and draped in the usual sterile fashion  A time-out procedure was performed  The patient underwent median sternotomy, pericardiotomy, endoscopic left greater saphenous vein harvest, systemic heparinization, and conduit preparation  Epiaortic ultrasound showed less than 5 mm non-mobile plaque  Cannulation for cardiopulmonary bypass was performed with an arterial dispersion cannula, double stage venous cannula and a vented antegrade cardioplegia cannula  Once the ACT was greater than 480 seconds we placed the patient on cardiopulmonary bypass, the ascending aorta was crossclamped and cardiac arrest was obtained without complications with [place] cardioplegia  A left ventricular vent was placed through the right superior pulmonary vein while giving cardioplegia  A CO2 flooded field was used during the entire case  The following grafts were completed, saphenous vein graft to obtuse marginal 2 with flow of 140 ml/min  All the distal anastomoses were performed in end-to-side fashion with 7-0 Prolene  Aortotomy was performed and the aortic valve was exposed  The aortic valve was found to be trileaflet, heavily calcified  The leaflets were excised and the annulus was debrided of calcium  The annulus was sized, I choose a 21mm Morrison Inspiris Resilia pericardial tissue valve  Pledgeted 2-0 Ethibond sutures were placed around the aortic valve annulus  The prosthetic valve was brought to the field, the sutures were passed through the sewing ring, the prosthetic valve was seated in a supra annular fashion and the sutures were tied down  Clearance of the coronary ostia was confirmed  Extensive irrigation of the aortic root and LVOT was performed to remove any debris  While de-airing the heart, the aortotomy was closed with 2 layers of running 4-0 Prolene suture  A total of 1 proximal anastomosis was completed on the ascending aorta in an end-to-side fashion using 6-0 Prolene suture   The heart was extensively de-aired and the crossclamp was removed  Atrial and ventricular pacing wires were placed  Following a period of reperfusion, the patient was weaned from cardiopulmonary bypass and decannulated  Protamine was administered with normalization of the ACT  Hemostasis was confirmed in all fields  Thoracostomy tubes were placed  The sternum was closed with stainless steel wires  The fascia, subdermis and skin were closed as multiple layers of running absorbable suture  COMPLICATIONS: None  PACKS/TUBES/DRAINS: Chest tubes x 2  SPECIMENS: Aortic valve  EBL: 200 vv  TRANSFUSION: None  As the attending surgeon, I was present and scrubbed for all critical portions of this procedure  Sponge, needle and instrument counts were reported as correct by the nursing staff  There was no qualified surgical resident available  The assistance of a PA was required to complete this case, specifically for assistance with cannulation, decannulation, construction of the bypass grafts, exposure during aortic valve replacement, and endoscopic saphenous vein harvesting         SIGNATURE: Robert Ochoa MD  DATE: August 19, 2020  TIME: 4:19 PM

## 2020-08-19 NOTE — ANESTHESIA PROCEDURE NOTES
Arterial Line Insertion  Performed by: Simón Leslie MD  Authorized by: Simón Leslie MD   Consent: Written consent obtained  Risks and benefits: risks, benefits and alternatives were discussed  Consent given by: patient  Patient understanding: patient states understanding of the procedure being performed  Patient consent: the patient's understanding of the procedure matches consent given  Patient identity confirmed: arm band and verbally with patient  Orientation:  Left  Location: radial artery  Procedure Details:  Seldinger technique: Seldinger technique used  Number of attempts: 1    Post-procedure:  Post-procedure: chlorhexidine patch applied  Waveform: good waveform  Post-procedure CNS: normal and unchanged    Comments: Single skin and vessel puncture  Easy thread of wires  No apparent complications

## 2020-08-19 NOTE — ANESTHESIA POSTPROCEDURE EVALUATION
Post-Op Assessment Note    CV Status:  Stable  Pain Score: 0    Pain management: adequate     Mental Status:  Somnolent   Hydration Status:  Stable and euvolemic   PONV Controlled:  None   Airway Patency:  Patent  Airway: intubated      Post Op Vitals Reviewed: Yes      Staff: Anesthesiologist   Comments: FULL REPORT TO ICU NP/RN        No complications documented      BP      Temp      Pulse     Resp      SpO2

## 2020-08-19 NOTE — ANESTHESIA PROCEDURE NOTES
Introducer/Bronson  Performed by: Mac Queen MD  Authorized by: Mac Queen MD     Date/Time: 8/19/2020 1:05 PM  Consent: Verbal consent obtained  Written consent obtained  Risks and benefits: risks, benefits and alternatives were discussed  Consent given by: patient  Patient identity confirmed: arm band and verbally with patient  Indications: vascular access  Location details: right internal jugular  Catheter size: 9 Fr  Assessment: blood return through all ports and free fluid flow  Preparation: skin prepped with 2% chlorhexidine  Skin prep agent dried: skin prep agent completely dried prior to procedure  Sterile barriers: all five maximum sterile barriers used - cap, mask, sterile gown, sterile gloves, and large sterile sheet  Hand hygiene: hand hygiene performed prior to central venous catheter insertion  Ultrasound guidance: yes  Pre-procedure: landmarks identified  Number of attempts: 1  Successful placement: yes  Post-procedure: line sutured and dressing applied  Patient tolerance: Patient tolerated the procedure well with no immediate complications  Comments: Single skin and vessel puncture  Easy thread of wires  Wires confirmed in SVC with ultrasound  No apparent complications

## 2020-08-19 NOTE — UTILIZATION REVIEW
Continued Stay Review    Date: 8/19/2020                          Current Patient Class:  Inpatient   Current Level of Care:  Med Surg     HPI:65 y o  male initially admitted on 8/17/2020  due to NSTEMI s/p cardiac cath which showed multivessel disease and severe aortic stenosis  He was transferred for cardiology and CT surgery evaluation for possible surgical intervention  Assessment/Plan: 8/19:  Ct Surgery evaluation recommends CABG x 1  and  Aortic valve replacement, ( tissue valve ) , he agrees to proceed  OMFS eval no dental issues that require intervention prior to surgery, outpatient follow up for exam and  extraction of carious teeth after    Plan for surgery on 8/19, ( pt currently  In the OR at the time of this review )     Pertinent Labs/Diagnostic Results:   Results from last 7 days   Lab Units 08/18/20  1108   SARS-COV-2  Negative     Results from last 7 days   Lab Units 08/19/20  0620 08/18/20  0530 08/17/20  0453 08/15/20  1453   WBC Thousand/uL 11 30* 13 81* 19 16* 10 90*   HEMOGLOBIN g/dL 15 2 13 8 14 4 15 4   HEMATOCRIT % 48 0 42 0 43 9 48 0   PLATELETS Thousands/uL 200 188 230 234   NEUTROS ABS Thousands/µL  --  10 96*  --  9 28*         Results from last 7 days   Lab Units 08/19/20  0706 08/18/20  0554 08/17/20  0453 08/15/20  1453   SODIUM mmol/L 140 141 141 142   POTASSIUM mmol/L 3 7 3 8 4 2 4 6   CHLORIDE mmol/L 106 109* 106 103   CO2 mmol/L 28 27 26 29   ANION GAP mmol/L 6 5 9 10   BUN mg/dL 15 20 22 10   CREATININE mg/dL 1 15 0 89 1 12 1 07   EGFR ml/min/1 73sq m 66 90 69 72   CALCIUM mg/dL 8 5 8 7 9 0 10 1     Results from last 7 days   Lab Units 08/15/20  1453   AST U/L 20   ALT U/L 29   ALK PHOS U/L 79   TOTAL PROTEIN g/dL 8 2   ALBUMIN g/dL 4 3   TOTAL BILIRUBIN mg/dL 0 50         Results from last 7 days   Lab Units 08/19/20  0706 08/18/20  0554 08/17/20  0453 08/15/20  1453   GLUCOSE RANDOM mg/dL 98 86 126 113         Results from last 7 days   Lab Units 08/19/20  0722   HEMOGLOBIN A1C % 5 5   EAG mg/dl 111     Results from last 7 days   Lab Units 08/16/20  1830 08/16/20  1543 08/16/20  1035 08/16/20  0634 08/16/20  0011   TROPONIN I ng/mL 13 16* 9 78* 12 79* 11 49* 9 67*         Results from last 7 days   Lab Units 08/19/20  0706 08/18/20  2024 08/18/20  1137  08/17/20  2123  08/16/20  0011   PROTIME seconds  --   --   --   --  13 1  --  13 8   INR   --   --   --   --  0 99  --  1 11   PTT seconds 56* 53* 91*   < > 24   < > 80*    < > = values in this interval not displayed       Results from last 7 days   Lab Units 08/15/20  1453   NT-PRO BNP pg/mL 804*     Results from last 7 days   Lab Units 08/18/20  1116   CLARITY UA  Clear   COLOR UA  Yellow   SPEC GRAV UA  1 025   PH UA  6 0   GLUCOSE UA mg/dl Negative   KETONES UA mg/dl Negative   BLOOD UA  Negative   PROTEIN UA mg/dl Negative   NITRITE UA  Negative   BILIRUBIN UA  Negative   UROBILINOGEN UA E U /dl 1 0   LEUKOCYTES UA  Negative       Vital Signs:   Date/Time   Temp   Pulse   Resp   BP   MAP (mmHg)   SpO2   Calculated FIO2 (%) - Nasal Cannula   Nasal Cannula O2 Flow Rate (L/min)   O2 Device   Patient Position - Orthostatic VS    08/19/20 1120                     28   2 L/min   Nasal cannula       08/19/20 1100   98 2 °F (36 8 °C)   56   18   139/60   87   96 %            Sitting    08/19/20 0700   98 2 °F (36 8 °C)   65   18   148/71   102   98 %            Standing    08/19/20 0300   98 °F (36 7 °C)   69   20   119/62   86   97 %         None (Room air)   Lying    08/18/20 2330   98 2 °F (36 8 °C)   65   19   128/57   82   96 %         None (Room air)   Lying    08/18/20 1914   98 9 °F (37 2 °C)   59   18   129/60   87   95 %         None (Room air)   Lying    08/18/20 1457   98 3 °F (36 8 °C)   60   18   121/92   99   96 %         None (Room air)   Sitting          Medications:   Scheduled Medication  aspirin, 81 mg, Oral, Daily   atorvastatin, 40 mg, Oral, QPM  cefazolin, 2,000 mg, Intravenous, Once  chlorhexidine, 15 mL, Mouth/Throat, Q12H ARVIND   famotidine, 20 mg, Oral, BID   loratadine, 10 mg, Oral, Daily  metoprolol tartrate, 25 mg, Oral, Q12H ARVIND  mupirocin, 1 application, Nasal, O88C ARVIND  pantoprazole, 40 mg, Oral, Early Morning      Continuous IV Infusions:     PRN Meds:  acetaminophen, 650 mg, Oral, Q4H PRN  calcium carbonate, 1,000 mg, Oral, Daily PRN   diphenhydrAMINE, 25 mg, Intravenous, Q6H PRN  nitroglycerin, 0 4 mg, Sublingual, Q5 Min PRN  ondansetron, 4 mg, Intravenous, Q6H PRN   simethicone, 80 mg, Oral, 4x Daily PRN   sodium chloride (PF), 3 mL, Intravenous, Q1H PRN   sodium chloride (PF), 3 mL, Intravenous, Q1H PRN        Discharge Plan: D     Network Utilization Review Department  [email or web address]  ATTENTION: Please call with any questions or concerns to [phone] and carefully listen to the prompts so that you are directed to the right person  All voicemails are confidential   Payton Elizabeth all requests for admission clinical reviews, approved or denied determinations and any other requests to dedicated fax number below belonging to the campus where the patient is receiving treatment   List of dedicated fax numbers for the Facilities:  [address] DENIALS (Administrative/Medical Necessity) [phone]   [address] [place] [address] (Maternity/NICU/Pediatrics) [phone]   Slava Challenger [phone]   Arvind Hardin [phone]   [facility] [phone]   Grecia Lat [phone]   [address] [place] [address] [phone]   [facility]  [phone]   [address] [facility]  [address] [facility] [address] [facility] [phone]

## 2020-08-20 ENCOUNTER — TELEPHONE (OUTPATIENT)
Dept: CARDIOLOGY CLINIC | Facility: CLINIC | Age: 65
End: 2020-08-20

## 2020-08-20 ENCOUNTER — APPOINTMENT (INPATIENT)
Dept: RADIOLOGY | Facility: HOSPITAL | Age: 65
DRG: 219 | End: 2020-08-20
Payer: COMMERCIAL

## 2020-08-20 PROBLEM — E83.51 HYPOCALCEMIA: Status: ACTIVE | Noted: 2020-08-20

## 2020-08-20 PROBLEM — H57.89 IRRITATION OF RIGHT EYE: Status: ACTIVE | Noted: 2020-08-20

## 2020-08-20 PROBLEM — D69.6 THROMBOCYTOPENIA (HCC): Status: ACTIVE | Noted: 2020-08-20

## 2020-08-20 LAB
ANION GAP SERPL CALCULATED.3IONS-SCNC: 5 MMOL/L (ref 4–13)
ANION GAP SERPL CALCULATED.3IONS-SCNC: 7 MMOL/L (ref 4–13)
ATRIAL RATE: 71 BPM
BASE EXCESS BLDA CALC-SCNC: -1.6 MMOL/L
BUN SERPL-MCNC: 11 MG/DL (ref 5–25)
BUN SERPL-MCNC: 15 MG/DL (ref 5–25)
CALCIUM SERPL-MCNC: 7.1 MG/DL (ref 8.3–10.1)
CALCIUM SERPL-MCNC: 7.4 MG/DL (ref 8.3–10.1)
CHLORIDE SERPL-SCNC: 105 MMOL/L (ref 100–108)
CHLORIDE SERPL-SCNC: 106 MMOL/L (ref 100–108)
CO2 SERPL-SCNC: 25 MMOL/L (ref 21–32)
CO2 SERPL-SCNC: 28 MMOL/L (ref 21–32)
CREAT SERPL-MCNC: 0.67 MG/DL (ref 0.6–1.3)
CREAT SERPL-MCNC: 0.87 MG/DL (ref 0.6–1.3)
ERYTHROCYTE [DISTWIDTH] IN BLOOD BY AUTOMATED COUNT: 13.4 % (ref 11.6–15.1)
GFR SERPL CREATININE-BSD FRML MDRD: 101 ML/MIN/1.73SQ M
GFR SERPL CREATININE-BSD FRML MDRD: 91 ML/MIN/1.73SQ M
GLUCOSE SERPL-MCNC: 110 MG/DL (ref 65–140)
GLUCOSE SERPL-MCNC: 120 MG/DL (ref 65–140)
GLUCOSE SERPL-MCNC: 123 MG/DL (ref 65–140)
GLUCOSE SERPL-MCNC: 129 MG/DL (ref 65–140)
GLUCOSE SERPL-MCNC: 130 MG/DL (ref 65–140)
GLUCOSE SERPL-MCNC: 135 MG/DL (ref 65–140)
GLUCOSE SERPL-MCNC: 136 MG/DL (ref 65–140)
GLUCOSE SERPL-MCNC: 146 MG/DL (ref 65–140)
GLUCOSE SERPL-MCNC: 152 MG/DL (ref 65–140)
HCO3 BLDA-SCNC: 23.2 MMOL/L (ref 22–28)
HCT VFR BLD AUTO: 39.2 % (ref 36.5–49.3)
HCT VFR BLD AUTO: 40 % (ref 36.5–49.3)
HGB BLD-MCNC: 12.9 G/DL (ref 12–17)
HGB BLD-MCNC: 13.1 G/DL (ref 12–17)
MAGNESIUM SERPL-MCNC: 2.8 MG/DL (ref 1.6–2.6)
MAGNESIUM SERPL-MCNC: 2.8 MG/DL (ref 1.6–2.6)
MCH RBC QN AUTO: 28.4 PG (ref 26.8–34.3)
MCHC RBC AUTO-ENTMCNC: 32.9 G/DL (ref 31.4–37.4)
MCV RBC AUTO: 86 FL (ref 82–98)
O2 CT BLDA-SCNC: 18.9 ML/DL (ref 16–23)
OXYHGB MFR BLDA: 97.3 % (ref 94–97)
P AXIS: 72 DEGREES
PCO2 BLDA: 39.4 MM HG (ref 36–44)
PH BLDA: 7.39 [PH] (ref 7.35–7.45)
PLATELET # BLD AUTO: 123 THOUSANDS/UL (ref 149–390)
PMV BLD AUTO: 12.6 FL (ref 8.9–12.7)
PO2 BLDA: 109 MM HG (ref 75–129)
POTASSIUM SERPL-SCNC: 3.7 MMOL/L (ref 3.5–5.3)
POTASSIUM SERPL-SCNC: 4.2 MMOL/L (ref 3.5–5.3)
POTASSIUM SERPL-SCNC: 4.3 MMOL/L (ref 3.5–5.3)
PR INTERVAL: 154 MS
QRS AXIS: 111 DEGREES
QRSD INTERVAL: 96 MS
QT INTERVAL: 425 MS
QTC INTERVAL: 462 MS
RBC # BLD AUTO: 4.55 MILLION/UL (ref 3.88–5.62)
SODIUM SERPL-SCNC: 138 MMOL/L (ref 136–145)
SODIUM SERPL-SCNC: 138 MMOL/L (ref 136–145)
SPECIMEN SOURCE: ABNORMAL
T WAVE AXIS: 61 DEGREES
VENTRICULAR RATE: 71 BPM
WBC # BLD AUTO: 13.53 THOUSAND/UL (ref 4.31–10.16)

## 2020-08-20 PROCEDURE — 93010 ELECTROCARDIOGRAM REPORT: CPT | Performed by: INTERNAL MEDICINE

## 2020-08-20 PROCEDURE — 99232 SBSQ HOSP IP/OBS MODERATE 35: CPT | Performed by: NURSE PRACTITIONER

## 2020-08-20 PROCEDURE — 83735 ASSAY OF MAGNESIUM: CPT | Performed by: PHYSICIAN ASSISTANT

## 2020-08-20 PROCEDURE — 85027 COMPLETE CBC AUTOMATED: CPT | Performed by: PHYSICIAN ASSISTANT

## 2020-08-20 PROCEDURE — 93005 ELECTROCARDIOGRAM TRACING: CPT

## 2020-08-20 PROCEDURE — 94760 N-INVAS EAR/PLS OXIMETRY 1: CPT

## 2020-08-20 PROCEDURE — 80048 BASIC METABOLIC PNL TOTAL CA: CPT | Performed by: NURSE PRACTITIONER

## 2020-08-20 PROCEDURE — 71045 X-RAY EXAM CHEST 1 VIEW: CPT

## 2020-08-20 PROCEDURE — 97535 SELF CARE MNGMENT TRAINING: CPT

## 2020-08-20 PROCEDURE — 84132 ASSAY OF SERUM POTASSIUM: CPT | Performed by: PHYSICIAN ASSISTANT

## 2020-08-20 PROCEDURE — 97167 OT EVAL HIGH COMPLEX 60 MIN: CPT

## 2020-08-20 PROCEDURE — 97110 THERAPEUTIC EXERCISES: CPT

## 2020-08-20 PROCEDURE — 82805 BLOOD GASES W/O2 SATURATION: CPT | Performed by: PHYSICIAN ASSISTANT

## 2020-08-20 PROCEDURE — NC001 PR NO CHARGE: Performed by: NURSE PRACTITIONER

## 2020-08-20 PROCEDURE — 82948 REAGENT STRIP/BLOOD GLUCOSE: CPT

## 2020-08-20 PROCEDURE — 83735 ASSAY OF MAGNESIUM: CPT | Performed by: NURSE PRACTITIONER

## 2020-08-20 PROCEDURE — 85014 HEMATOCRIT: CPT | Performed by: PHYSICIAN ASSISTANT

## 2020-08-20 PROCEDURE — 85018 HEMOGLOBIN: CPT | Performed by: PHYSICIAN ASSISTANT

## 2020-08-20 PROCEDURE — 97163 PT EVAL HIGH COMPLEX 45 MIN: CPT

## 2020-08-20 PROCEDURE — 80048 BASIC METABOLIC PNL TOTAL CA: CPT | Performed by: PHYSICIAN ASSISTANT

## 2020-08-20 PROCEDURE — 99233 SBSQ HOSP IP/OBS HIGH 50: CPT | Performed by: EMERGENCY MEDICINE

## 2020-08-20 PROCEDURE — 99024 POSTOP FOLLOW-UP VISIT: CPT | Performed by: PHYSICIAN ASSISTANT

## 2020-08-20 RX ORDER — FONDAPARINUX SODIUM 2.5 MG/.5ML
2.5 INJECTION SUBCUTANEOUS DAILY
Status: DISCONTINUED | OUTPATIENT
Start: 2020-08-20 | End: 2020-08-20

## 2020-08-20 RX ORDER — DOCUSATE SODIUM 100 MG/1
100 CAPSULE, LIQUID FILLED ORAL 2 TIMES DAILY
Status: DISCONTINUED | OUTPATIENT
Start: 2020-08-20 | End: 2020-08-22 | Stop reason: HOSPADM

## 2020-08-20 RX ORDER — FUROSEMIDE 10 MG/ML
40 INJECTION INTRAMUSCULAR; INTRAVENOUS DAILY
Status: DISCONTINUED | OUTPATIENT
Start: 2020-08-20 | End: 2020-08-21

## 2020-08-20 RX ORDER — POTASSIUM CHLORIDE 20 MEQ/1
20 TABLET, EXTENDED RELEASE ORAL DAILY
Status: DISCONTINUED | OUTPATIENT
Start: 2020-08-20 | End: 2020-08-21

## 2020-08-20 RX ORDER — ACETAMINOPHEN 325 MG/1
650 TABLET ORAL EVERY 4 HOURS PRN
Status: DISCONTINUED | OUTPATIENT
Start: 2020-08-20 | End: 2020-08-22 | Stop reason: HOSPADM

## 2020-08-20 RX ORDER — TEMAZEPAM 15 MG/1
15 CAPSULE ORAL
Status: DISCONTINUED | OUTPATIENT
Start: 2020-08-20 | End: 2020-08-22 | Stop reason: HOSPADM

## 2020-08-20 RX ORDER — POTASSIUM CHLORIDE 14.9 MG/ML
20 INJECTION INTRAVENOUS ONCE
Status: COMPLETED | OUTPATIENT
Start: 2020-08-20 | End: 2020-08-21

## 2020-08-20 RX ADMIN — MUPIROCIN 1 APPLICATION: 20 OINTMENT TOPICAL at 20:49

## 2020-08-20 RX ADMIN — FUROSEMIDE 40 MG: 10 INJECTION, SOLUTION INTRAMUSCULAR; INTRAVENOUS at 08:13

## 2020-08-20 RX ADMIN — MUPIROCIN 1 APPLICATION: 20 OINTMENT TOPICAL at 08:12

## 2020-08-20 RX ADMIN — FONDAPARINUX SODIUM 2.5 MG: 2.5 INJECTION, SOLUTION SUBCUTANEOUS at 08:13

## 2020-08-20 RX ADMIN — CHLORHEXIDINE GLUCONATE 0.12% ORAL RINSE 15 ML: 1.2 LIQUID ORAL at 18:28

## 2020-08-20 RX ADMIN — ACETAMINOPHEN 975 MG: 325 TABLET, FILM COATED ORAL at 08:15

## 2020-08-20 RX ADMIN — ASPIRIN 325 MG ORAL TABLET 325 MG: 325 PILL ORAL at 08:14

## 2020-08-20 RX ADMIN — POTASSIUM CHLORIDE 20 MEQ: 14.9 INJECTION, SOLUTION INTRAVENOUS at 23:09

## 2020-08-20 RX ADMIN — PANTOPRAZOLE SODIUM 40 MG: 40 TABLET, DELAYED RELEASE ORAL at 08:12

## 2020-08-20 RX ADMIN — CEFAZOLIN SODIUM 1000 MG: 1 SOLUTION INTRAVENOUS at 00:22

## 2020-08-20 RX ADMIN — ACETAMINOPHEN 975 MG: 325 TABLET, FILM COATED ORAL at 00:21

## 2020-08-20 RX ADMIN — POLYETHYLENE GLYCOL 3350 17 G: 17 POWDER, FOR SOLUTION ORAL at 08:12

## 2020-08-20 RX ADMIN — ACETAMINOPHEN 975 MG: 325 TABLET, FILM COATED ORAL at 18:27

## 2020-08-20 RX ADMIN — CEFAZOLIN SODIUM 1000 MG: 1 SOLUTION INTRAVENOUS at 08:12

## 2020-08-20 RX ADMIN — DOCUSATE SODIUM 100 MG: 100 CAPSULE, LIQUID FILLED ORAL at 08:12

## 2020-08-20 RX ADMIN — ATORVASTATIN CALCIUM 80 MG: 80 TABLET, FILM COATED ORAL at 16:33

## 2020-08-20 RX ADMIN — METOPROLOL TARTRATE 25 MG: 25 TABLET, FILM COATED ORAL at 08:12

## 2020-08-20 RX ADMIN — POTASSIUM CHLORIDE 20 MEQ: 1500 TABLET, EXTENDED RELEASE ORAL at 08:12

## 2020-08-20 RX ADMIN — SODIUM CHLORIDE 7.5 MG/HR: 0.9 INJECTION, SOLUTION INTRAVENOUS at 02:58

## 2020-08-20 RX ADMIN — METOPROLOL TARTRATE 25 MG: 25 TABLET, FILM COATED ORAL at 20:49

## 2020-08-20 RX ADMIN — DOCUSATE SODIUM 100 MG: 100 CAPSULE, LIQUID FILLED ORAL at 18:28

## 2020-08-20 RX ADMIN — CHLORHEXIDINE GLUCONATE 0.12% ORAL RINSE 15 ML: 1.2 LIQUID ORAL at 08:12

## 2020-08-20 RX ADMIN — CEFAZOLIN SODIUM 1000 MG: 1 SOLUTION INTRAVENOUS at 16:26

## 2020-08-20 NOTE — PLAN OF CARE
Problem: OCCUPATIONAL THERAPY ADULT  Goal: Performs self-care activities at highest level of function for planned discharge setting  See evaluation for individualized goals  Description: Treatment Interventions: Patient/family training, Cardiac education, Compensatory technique education          See flowsheet documentation for full assessment, interventions and recommendations  Outcome: Completed  Note: Limitation: Decreased ADL status, Decreased endurance, Decreased high-level ADLs  Prognosis: Good  Assessment: Pt is a 72 y o  YO  male admitted to [facility] on 2020 w/ NSTEMI s/p CABG x 1/AVR on 20  Pt  has a past medical history of HLD (hyperlipidemia) and HTN (hypertension)    Pt with active OT orders and ambulate  orders    Pt resides in a ranch style home with spouse and two teenage dtrs  Pt reports his older dtr is moving into college tomorrow    Pt was I w/  ADLS and IADLS, (+) drove, & required no use of DME PTA  Currently pt is supervision for UB ADls and Min A for LB ADls and functional mobility  Pt is limited at this time 2*: endurance, activity tolerance, functional mobility, unsupportive home environment and decreased I w/ ADLS/IADLS  The following Occupational Performance Areas to address include: grooming, bathing/shower, toilet hygiene, dressing, functional mobility and household maintenance  Based on the aforementioned OT evaluation, functional performance deficits, and assessments, pt has been identified as a high complexity evaluation  From OT standpoint, anticipate d/c home with family support  Pt to continue to benefit from acute immediate OT services to address the following goals 1-2 sessions to  w/in 3-5 days:  OT Discharge Recommendation: Return to previous environment with social support  OT - OK to Discharge:  Yes

## 2020-08-20 NOTE — RESTORATIVE TECHNICIAN NOTE
Restorative Specialist Mobility Note       Activity: Ambulate in roblero, Chair     Assistive Device: Front wheel walker

## 2020-08-20 NOTE — PHYSICAL THERAPY NOTE
Physical Therapy Evaluation     Patient's Name: Gabbi Belcher    Admitting Diagnosis  Chest pain [R07 9]    Problem List  Patient Active Problem List   Diagnosis    NSTEMI (non-ST elevated myocardial infarction) ([address] )    Accelerated hypertension    Elevated brain natriuretic peptide (BNP) level    Allergic reaction to contrast dye    Chest pain    Leukocytosis    Aortic stenosis    HTN (hypertension)    HLD (hyperlipidemia)    CAD (coronary artery disease)    S/P CABG (coronary artery bypass graft)    S/P AVR (aortic valve replacement)    Thrombocytopenia (HCC)    Hypocalcemia    Irritation of right eye       Past Medical History  Past Medical History:   Diagnosis Date    HLD (hyperlipidemia)     HTN (hypertension)        Past Surgical History  Past Surgical History:   Procedure Laterality Date    CORONARY ARTERY BYPASS GRAFT WITH VALVE REPLACEMENT Left 8/19/2020    Procedure: CORONARY ARTERY BYPASS GRAFT (CABG) x 1; Left EVH/SVG to OM2; AVR with Morrison 21 mm Inspiris Tissue Valve;  Surgeon: Ganesh Echeverria MD;  [facility];  Service: Cardiac Surgery    KNEE ARTHROSCOPY Left     ROTATOR CUFF REPAIR Left           08/20/20 0900   Note Type   Note type Eval/Treat   Pain Assessment   Pain Assessment Tool 0-10   Pain Score 3   Pain Location/Orientation Orientation: Mid;Location: Chest;Location: Incision   Pain Onset/Description Onset: Ongoing; Descriptor: Aching;Descriptor: Discomfort   Effect of Pain on Daily Activities guarding   Patient's Stated Pain Goal No pain   Hospital Pain Intervention(s) Repositioned; Ambulation/increased activity; Emotional support   Home Living   Type of [address] [place] [address] Two level; Able to live on main level with bedroom/bathroom; Ramped entrance  (1st floor set-up w/ ramp to enter)   Prior Function   Level of [place] Independent with ADLs and functional mobility  (amb w/o AD)   Lives With Spouse; Family  (2 dtrs)   Receives Help From   (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)   Vocational Full time employment  (OT)   Restrictions/Precautions   Braces or Orthoses   (denies)   Other Precautions Cardiac/sternal;Multiple lines;Telemetry;O2;Fall Risk;Pain  (CT; a-line)   General   Additional Pertinent History cleared for assessment (spoke to nsstacey)   Cognition   [facility]   Arousal/Participation Alert   Attention Attends with cues to redirect   Orientation Level Oriented to person;Oriented to place;Oriented to situation   Memory Within functional limits   Following Commands Follows one step commands without difficulty   Comments Pt is in the chair; somewhat flat affect; agreeable to try mobilization   RUE Assessment   RUE Assessment WFL  (AROM)   LUE Assessment   LUE Assessment WFL  (AROM)   RLE Assessment   RLE Assessment WFL  (AROM)   Strength RLE   RLE Overall Strength   (fair +/ good - (grossly))   LLE Assessment   LLE Assessment WFL  (AROM)   Strength LLE   LLE Overall Strength   (fair +/ good - (grossly))   Wound 08/19/20 Chest N/A   Date First Assessed/Time First Assessed: 08/19/20 1354   Location: Chest  Wound Location Orientation: N/A  Wound Description (Comments): chloraprep applied prior to dressing  Incision's 1st Dressing: DRESSING ACTICOAT POST OP 4 X 10 IN STRL (x1)   Wound Description JOSE   Giovanna-wound Assessment JOSE   Dressing Silver dressing   Dressing Status Clean;Dry; Intact   Wound 08/19/20 Chest N/A   Date First Assessed/Time First Assessed: 08/19/20 1354   Location: Chest  Wound Location Orientation: N/A  Wound Description (Comments): 2 chest tube sites    chloraprep applied to site prior to dressing  Incision's 1st Dressing: SPONGE GAUZE 4 X 4 16  Wound Description Dry;Clean; Intact   Giovanna-wound Assessment Clean; Intact;Dry   Drainage Amount None   Dressing Gauze;Dry dressing   Dressing Changed Changed   Patient Tolerance Tolerated well   Dressing Status Clean;Dry; Intact   Wound 08/19/20 Leg Left   Date First Assessed/Time First Assessed: 08/19/20 1354   Location: Leg  Wound Location Orientation: Left  Incision's 1st Dressing: SPONGE GAUZE 4 X 4 16 PLY STRL PLASTIC TRAY LF (x1), ACE WRAP 6 IN UNSTERILE (x2), DRESSING ACTICOAT SURG 4 X 4-3/4 IN STRL   Wound Description Clean;Dry; Intact   Giovanna-wound Assessment Clean;Dry; Intact   Drainage Amount Scant   Drainage Description Bloody   Dressing Silver dressing; Other (Comment)  (ace wrap)   Dressing Status Clean;Dry; Intact   Transfers   Sit to Stand 4  Minimal assistance   Additional items Assist x 1;Verbal cues   Stand to Sit 4  Minimal assistance   Additional items Assist x 1;Verbal cues   Ambulation/Elevation   Gait pattern Excessively slow; Short stride; Inconsistent cleo   Gait Assistance 4  Minimal assist   Additional items Assist x 1;Verbal cues; Tactile cues  (stand by (A) of 3 more staff for lines and chair follow)   Assistive Device Rolling walker   Distance 320 ft   Balance   Static Sitting Fair   Dynamic Sitting Fair -   Static Standing Poor +   Ambulatory Poor +   Activity Tolerance   Activity Tolerance Patient limited by fatigue   Nurse Made Aware spoke to ADRIAN Spring   Assessment   Prognosis Good   Problem List Decreased strength;Decreased mobility; Decreased endurance; Impaired balance;Obesity;Pain   Assessment Pt is 72 y o  male initially admitted to [facility] with hx of substernal back pain and heartburn with radiation to the neck and back and Dx of NSTEMI (non-ST elevated myocardial infarction); cardiac cath revealed CAD; al echocardiogram also demonstrated severe AS, moderate AI; [facility] he underwent Aortic valve replacement with a 21mm Morrison Inspiris Resilia pericardial tissue valve and coronary artery bypass grafting x 1 with saphenous vein graft to obtuse marginal 2 on 8/19/2020  Pt 's comorbidities affecting POC include: HTN and personal factors of: 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)   Pt's clinical presentation is currently unstable/unpredictable which is evident in ongoing telemetry monitoring while in a critical care unit w/ a-line in place, abnormal lab values being monitored/trending, CT in place and suppl O2 needs  Pt presents w/ postop discomfort and guarding, generalized weakness, incl decreased LE strength, decreased functional endurance, and min impaired balance w/ associated gait deviations requiring use of rw at this time  Will cont to follow pt in PT for progressive mobilization to address above functional deficits and to max level of (I), endurance, and safety  Otherwise, anticipate pt will return home upon D/C provided he cont improving w/ mobility skills, safety, and endurance and when medically cleared; home PT follow up may be considered pending progress; will follow  Barriers to Discharge Decreased caregiver support   Goals   Patient Goals to go home   STG Expiration Date 08/30/20   Short Term Goal #1 7-10 days  Pt will amb 400 ft w/ least restrictive assistive device PRN, mod (I) in order to facilitate safe return to premorbid environment and community amb status  Pt will achieve (I) level w/ bed mob in order to facilitate safety with OOB and back to bed transitions in own living environment  Pt will perform transfers w/ mod (I) to assure (I) and safety w/ functional mobility/transitions w/ all aspects of mobility/locomotion  Pt will participate in LE therex and balance activities to max progression w/ mobility skills  PT Treatment Day 1  (follow up Tx session)   Plan   Treatment/Interventions Functional transfer training;LE strengthening/ROM; Therapeutic exercise; Endurance training;Equipment eval/education; Bed mobility;Gait training;Spoke to nursing;OT   PT Frequency Other (Comment)  (4-6x/wk)   Recommendation   PT Discharge Recommendation Return to previous environment with social support; Other (Comment)  (r/o home PT pending progress)   Equipment Recommended Walker  (at this time) Robb Dorantes, PT                        PT Tx note    Time In: 09:05  Time Out: 09:15  Total Time: 10 min      S:  Pt is in the chair; agreeable to perform LE therex     O:  (B) LE therex/HEP performed in the chair as following: (B) ankle pumps 2 x 10 reps, AROM; (B) LAQ 2 x 10 reps, AROM; marching 2 x 5 reps, AROM; SCD re-activated; pillow placed for UE support; call bell placed w/in reach;     A:  Additional follow up consecutive session performed to initiate LE therex in order to max strength and to facilitate progression w/ functional mobility skills and overall level of (I) and to initiate HEP  Pt was able to complete the program in an AROM mode w/ rest periods and verbal instructions for proper form and breathing patterns provided as needed; pt remained in NAD and appeared to be comfortable at the end of session; currently, cont to anticipate pt will return home upon D/C pending additional progress and when medically cleared; home PT follow up may be considered depending on functional status at D/C; will follow  P:  Cont to follow pt 4-6x/week for LE therex, functional mobility training, endurance training and pt education per POC to max functional (I) and safety        Robb Dorantes, PT

## 2020-08-20 NOTE — PROGRESS NOTES
General Cardiology   Progress Note -  [employer]   Mitchell Colindres 72 y o  male MRN: [id number]    Unit/Bed#: [facility] 415-01 Encounter: [id number]    Assessment:    1  CAD s/p CABg x1   2  Severe Aortic Stenosis: s p AVR  3  HTN  4  HLD  5  Preserved LV systolic function EF 18%    Plan:  Pt is s/p CABG x1 with SVG to OM2 and Aortic valve replacement with a 21mm Morrison Inspiris Resilia pericardial tissue valve  No events overnight  He is off vasoactive infusions  On appropriate medications with ASA, statin and BB  No amiodarone given iodone allergy  Maintaining SR  On IV lasix for post op volume overload;' Cr and K stable  Fever overnight but resolved with tylenol  CT and EPW remain  Encourage ambulation and IS; remainder of post op care per cardiac surgery  Will follow  Subjective: Pt seen for post-operative follow up; he underwent CABG x1/AVR yesterday  Procedure uncomplicated per OP report; he was extubated last night; weaned off any vasoactive infusions  Being moved out of critical care unit today  Reports feeling well today; was able to walk in halls with assistance this AM  Pain well controlled  No complaints  Review of Systems   Constitution: Positive for decreased appetite  Negative for fever and malaise/fatigue  Cardiovascular: Positive for chest pain (incisional pain with inspiration)  Negative for dyspnea on exertion, leg swelling, orthopnea, palpitations and syncope  Respiratory: Negative for cough, shortness of breath and wheezing  Gastrointestinal: Negative for abdominal pain, nausea and vomiting  Genitourinary: Negative for dysuria  Neurological: Negative for dizziness and light-headedness  Psychiatric/Behavioral: Negative for altered mental status  All other systems reviewed and are negative  Objective:   Vitals: Blood pressure 139/60, pulse 74, temperature 99 1 °F (37 3 °C), temperature source Probe, resp   rate 18, height 5' 3" (1 6 m), weight 83 7 kg (184 lb 8 4 oz), SpO2 100 %  ,     Body mass index is 32 69 kg/m²  ,     Systolic (81NUQ), SIX:477 , Min:139 , OIP:826     Diastolic (80ALR), VNC:55, Min:60, Max:60      Intake/Output Summary (Last 24 hours) at 8/20/2020 1011  Last data filed at 8/20/2020 0600  Gross per 24 hour   Intake 4903 8 ml   Output 4200 ml   Net 703 8 ml     Weight (last 2 days)     Date/Time   Weight    08/20/20 0600   83 7 (184 53)            EKG personally reviewed by YENIFER Montero   8/19/2020  Sinus rhythm  Non-specific twave abnormalities    Physical Exam  Vitals signs reviewed  Constitutional:       General: He is not in acute distress  Appearance: He is not ill-appearing  HENT:      Head: Normocephalic  Neck:      Comments: RIJ TLC intact  Cardiovascular:      Rate and Rhythm: Normal rate and regular rhythm  Heart sounds: S1 normal and S2 normal  No murmur  No friction rub  Comments: Trace b/l LE edema  MS incision dsg C/d/I  CT and EPW intact  Pulmonary:      Breath sounds: No wheezing or rales  Abdominal:      Palpations: Abdomen is soft  Musculoskeletal:         General: No swelling  Skin:     General: Skin is warm and dry  Neurological:      General: No focal deficit present  Mental Status: He is alert and oriented to person, place, and time     Psychiatric:         Mood and Affect: Mood normal        LABORATORY RESULTS  Results from last 7 days   Lab Units 08/16/20  1830 08/16/20  1543 08/16/20  1035   TROPONIN I ng/mL 13 16* 9 78* 12 79*     CBC with diff:   Results from last 7 days   Lab Units 08/20/20  0424 08/20/20  0031 08/19/20  1708 08/19/20  1707 08/19/20  1559 08/19/20  1539 08/19/20  1524 08/19/20  1519  08/19/20  0620 08/18/20  0530 08/17/20  0453 08/15/20  1453   WBC Thousand/uL 13 53*  --   --   --   --   --   --   --   --  11 30* 13 81* 19 16* 10 90*   HEMOGLOBIN g/dL 12 9 13 1 13 0  --   --   --   --   --   --  15 2 13 8 14 4 15 4   I STAT HEMOGLOBIN g/dl  --   --   --  12 2 9 2* 8 8* 9 5*  -- < >  --   --   --   --    HEMATOCRIT % 39 2 40 0 39 1  --   --   --   --   --   --  48 0 42 0 43 9 48 0   HEMATOCRIT, ISTAT %  --   --   --  36* 27* 26* 28*  --    < >  --   --   --   --    MCV fL 86  --   --   --   --   --   --   --   --  88 86 85 85   PLATELETS Thousands/uL 123*  --  112*  --   --   --   --  134*  --  200 188 230 234   MCH pg 28 4  --   --   --   --   --   --   --   --  28 0 28 3 28 0 27 4   MCHC g/dL 32 9  --   --   --   --   --   --   --   --  31 7 32 9 32 8 32 1   RDW % 13 4  --   --   --   --   --   --   --   --  13 5 13 6 13 4 13 2   MPV fL 12 6  --  12 3  --   --   --   --  12 5  --  12 5 12 5 12 0 12 0   NRBC AUTO /100 WBCs  --   --   --   --   --   --   --   --   --   --  0  --  0    < > = values in this interval not displayed       CMP:  Results from last 7 days   Lab Units 08/20/20  0424 08/20/20  0031 08/19/20  2114 08/19/20  1708 08/19/20  1707 08/19/20  1559 08/19/20  1539 08/19/20  1524 08/19/20  1453 08/19/20  1423 08/19/20  1359  08/19/20  0706 08/18/20  0554 08/17/20  0453 08/15/20  1453   POTASSIUM mmol/L 4 2 4 3 4 1 4 0  --   --   --   --   --   --   --   --  3 7 3 8 4 2 4 6   CHLORIDE mmol/L 106  --   --  110*  --   --   --   --   --   --   --   --  106 109* 106 103   CO2 mmol/L 25  --   --  22  --   --   --   --   --   --   --   --  28 27 26 29   CO2, I-STAT mmol/L  --   --   --   --  23 26 25 27 25 25 26   < >  --   --   --   --    BUN mg/dL 11  --   --  13  --   --   --   --   --   --   --   --  15 20 22 10   CREATININE mg/dL 0 67  --   --  0 81  --   --   --   --   --   --   --   --  1 15 0 89 1 12 1 07   GLUCOSE, ISTAT mg/dl  --   --   --   --  114 122 130 126 110 100 107   < >  --   --   --   --    CALCIUM mg/dL 7 1*  --   --  7 3*  --   --   --   --   --   --   --   --  8 5 8 7 9 0 10 1   AST U/L  --   --   --   --   --   --   --   --   --   --   --   --   --   --   --  20   ALT U/L  --   --   --   --   --   --   --   --   --   --   --   --   --   --   --  29   ALK PHOS U/L  --   --   --   --   --   --   --   --   --   --   --   --   --   --   --  79   EGFR ml/min/1 73sq m 101  --   --  93  --   --   --   --   --   --   --   --  66 90 69 72    < > = values in this interval not displayed  BMP:  Results from last 7 days   Lab Units 08/20/20  0424 08/20/20  0031 08/19/20  2114 08/19/20  1708 08/19/20  1707 08/19/20  1559 08/19/20  1539 08/19/20  1524 08/19/20  1453  08/19/20  0706 08/18/20  0554 08/17/20  0453 08/15/20  1453   POTASSIUM mmol/L 4 2 4 3 4 1 4 0  --   --   --   --   --   --  3 7 3 8 4 2 4 6   CHLORIDE mmol/L 106  --   --  110*  --   --   --   --   --   --  106 109* 106 103   CO2 mmol/L 25  --   --  22  --   --   --   --   --   --  28 27 26 29   CO2, I-STAT mmol/L  --   --   --   --  23 26 25 27 25   < >  --   --   --   --    BUN mg/dL 11  --   --  13  --   --   --   --   --   --  15 20 22 10   CREATININE mg/dL 0 67  --   --  0 81  --   --   --   --   --   --  1 15 0 89 1 12 1 07   GLUCOSE, ISTAT mg/dl  --   --   --   --  114 122 130 126 110   < >  --   --   --   --    CALCIUM mg/dL 7 1*  --   --  7 3*  --   --   --   --   --   --  8 5 8 7 9 0 10 1    < > = values in this interval not displayed       Lab Results   Component Value Date    NTBNP 804 (H) 08/15/2020     Results from last 7 days   Lab Units 08/20/20  0424   MAGNESIUM mg/dL 2 8*     Results from last 7 days   Lab Units 08/19/20  0722   HEMOGLOBIN A1C % 5 5     Results from last 7 days   Lab Units 08/17/20  2123 08/16/20  0011   INR  0 99 1 11     Lipid Profile:   No results found for: CHOL  Lab Results   Component Value Date    HDL 39 (L) 08/17/2020    HDL 36 (L) 08/17/2020     Lab Results   Component Value Date    LDLCALC 109 (H) 08/17/2020    LDLCALC 132 (H) 08/17/2020     Lab Results   Component Value Date    TRIG 285 (H) 08/17/2020    TRIG 150 08/17/2020     Cardiac testing:   Results for orders placed during the hospital encounter of 08/15/20   Echo complete with contrast if indicated    Narrative [address] [place] [id number]  [phone]    Transthoracic Echocardiogram  2D, M-mode, and Color Doppler    Study date:  16-Aug-2020    Patient: Cullen Ramirez  MR number: [id number]  Account number: [de-identified]  : 1955  Age: 72 years  Gender: Male  Status: Inpatient  Location: Bedside  Height: 63 in  Weight: 190 lb  BP: 133/ 63 mmHg    Indications: Murmur  Diagnoses: R01 1 - Cardiac murmur, unspecified    Sonographer:  Duncan RCS  Referring Physician:  Michaela Quevedo  Group:  [facility]  Interpreting Physician:  Argentina Ramesh MD    SUMMARY    LEFT VENTRICLE:  Systolic function was normal  Ejection fraction was estimated to be 60 %  There were no regional wall motion abnormalities  There was moderate-severe concentric hypertrophy  RIGHT VENTRICLE:  The size was normal   Systolic function was normal     MITRAL VALVE:  There was mild annular calcification  There was mild stenosis  There was mild to moderate regurgitation  AORTIC VALVE:  The valve was not visualized well enough to rule out a bicuspid morphology  Leaflets exhibited increased thickness and calcification with reduced cuspal separation  Transaortic velocity and gradient were increased due to stenosis as well as concomitant increased transaortic flow  There was severe stenosis  Peak and mean AV gradients were 102 and 57 mm Hg respectively  KAUR by the continuity equation method was 0 6 sq cm  There was moderate regurgitation  TRICUSPID VALVE:  There was trace regurgitation  Estimated peak PA pressure was 44 mmHg  PULMONIC VALVE:  There was trace regurgitation  HISTORY: PRIOR HISTORY: NSTEMI  Hypertension  PROCEDURE: The procedure was performed at the bedside  This was a routine study  The transthoracic approach was used  The study included complete 2D imaging, M-mode, and color Doppler   The heart rate was 84 bpm, at the start of the study  Images were obtained from the parasternal, apical, subcostal, and suprasternal notch acoustic windows  Image quality was adequate  LEFT VENTRICLE: Size was normal  Systolic function was normal  Ejection fraction was estimated to be 60 %  There were no regional wall motion abnormalities  There was moderate-severe concentric hypertrophy  No evidence of apical thrombus  DOPPLER: Left ventricular diastolic function parameters were normal     RIGHT VENTRICLE: The size was normal  Systolic function was normal  Wall thickness was normal     LEFT ATRIUM: Size was normal     RIGHT ATRIUM: Size was normal     MITRAL VALVE: There was mild annular calcification  There was normal leaflet separation  DOPPLER: There was mild stenosis  There was mild to moderate regurgitation  AORTIC VALVE: The valve was not visualized well enough to rule out a bicuspid morphology  Leaflets exhibited increased thickness and calcification with reduced cuspal separation  DOPPLER: Transaortic velocity and gradient were increased  due to stenosis as well as concomitant increased transaortic flow  There was severe stenosis  Peak and mean AV gradients were 102 and 57 mm Hg respectively  KAUR by the continuity equation method was 0 6 sq cm  There was moderate  regurgitation  TRICUSPID VALVE: The valve structure was normal  There was normal leaflet separation  DOPPLER: The transtricuspid velocity was within the normal range  There was no evidence for stenosis  There was trace regurgitation  Estimated peak PA  pressure was 44 mmHg  PULMONIC VALVE: Leaflets exhibited normal thickness, no calcification, and normal cuspal separation  DOPPLER: The transpulmonic velocity was within the normal range  There was trace regurgitation  PERICARDIUM: There was no pericardial effusion  The pericardium was normal in appearance  AORTA: The root exhibited normal size  SYSTEMIC VEINS: IVC: The inferior vena cava was normal in size   The respirophasic change in diameter was less than 50%  SYSTEM MEASUREMENT TABLES    2D  %FS: 25 1 %  Ao Diam: 3 7 cm  EDV(Teich): 88 3 ml  EF(Teich): 49 8 %  ESV(Teich): 44 3 ml  IVSd: 1 8 cm  LA Area: 16 9 cm2  LA Diam: 4 3 cm  LVEDV MOD A4C: 123 9 ml  LVEF MOD A4C: 50 7 %  LVESV MOD A4C: 61 ml  LVIDd: 4 4 cm  LVIDs: 3 3 cm  LVLd A4C: 8 9 cm  LVLs A4C: 8 1 cm  LVOT Diam: 1 9 cm  LVPWd: 1 4 cm  RA Area: 14 4 cm2  RVIDd: 3 3 cm  SV MOD A4C: 62 9 ml  SV(Teich): 44 ml    CW  AR Dec Carson: 2 3 m/s2  AR Dec Time: 1441 5 ms  AR PHT: 418 ms  AR Vmax: 3 2 m/s  AR maxP 5 mmHg  AV Env  Ti: 363 5 ms  AV VTI: 127 5 cm  AV Vmax: 4 8 m/s  AV Vmean: 3 5 m/s  AV maxP 7 mmHg  AV meanP 2 mmHg  TR Vmax: 3 m/s  TR maxP 2 mmHg    MM  TAPSE: 2 1 cm    PW  KAUR (VTI): 0 6 cm2  KAUR Vmax: 0 5 cm2  E': 0 1 m/s  E/E': 22 8  LVOT Env  Ti: 365 4 ms  LVOT VTI: 28 2 cm  LVOT Vmax: 0 9 m/s  LVOT Vmean: 0 8 m/s  LVOT maxPG: 3 6 mmHg  LVOT meanP 5 mmHg  LVSV Dopp: 77 2 ml  MV A Evens: 0 8 m/s  MV Dec Carson: 3 6 m/s2  MV DecT: 354 5 ms  MV E Evens: 1 3 m/s  MV E/A Ratio: 1 6  MV PHT: 102 8 ms  MVA By PHT: 2 1 cm2    [employer] Accredited Echocardiography Laboratory    Prepared and electronically signed by    Argentina Ramesh MD  Signed 16-Aug-2020 15:02:20       No results found for this or any previous visit    Results for orders placed during the hospital encounter of 08/15/20   Cardiac coronary angio/lhc/pci    Narrative [address] [place] [address]  [phone]    [facility]    Invasive Cardiovascular Lab Complete Report    Patient: Cullen Ramirez  MR number: [id number]  Account number: [de-identified]  Study date: 2020  Gender: Male  : 1955  Height: 63 in  Weight: 189 6 lb  BSA: 1 89 mï¾²    Allergies: IODINATED DIAGNOSTIC AGENTS    Diagnostic Cardiologist:  Leela Fernando MD    SUMMARY    CORONARY CIRCULATION:  The coronary circulation is right dominant  LAD: The vessel was large sized  Proximal LAD 50% stenosis(seen in LE/Swedish CAU view) s/p negative iFR 0 96  Mid LAD has focal 50% stenosis at the origin of D2 s/p positive iFR 0 88 with brisk flow distally  D2 small vessel with proximal 50% stenosis  Circumflex: The vessel was large sized  Mid % occluded  OM2 fills from right to left collaterals  OM1 is medium caliber long vessel with luminal irreguilarities  RCA: The vessel was normal sized  Angiography showed mild atherosclerosis  Right to left collateral visualized filling OM    INDICATIONS:  Possible CAD: myocardial infarction without ST elevation (NSTEMI)  IMPRESSIONS:  Mid % occlusion and distally it fills from right to left collaterals  Proximal LAD 50% stenosis s/p negative iFR - 0 96  Focal mid LAD 50% stenosis s/p positive iFR - 0 88  Mild atherosclerosis of proximal RCA  Severe aortic stenosis by echo  Mild pulmonary hypertension , elevated LVEDP    RECOMMENDATIONS:  CT surgery evaluation    DISCHARGE AND FOLLOW UP:  The patient left the catheterization laboratory in stable condition  INDICATIONS:  --  Possible CAD: myocardial infarction without ST elevation (NSTEMI)  --  Cardiac: aortic valve disease  PROCEDURES PERFORMED    --  Right heart catheterization  --  Left heart catheterization without ventriculogram   --  Left coronary angiography  --  Right coronary angiography  --  Diagnostic myocardial fractional flow reserve  --  Inpatient  --  Cardiac output/Weston method  --  Oxygen saturation sampling   --  Oxygen saturation sampling  --  Mod Sedation Same Physician Initial 15min  --  Mod Sedation Same Physician Add 15min  --  Mod Sedation Same Physician Add 15min  --  Mod Sedation Same Physician Add 15min  --  Coronary Catheterization (w/ LHC)  --  Myocardial Flow Reserve  --  Right Heart Catheterization      PROCEDURE: The risks and alternatives of the procedures and conscious sedation were explained to the patient and informed consent was obtained  The patient was brought to the cath lab and placed on the table  The planned puncture sites  were prepped and draped in the usual sterile fashion  --  Right radial artery access  After performing an Kishor's test to verify adequate ulnar artery supply to the hand, the radial site was prepped  The puncture site was infiltrated with local anesthetic  The vessel was accessed using the  modified Seldinger technique, a wire was advanced into the vessel, and a sheath was advanced over the wire into the vessel  --  Right brachial vein access  The puncture site was infiltrated with local anesthetic  The vessel was accessed using the modified Seldinger technique, a wire was advanced into the vessel, and a sheath was advanced over the wire into the  vessel  --  Right heart catheterization  A [place] Eli catheter was advanced under fluoroscopic guidance into the right heart and intracardiac pressures were obtained  --  Left heart catheterization without ventriculogram  A catheter was advanced over a guidewire into the ascending aorta  After recording ascending aortic pressure, the catheter was advanced across the aortic valve and left ventricular  pressure was recorded  The catheter was pulled back across the aortic valve and into the ascending aorta and pullback pressures were obtained  --  Left coronary artery angiography  A catheter was advanced over a guidewire into the aorta and positioned in the left coronary artery ostium under fluoroscopic guidance  Angiography was performed  --  Right coronary artery angiography  A catheter was advanced over a guidewire into the aorta and positioned in the right coronary artery ostium under fluoroscopic guidance  Angiography was performed  --  Myocardial fractional flow reserve  Flow reserve was measured using a 0 014" pressure-monitoring guide-wire  Steady baseline values were obtained   Mean arterial pressure and mean distal coronary pressures were then obtained at maximum  hyperemia  --  Inpatient  --  Cardiac output/Weston method  Blood samples were obtained and measurements of arterial and venous oxygen saturations were made and cardiac output measurements were obtained using the Weston equation  --  Oxygen saturation sampling  Blood samples were obtained from multiple chambers and oxygen saturations measured and intra-cardiac shunt calculated  --  Oxygen saturation sampling  Blood samples were obtained from multiple chambers and oxygen saturations measured and intra-cardiac shunt calculated  --  Mod Sedation Same Physician Initial 15min  --  Mod Sedation Same Physician Add 15min  --  Mod Sedation Same Physician Add 15min  --  Mod Sedation Same Physician Add 15min  --  Coronary Catheterization (w/ [facility])  --  Myocardial Flow Reserve  --  Right Heart Catheterization  LESION INTERVENTION:    --  Steady baseline values were obtained  Mean arterial pressure and mean distal coronary pressures were then obtained at maximum hyperemia  Based on the results, the lesion was judged to be significant  LM was engaged using JL3 5mm guide  Mid LAD ifr positive 0 88  Wire was renormalized and average iFR 0 88 which is positive  There was pressure jump in ifR pull back in mid LAD  Proximal LAD iFR negative 0 96  PROCEDURE COMPLETION: The patient tolerated the procedure well and was discharged from the cath lab  TIMING: Test started at 10:37  Test concluded at 11:54  HEMOSTASIS: The sheath was removed  The site was compressed manually  Hemostasis  was obtained  The sheath was removed  The site was compressed with a Hemoband device  Hemostasis was obtained  MEDICATIONS GIVEN: Hydrocortisone 100mg/ml, 100 mg, IV, at 10:38  Versed (2mg/2ml), 1 mg, IV, at 10:44  Fentanyl (1OOmcg/2 ml),  25 mcg, IV, at 10:44  1% Lidocaine, 2 ml, subcutaneously, at 10:49  Nitroglycerin (200mcg/ml), 200 mcg, at 10:51   Heparin 1000 units/ml, 5,000 units, IV, at 10:55  Heparin 1000 units/ml, 2,500 units, IV, at 11:13  1% Lidocaine, 2 ml,  subcutaneously, at 11:31  CONTRAST GIVEN: 100 ml Visipaque-(320mg I /ml)  RADIATION EXPOSURE: Fluoroscopy time: 7 05 min  HEMODYNAMICS: Right heart catheterization done via right brachial vein access using swan catheter    VALVES:  AORTIC VALVE:   --  There was severe aortic stenosis by Echo    CORONARY VESSELS:   --  The coronary circulation is right dominant  --  Left main: The vessel was normal sized  Angiography showed no evidence of disease  --  LAD: The vessel was large sized  Proximal LAD 50% stenosis(seen in LE/REGIS CAU view) s/p negative iFR 0 96  Mid LAD has focal 50% stenosis at the origin of D2 s/p positive iFR 0 88 with brisk flow distally  D2 small vessel with proximal 50% stenosis  --  Circumflex: The vessel was large sized  Mid % occluded  OM2 fills from right to left collaterals  OM1 is medium caliber long vessel with luminal irreguilarities  --  RCA: The vessel was normal sized  Angiography showed mild atherosclerosis  Right to left collateral visualized filling OM    IMPRESSIONS:  Mid % occlusion and distally it fills from right to left collaterals  Proximal LAD 50% stenosis s/p negative iFR - 0 96  Focal mid LAD 50% stenosis s/p positive iFR - 0 88  Mild atherosclerosis of proximal RCA  Severe aortic stenosis by echo  Mild pulmonary hypertension , elevated LVEDP    RECOMMENDATIONS  CT surgery evaluation    DISPOSITION:  The patient left the catheterization laboratory in stable condition      Prepared and signed by    Misti Madrid MD  Signed 08/17/2020 12:24:40    Study diagram    Hemodynamic tables    Pressures:  Baseline  Pressures:  - HR: 59  Pressures:  - Rhythm:  Pressures:  -- Aortic Pressure (S/D/M): 170/80/111  Pressures:  -- Arterial (S/D/M): 174/70/106  Pressures:  -- Pulmonary Artery (S/D/M): 74/28/41  Pressures:  -- Pulmonary Capillary Wedge: 45/50/35  Pressures:  -- Right Atrium (a/v/M): /17/15  Pressures:  -- Right Ventricle (s/edp): 71/29/--    O2 Sats:  Baseline  O2 Sats:  - HR: 59  O2 Sats:  - Rhythm:  O2 Sats:  -- AO: 14  55  O2 Sats:  -- PA: 14  83    Outputs:  Baseline  Outputs:  -- CALCULATIONS: [age]  Outputs:  -- CALCULATIONS: Body Surface Area: 1 89  Outputs:  -- CALCULATIONS: Height in cm: 160 00  Outputs:  -- CALCULATIONS: Sex: Male  Outputs:  -- CALCULATIONS: Weight in k 20  Outputs:  -- OUTPUTS: CO by Weston: 3 67  Outputs:  -- OUTPUTS: Weston cardiac index: 1 94  Outputs:  -- OUTPUTS: Weston HR: 55 00  Outputs:  -- OUTPUTS: O2 consumption: 209 83  Outputs:  -- OUTPUTS: Vo2 Indexed: 110 90  Outputs:  -- RESISTANCES: Left ventricular stroke work: 63 21  Outputs:  -- RESISTANCES: Left Ventricular Stroke Work index: 33 41  Outputs:  -- RESISTANCES: Pulmonary vascular index (dsc): 247 46  Outputs:  -- RESISTANCES: Pulmonary vascular index (Wood Units): 3 09  Outputs:  -- RESISTANCES: Pulmonary vascular resistance (dsc): 130 78  Outputs:  -- RESISTANCES: Pulmonary vascular resistance (Wood Units): 1 64  Outputs:  -- RESISTANCES: PVR_SVR Ratio: 0 06  Outputs:  -- RESISTANCES: Right ventricular stroke work: 34 14  Outputs:  -- RESISTANCES: Right ventricular stroke work index: 18 05  Outputs:  -- RESISTANCES: Systemic vascular index (dsc): 3959 28  Outputs:  -- RESISTANCES: Systemic vascular index (Wood Units): 49 50  Outputs:  -- RESISTANCES: Systemic vascular resistance (dsc): 2092 53  Outputs:  -- RESISTANCES: Systemic vascular resistance (Wood Units): 26 16  Outputs:  -- RESISTANCES: Total pulmonary index (dsc): 1690 94  Outputs:  -- RESISTANCES: Total pulmonary index (Wood Units): 21 14  Outputs:  -- RESISTANCES: Total pulmonary resistance (dsc): 893 69  Outputs:  -- RESISTANCES: Total pulmonary resistance (Wood Units): 11 17  Outputs:  -- RESISTANCES: Total vascular index (Wood Units): 57 24  Outputs:  -- RESISTANCES: Total vascular resistance (dsc): 2419 49  Outputs:  -- RESISTANCES: Total vascular resistance (Wood Units): 30 25  Outputs:  -- RESISTANCES: Total vascular resistance index (dsc): 4577 92  Outputs:  -- RESISTANCES: TPR_TVR Ratio: 0 37  Outputs:  -- SHUNTS: Pulmonary flow: 3 67  Outputs:  -- SHUNTS: Qp Indexed: 1 94  Outputs:  -- SHUNTS: Qs Indexed: 1 94  Outputs:  -- SHUNTS: Systemic flow: 3 67       Meds/Allergies   all current active meds have been reviewed  No medications prior to admission  Assessment:  Principal Problem:    NSTEMI (non-ST elevated myocardial infarction) [facility])  Active Problems: Aortic stenosis    HTN (hypertension)    HLD (hyperlipidemia)    CAD (coronary artery disease)    S/P CABG (coronary artery bypass graft)    S/P AVR (aortic valve replacement)    Thrombocytopenia (HCC)    Hypocalcemia    Irritation of right eye    Counseling / Coordination of Care  Total floor / unit time spent today 20 minutes  Greater than 50% of total time was spent with the patient and / or family counseling and / or coordination of care  ** Please Note: Dragon 360 Dictation voice to text software may have been used in the creation of this document   **

## 2020-08-20 NOTE — PLAN OF CARE
Problem: PHYSICAL THERAPY ADULT  Goal: Performs mobility at highest level of function for planned discharge setting  See evaluation for individualized goals  Description: Treatment/Interventions: Functional transfer training, LE strengthening/ROM, Therapeutic exercise, Endurance training, Equipment eval/education, Bed mobility, Gait training, Spoke to nursing, OT  Equipment Recommended: Walker(at this time)       See flowsheet documentation for full assessment, interventions and recommendations  Note: Prognosis: Good  Problem List: Decreased strength, Decreased mobility, Decreased endurance, Impaired balance, Obesity, Pain  Assessment: Pt is 72 y o  male initially admitted to [facility] with hx of substernal back pain and heartburn with radiation to the neck and back and Dx of NSTEMI (non-ST elevated myocardial infarction); cardiac cath revealed CAD; al echocardiogram also demonstrated severe AS, moderate AI; pt was transferred to [facility] where he underwent Aortic valve replacement with a 21mm Morrison Inspiris Resilia pericardial tissue valve and coronary artery bypass grafting x 1 with saphenous vein graft to obtuse marginal 2 on 8/19/2020  Pt 's comorbidities affecting POC include: HTN and personal factors of: 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)  Pt's clinical presentation is currently unstable/unpredictable which is evident in ongoing telemetry monitoring while in a critical care unit w/ a-line in place, abnormal lab values being monitored/trending, CT in place and suppl O2 needs  Pt presents w/ postop discomfort and guarding, generalized weakness, incl decreased LE strength, decreased functional endurance, and min impaired balance w/ associated gait deviations requiring use of rw at this time  Will cont to follow pt in PT for progressive mobilization to address above functional deficits and to max level of (I), endurance, and safety   Otherwise, anticipate pt will return home upon D/C provided he cont improving w/ mobility skills, safety, and endurance and when medically cleared; home PT follow up may be considered pending progress; will follow  Barriers to Discharge: Decreased caregiver support     PT Discharge Recommendation: Return to previous environment with social support, Other (Comment)(r/o home PT pending progress)          See flowsheet documentation for full assessment

## 2020-08-20 NOTE — PROGRESS NOTES
Progress Note - Critical Care   Ene Browning 72 y o  male MRN: [id number]  Unit/Bed#: [facility] 419-01 Encounter: [id number]      Attending Physician: June German MD    24 Hour Events/HPI: POD # 1 s/p CABG and AVR  De-lined  Started on cardene for HTN (at 5 this AM)  Amio held for iodine allergy  Received 1L LR and 250cc albumin for low CI  Tmax 101 3- improved with scheduled tylenol, afebrile this AM  Does c/o R  Eye itchiness  ROS: Review of Systems  Review of systems was reviewed and negative unless stated above in HPI/24-hour events   ---------------------------------------------------------------------------------------------------------------------------------------------------------------------  Impressions:  1  CAD s/p CABG x1  2  Aortic stenosis s/p AVR  3  HTN  4  HLD    Plan:    Neuro:   · Pain controlled with: RTC tylenol, prn oxy  · Regulate sleep/wake cycle  · Delirium precautions  · CAM-ICU daily  · Trend neuro exam    HEENT:  · C/o R  Eye itchiness/scratchiness  · Has resolved completely at this point with no intervention  · If re-occurs, will fluorescein stain to r/o corneal abrasion  · PRN eye drops    CV:   · Cardiac infusions: Cardene, 5 mg/hour  · Wean cardene as able  · MAP goal > 65 and CI >2 2  · D/c Buffy Olu catheter  · D/c Arterial line after cardene off  · Rhythm: NSR  · Follow rhythm on telemetry  · Lopressor 25 mg PO BID  · Maintain epicardial pacing wires for POD pacing needs   · Continue to hold amio    Lung:   · Acute post-op pulmonary insufficiency; Requiring 4 liters via nasal cannla, secondary to poor inspiratory effort secondary to pain  Continue incentive spirometry/coughing/deep breathing exercises    Wean supplemental oxygen as tolerated for saturation > 90%  · Wean NC as tolerated  · Chlorhexidine ordered: yes  · Chest tube output remains persistently high; Continue chest tubes to suction today    GI:   · Continue PPI for stress ulcer prophylaxis  · Continue bowel regimen  · Trend abdominal exam and bowel function    FEN:   · Diuretic plan: Lasix 40 mg IV q QD  · K-dur 20 mEQ PO q QD  · Nutrition/diet plan: cardiac diet  · Replenish electrolytes with goals: K >4 0, Mag >2 0, and Phos >3 0    :   · Indwelling Caldwell present: yes   · D/c Caldwell  · Trend UOP and BUN/creat  · Strict I and O    ID:   · Trend temps and WBC count  · Maintain normothermia        Heme:   · Trend hgb and plts    Endo:   · Glycemic control plan: No history of diabetes: Glucose well-controlled  Discontinue continuous insulin infusion and add Insulin sliding scale coverage    Results from last 6 Months   Lab Units 08/19/20  0722   HEMOGLOBIN A1C % 5 5     MSK/Skin:  · Mobility goal: OOBTC  · PT consult: yes  · OT consult: yes  · Frequent turning and pressure off-loading  · Local wound care as needed    Disposition:  Transfer to SD level 1  ---------------------------------------------------------------------------------------------------------------------------------------------------------------------  Allergies:    Allergies   Allergen Reactions    Contrast Dye [Iodinated Diagnostic Agents] Anaphylaxis     Medications:   Scheduled Meds:  Current Facility-Administered Medications   Medication Dose Route Frequency Provider Last Rate    acetaminophen  650 mg Oral Q4H PRN Faheem Decker PA-C      acetaminophen  975 mg Oral Q8H Faheem Decker PA-C      aspirin  325 mg Oral Daily Faheem Decker PA-C      atorvastatin  80 mg Oral Daily With Dinner Faheem Decker PA-C      bisacodyl  10 mg Rectal Daily PRN Faheem Decker PA-C      cefazolin  1,000 mg Intravenous Q8H Faheem Decker PA-C 1,000 mg (08/20/20 0812)    chlorhexidine  15 mL Swish & Spit BID Faheem Decker PA-C      docusate sodium  100 mg Oral BID Faheem Decker PA-C      [facility] Hold] fluorescein sodium sterile  1 strip Right Eye Once Toñito Moise PA-C      fondaparinux  2 5 mg Subcutaneous Daily Faheem Decker PA-C      furosemide  40 mg Intravenous Daily Emeli Borrego PA-C      [facility] Hold] glycerin-hypromellose-  1 drop Both Eyes Q6H PRN Emi Pickering PA-C      [facility] Hold] glycerin-hypromellose-  1 drop Right Eye Q6H PRN Emeli Borrego PA-C      insulin lispro  1-5 Units Subcutaneous HS Toñito Moise PA-C      insulin lispro  1-6 Units Subcutaneous TID [facility] Jania Moise PA-C      lidocaine (cardiac)  100 mg Intravenous Q30 Min PRN Emeli Borrego PA-C      metoprolol tartrate  25 mg Oral Q12H [facility] Emeli Borrego PA-C      mupirocin  1 application Nasal B42X [facility] Emeli Borrego PA-C      ondansetron  4 mg Intravenous Q6H PRN Emeli Borrego PA-C      oxyCODONE  2 5 mg Oral Q4H PRN Emeli Borrego PA-C      oxyCODONE  5 mg Oral Q4H PRN Emeli Borrego PA-C      pantoprazole  40 mg Oral Daily Emeli Borrego PA-C      polyethylene glycol  17 g Oral Daily Emeli Borrego PA-C      potassium chloride  20 mEq Oral Daily Emeli Borrego PA-C      temazepam  15 mg Oral HS PRN Emeli Borrego PA-C       VTE Pharmacologic Prophylaxis: Fondaparinux (Arixtra)  VTE Mechanical Prophylaxis: sequential compression device    Continuous Infusions:   PRN Meds:  acetaminophen, 650 mg, Q4H PRN  bisacodyl, 10 mg, Daily PRN  [MAR Hold] glycerin-hypromellose-, 1 drop, Q6H PRN  [MAR Hold] glycerin-hypromellose-, 1 drop, Q6H PRN  lidocaine (cardiac), 100 mg, Q30 Min PRN  ondansetron, 4 mg, Q6H PRN  oxyCODONE, 2 5 mg, Q4H PRN  oxyCODONE, 5 mg, Q4H PRN  temazepam, 15 mg, HS PRN      Home Medications:   Prior to Admission medications    Not on File     ---------------------------------------------------------------------------------------------------------------------------------------------------------------------  Vitals:   Vitals:    08/20/20 0600 08/20/20 0800 08/20/20 0900 08/20/20 1100   BP:   133/80 112/67   BP Location:    Right arm   Pulse: 74  78 72   Resp: 19  18 18   Temp:    98 °F (36 7 °C) TempSrc:    Oral   SpO2: 100% 99% 98% 98%   Weight: 83 7 kg (184 lb 8 4 oz)      Height:         Arterial Line:  Arterial Line BP: 130/58  Arterial Line MAP (mmHg): 80 mmHg    Tele Rhythm: NSR This was personally reviewed by myself  Respiratory:  SpO2: SpO2: 98 %, SpO2 Activity: SpO2 Activity: At Rest, SpO2 Device: O2 Device: Nasal cannula  Nasal Cannula O2 Flow Rate (L/min): 4 L/min    Ventilator:  Respiratory    Lab Data (Last 4 hours)    None         O2/Vent Data (Last 4 hours)    None              Temperature: Temp (24hrs), Av 4 °F (37 4 °C), Min:98 °F (36 7 °C), Max:101 3 °F (38 5 °C)  Current: Temperature: 98 °F (36 7 °C)    Weights:   Weight (last 2 days)     Date/Time   Weight    20 0600   83 7 (184 53)            Body mass index is 32 69 kg/m²  Hemodynamic Monitoring:  PAP: PAP: 25/9, CVP: CVP (mean): 5 mmHg, CO: CO (L/min): 4 5 L/min, CI: CI (L/min/m2): 2 4 L/min/m2, SVR: SVR (dyne*sec)/cm5: 1261 (dyne*sec)/cm5  PAP: (24-36)/(9-18) 25/9  CO:  [3 3 L/min-5 2 L/min] 4 5 L/min  CI:  [1 7 L/min/m2-2 7 L/min/m2] 2 4 L/min/m2    Intake and Outputs:    Intake/Output Summary (Last 24 hours) at 2020 1330  Last data filed at 2020 1001  Gross per 24 hour   Intake 5078 63 ml   Output 4676 ml   Net 402 63 ml     I/O last 24 hours:   In: 5078 6 [I V :3053 6; IV Piggyback:1425]  Out: 4800 [Urine:3760; Drains:10; Blood:600; Chest Tube:306]      Chest tube Output:  Mediastinal tubes: 80 mL/8 hours  200 mL/24 hours   Pleural tubes: 30 mL/8 hours  80 mL/24 hours     Labs:  Results from last 7 days   Lab Units 20  0424 20  0031 20  1708  20  1519  20  0620 20  0530  08/15/20  1453   WBC Thousand/uL 13 53*  --   --   --   --   --  11 30* 13 81*   < > 10 90*   HEMOGLOBIN g/dL 12 9 13 1 13 0  --   --   --  15 2 13 8   < > 15 4   I STAT HEMOGLOBIN   --   --   --    < >  --    < >  --   --   --   --    HEMATOCRIT % 39 2 40 0 39 1  --   --   --  48 0 42 0   < > 48 0 HEMATOCRIT, ISTAT   --   --   --    < >  --    < >  --   --   --   --    PLATELETS Thousands/uL 123*  --  112*  --  134*  --  200 188   < > 234   NEUTROS PCT %  --   --   --   --   --   --   --  79*  --  84*   MONOS PCT %  --   --   --   --   --   --   --  8  --  4    < > = values in this interval not displayed  Results from last 7 days   Lab Units 08/20/20  0424 08/20/20  0031 08/19/20  2114 08/19/20  1708 08/19/20  1707 08/19/20  1559 08/19/20  1539  08/19/20  0706  08/15/20  1453   SODIUM mmol/L 138  --   --  142  --   --   --   --  140   < > 142   POTASSIUM mmol/L 4 2 4 3 4 1 4 0  --   --   --   --  3 7   < > 4 6   CHLORIDE mmol/L 106  --   --  110*  --   --   --   --  106   < > 103   CO2 mmol/L 25  --   --  22  --   --   --   --  28   < > 29   CO2, I-STAT mmol/L  --   --   --   --  23 26 25   < >  --   --   --    BUN mg/dL 11  --   --  13  --   --   --   --  15   < > 10   CREATININE mg/dL 0 67  --   --  0 81  --   --   --   --  1 15   < > 1 07   CALCIUM mg/dL 7 1*  --   --  7 3*  --   --   --   --  8 5   < > 10 1   ALK PHOS U/L  --   --   --   --   --   --   --   --   --   --  79   ALT U/L  --   --   --   --   --   --   --   --   --   --  29   AST U/L  --   --   --   --   --   --   --   --   --   --  20   GLUCOSE, ISTAT mg/dl  --   --   --   --  114 122 130   < >  --   --   --     < > = values in this interval not displayed  Baseline Creat: 1 0-1 1    Results from last 7 days   Lab Units 08/20/20  0424   MAGNESIUM mg/dL 2 8*          Results from last 7 days   Lab Units 08/19/20  0706 08/18/20 2024 08/18/20  1137  08/17/20 2123 08/16/20  0011   INR   --   --   --   --  0 99  --  1 11   PTT seconds 56* 53* 91*   < > 24   < > 80*    < > = values in this interval not displayed         Results from last 7 days   Lab Units 08/20/20  0424   PH ART  7 387   PCO2 ART mm Hg 39 4   PO2 ART mm Hg 109 0   HCO3 ART mmol/L 23 2   BASE EXC ART mmol/L -1 6   ABG SOURCE  Line, Arterial           Micro:   Blood Culture: No results found for: BLOODCX  Urine Culture: No results found for: URINECX  Sputum Culture: No components found for: SPUTUMCX  Wound Culure: No results found for: WOUNDCULT    Diagnositic Studies:  08/20/20 CXR: Pulmonary vascular congestion  Tubes and lines appropriately positioned  No hemo/pneumothorax noted  Bibasilar atelectasis  This was personally reviewed by myself in PACS   08/20/20 EKG: NSR  This was personally reviewed by myself  Nutrition:        Diet Orders   (From admission, onward)             Start     Ordered    08/20/20 0720  Diet Cardiovascular; Cardiac; Fluid Restriction 1800 ML  Diet effective now     Question Answer Comment   Diet Type Cardiovascular    Cardiac Cardiac    Other Restriction(s): Fluid Restriction 1800 ML    RD to adjust diet per protocol? Yes        08/20/20 0720                Physical Exam  Vitals signs reviewed  Constitutional:       General: He is not in acute distress  Appearance: Normal appearance  He is not diaphoretic  HENT:      Head: Normocephalic and atraumatic  Mouth/Throat:      Mouth: Mucous membranes are moist    Eyes:      Pupils: Pupils are equal, round, and reactive to light  Neck:      Musculoskeletal: Neck supple  Cardiovascular:      Rate and Rhythm: Normal rate and regular rhythm  Heart sounds: Normal heart sounds  No murmur  No friction rub  Comments: Sternum intact, dressed with acticoat  Chest tubes in place with dressing C/D/I  Pulmonary:      Effort: Pulmonary effort is normal       Breath sounds: No wheezing  Comments: Mild L  Basilar crackles  Abdominal:      General: Abdomen is flat  Bowel sounds are normal  There is no distension  Palpations: Abdomen is soft  Tenderness: There is no abdominal tenderness  There is no guarding  Musculoskeletal:      Right lower leg: Edema (trace) present  Left lower leg: Edema (trace) present  Skin:     General: Skin is warm and dry        Capillary Refill: Capillary refill takes less than 2 seconds  Neurological:      General: No focal deficit present  Mental Status: He is alert and oriented to person, place, and time  Invasive lines and devices: Invasive Devices     Central Venous Catheter Line            CVC Central Lines 08/19/20 Triple 1 day          Peripheral Intravenous Line            Peripheral IV 08/19/20 Right Hand 1 day          Line            Pacer Wires less than 1 day    Pacer Wires less than 1 day          Drain            Chest Tube 1 Anterior Mediastinal 32 Fr  less than 1 day    Chest Tube 2 Left Pleural 32 Fr  less than 1 day    Closed/Suction Drain Left Leg Bulb 15 Fr  less than 1 day              ---------------------------------------------------------------------------------------------------------------------------------------------------------------------  Code Status: Level 1 - Full Code    Care Time Delivered:   No Critical Care time spent     Collaborative bedside rounds performed with cardiac surgery attending, critical care attending and bedside RN      SIGNATURE: Mike Botello PA-C  DATE: August 20, 2020  TIME: 1:30 PM

## 2020-08-20 NOTE — UTILIZATION REVIEW
Continued Stay Review    Date: 08/20/2020                        Aortic stenosis, Non-Rheumatic, Coronary artery disease  S/P aortic valve replacement and coronary artery bypass grafting; POD # 1  Current Patient Class: Inpatient  Current Level of Care: Telemetry    HPI:65 y o  male initially admitted on 08/17/2020     SURGERY DATE: 8/19/2020  PROCEDURE:  1  Aortic valve replacement with a 21mm Morrison Inspiris Resilia pericardial tissue valve  2  Coronary artery bypass grafting x 1 with saphenous vein graft to obtuse marginal 2  ANESTHESIA General endotracheal anesthesia with transesophageal echocardiogram guidance,   FINDINGS:  1  Intraoperative transesophageal echocardiogram was not able to be performed, he might have a esophageal stenosis since the probe could not be advanced     2  Epiaortic/apicardial ultrasound showed less than 5 mm non-mobile plaque and severe AS, LVH, EF 60%  3  Aortic valve was trileaflet, heavily calcified  4  Coronary anatomy as described in coronary angiography  5  Final epiaortic/epicardial echocardiogram demonstrated prosthetic valve with normal function without evidence of perivalvular leak, no other changes  Assessment/Plan: Extubated to NC @ 4L  Started on IV cardene gtt for HTN  Amio held for iodine allergy  Given 1L LR & 250cc albumin post-op  Tmax 101 3 QHS, improved w/ scheduled Tylenol  Analgesia PRN  Triple CVC Line / Left radial A Line    Epicardial pacing wires A/v  Chest tube Output:  #1,2  Mediastinal tubes: 80 mL/8 hours             200 mL/24 hours   Pleural tubes: 30 mL/8 hours             80 mL/24 hours   VTE Pharmacologic Prophylaxis: Fondaparinux (Arixtra)  VTE Mechanical Prophylaxis: sequential compression device  Pertinent Labs/Diagnostic Results:   Results from last 7 days   Lab Units 08/18/20  1108   SARS-COV-2  Negative     Results from last 7 days   Lab Units 08/20/20  0424 08/20/20  0031 08/19/20  1708 08/19/20  1707 08/19/20  1559  08/19/20  1519 08/19/20  0620 08/18/20  0530  08/15/20  1453   WBC Thousand/uL 13 53*  --   --   --   --   --   --   --  11 30* 13 81*   < > 10 90*   HEMOGLOBIN g/dL 12 9 13 1 13 0  --   --   --   --   --  15 2 13 8   < > 15 4   I STAT HEMOGLOBIN g/dl  --   --   --  12 2 9 2*   < >  --    < >  --   --   --   --    HEMATOCRIT % 39 2 40 0 39 1  --   --   --   --   --  48 0 42 0   < > 48 0   HEMATOCRIT, ISTAT %  --   --   --  36* 27*   < >  --    < >  --   --   --   --    PLATELETS Thousands/uL 123*  --  112*  --   --   --  134*  --  200 188   < > 234   NEUTROS ABS Thousands/µL  --   --   --   --   --   --   --   --   --  10 96*  --  9 28*    < > = values in this interval not displayed  Results from last 7 days   Lab Units 08/20/20  0424 08/20/20  0031 08/19/20  2114 08/19/20  1708 08/19/20  1707 08/19/20  1559 08/19/20  1539 08/19/20  1524 08/19/20  1453  08/19/20  0706 08/18/20  0554 08/17/20  0453   SODIUM mmol/L 138  --   --  142  --   --   --   --   --   --  140 141 141   POTASSIUM mmol/L 4 2 4 3 4 1 4 0  --   --   --   --   --   --  3 7 3 8 4 2   CHLORIDE mmol/L 106  --   --  110*  --   --   --   --   --   --  106 109* 106   CO2 mmol/L 25  --   --  22  --   --   --   --   --   --  28 27 26   CO2, I-STAT mmol/L  --   --   --   --  23 26 25 27 25   < >  --   --   --    ANION GAP mmol/L 7  --   --  10  --   --   --   --   --   --  6 5 9   BUN mg/dL 11  --   --  13  --   --   --   --   --   --  15 20 22   CREATININE mg/dL 0 67  --   --  0 81  --   --   --   --   --   --  1 15 0 89 1 12   EGFR ml/min/1 73sq m 101  --   --  93  --   --   --   --   --   --  66 90 69   CALCIUM mg/dL 7 1*  --   --  7 3*  --   --   --   --   --   --  8 5 8 7 9 0   CALCIUM, IONIZED, ISTAT mmol/L  --   --   --   --  1 09* 1 14 0 90* 0 95* 0 95*   < >  --   --   --    MAGNESIUM mg/dL 2 8*  --   --   --   --   --   --   --   --   --   --   --   --     < > = values in this interval not displayed       Results from last 7 days   Lab Units 08/15/20  7601 AST U/L 20   ALT U/L 29   ALK PHOS U/L 79   TOTAL PROTEIN g/dL 8 2   ALBUMIN g/dL 4 3   TOTAL BILIRUBIN mg/dL 0 50     Results from last 7 days   Lab Units 08/20/20  1057 08/20/20  0605 08/20/20  0401 08/20/20  0157 08/20/20  0002 08/19/20  2211 08/19/20  1959 08/19/20  1751 08/19/20  1658   POC GLUCOSE mg/dl 152* 110 135 136 120 107 128 114 105     Results from last 7 days   Lab Units 08/20/20  0424 08/19/20  1708 08/19/20  0706 08/18/20  0554 08/17/20  0453 08/15/20  1453   GLUCOSE RANDOM mg/dL 123 116 98 86 126 113     Results from last 7 days   Lab Units 08/19/20  0722   HEMOGLOBIN A1C % 5 5   EAG mg/dl 111      Results from last 7 days   Lab Units 08/20/20  0424   PH ART  7 387   PCO2 ART mm Hg 39 4   PO2 ART mm Hg 109 0   HCO3 ART mmol/L 23 2   BASE EXC ART mmol/L -1 6   O2 CONTENT ART mL/dL 18 9   O2 HGB, ARTERIAL % 97 3*   ABG SOURCE  Line, Arterial     Results from last 7 days   Lab Units 08/19/20  1707 08/19/20  1559 08/19/20  1539  08/19/20  1453  08/19/20  1256   PH, SHERIE I-STAT   --   --   --   --  7 403*  --  7 335   PCO2, SHERIE ISTAT mm HG  --   --   --   --  38 2*  --  46 1   PO2, SHERIE ISTAT mm HG  --   --   --   --  42 0  --  41 0   HCO3, SHERIE ISTAT mmol/L  --   --   --   --  23 8*  --  24 6   I STAT BASE EXC mmol/L -3* 0 0   < > -1   < > -2   I STAT O2 SAT % 98* 100* 100*   < > 78   < > 73   ISTAT PH ART  7 382 7 417 7 439   < >  --    < >  --    I STAT ART PCO2 mm HG 36 3 38 8 35 6*   < >  --    < >  --    I STAT ART PO2 mm  0 352 0* 373 0*   < >  --    < >  --    I STAT ART HCO3 mmol/L 21 6* 25 0 24 1   < >  --    < >  --     < > = values in this interval not displayed       Results from last 7 days   Lab Units 08/16/20  1830 08/16/20  1543 08/16/20  1035 08/16/20  0634 08/16/20  0011   TROPONIN I ng/mL 13 16* 9 78* 12 79* 11 49* 9 67*     Results from last 7 days   Lab Units 08/19/20  0706 08/18/20 2024 08/18/20  1137  08/17/20  2123  08/16/20  0011   PROTIME seconds  --   --   --   --  13 1 --  13 8   INR   --   --   --   --  0 99  --  1 11   PTT seconds 56* 53* 91*   < > 24   < > 80*    < > = values in this interval not displayed       Results from last 7 days   Lab Units 08/15/20  1453   NT-PRO BNP pg/mL 804*     Results from last 7 days   Lab Units 08/18/20  1116   CLARITY UA  Clear   COLOR UA  Yellow   SPEC GRAV UA  1 025   PH UA  6 0   GLUCOSE UA mg/dl Negative   KETONES UA mg/dl Negative   BLOOD UA  Negative   PROTEIN UA mg/dl Negative   NITRITE UA  Negative   BILIRUBIN UA  Negative   UROBILINOGEN UA E U /dl 1 0   LEUKOCYTES UA  Negative     Vital Signs: /67 (BP Location: Right arm)   Pulse 72   Temp 98 °F (36 7 °C) (Oral)   Resp 18   Ht 5' 3" (1 6 m)   Wt 83 7 kg (184 lb 8 4 oz)   SpO2 98%   BMI 32 69 kg/m²     Medications:   Scheduled Medications:  acetaminophen, 975 mg, Oral, Q8H  aspirin, 325 mg, Oral, Daily  atorvastatin, 80 mg, Oral, Daily With Dinner  cefazolin, 1,000 mg, Intravenous, Q8H  chlorhexidine, 15 mL, Swish & Spit, BID  docusate sodium, 100 mg, Oral, BID  [MAR Hold] fluorescein sodium sterile, 1 strip, Right Eye, Once  fondaparinux, 2 5 mg, Subcutaneous, Daily  furosemide, 40 mg, Intravenous, Daily  insulin lispro, 1-5 Units, Subcutaneous, HS  insulin lispro, 1-6 Units, Subcutaneous, TID AC  metoprolol tartrate, 25 mg, Oral, Q12H ARVIND  mupirocin, 1 application, Nasal, O87P ARVIND  pantoprazole, 40 mg, Oral, Daily  polyethylene glycol, 17 g, Oral, Daily  potassium chloride, 20 mEq, Oral, Daily    Continuous IV Infusions:     PRN Meds:  acetaminophen, 650 mg, Oral, Q4H PRN  bisacodyl, 10 mg, Rectal, Daily PRN  [MAR Hold] glycerin-hypromellose-, 1 drop, Both Eyes, Q6H PRN  [MAR Hold] glycerin-hypromellose-, 1 drop, Right Eye, Q6H PRN  lidocaine (cardiac), 100 mg, Intravenous, Q30 Min PRN  ondansetron, 4 mg, Intravenous, Q6H PRN  oxyCODONE, 2 5 mg, Oral, Q4H PRN  oxyCODONE, 5 mg, Oral, Q4H PRN  temazepam, 15 mg, Oral, HS PRN    Discharge Plan: [facility]    Network Utilization Review Department  [email or web address]  ATTENTION: Please call with any questions or concerns to [phone] and carefully listen to the prompts so that you are directed to the right person  All voicemails are confidential   Valeta Pastel all requests for admission clinical reviews, approved or denied determinations and any other requests to dedicated fax number below belonging to the campus where the patient is receiving treatment   List of dedicated fax numbers for the Facilities:  [address] DENIALS (Administrative/Medical Necessity) [phone]   [address] (Maternity/NICU/Pediatrics) [phone]   Jared Saini [phone]   Jony Cavanaugh [phone]   [facility] [phone]   Cheryl Ortiz [phone]   [address] [place] [address] [phone]   [facility]  [phone]   [address] [facility]  [address] [facility] [place] [address] [facility] [phone]

## 2020-08-20 NOTE — TELEPHONE ENCOUNTER
Pt had procedure done CORONARY ARTERY BYPASS GRAFT (CABG) WITH REPLACEMENT VALVE AORTIC (AVR); EV [1140]  Pt is being discharged on 8/23/20 and needs a 2 weeks hos/fup  Pt was in [facility]

## 2020-08-20 NOTE — PROGRESS NOTES
Progress Note - Cardiothoracic Surgery   Ray Prabhakar 72 y o  male MRN: [id number]  Unit/Bed#: [facility] 415-01 Encounter: [id number]  Aortic stenosis, Non-Rheumatic, Coronary artery disease  S/P aortic valve replacement and coronary artery bypass grafting; POD # 1    24 Hour Events: Extubated to NC  Delined  Started on cardene for HTN, now at 5  Amio held for iodine allergy  Given 1L LR & 250cc albumin post-op  Tmax 101 3 QHS, improved w/ scheduled Tylenol, afebrile this AM  No other events  C/o right eye irritation described as "itrchy & gritty"  Tolerating sips & chips  (+) flatus, (-) BM, last BM while in hospital prior to sx  OOB to chair  Pain controlled w/ PRN medication      Medications:   Scheduled Meds:  Current Facility-Administered Medications   Medication Dose Route Frequency Provider Last Rate    acetaminophen  650 mg Rectal Q4H PRN Ml Filter, PA-C      acetaminophen  975 mg Oral [address], PA-C      aspirin  325 mg Oral Daily Ml Filter, PA-C      atorvastatin  80 mg Oral Daily With [employer] Courtney, PA-C      bisacodyl  10 mg Rectal Daily PRN Ml Filter, PA-C      calcium chloride  1 g Intravenous Once Ml Filter, PA-C      cefazolin  1,000 mg Intravenous Q8H Ml Filter, PA-C Stopped (08/20/20 0400)    chlorhexidine  15 mL Swish & Spit BID Ml Filter, PA-C      fentanyl citrate (PF)  50 mcg Intravenous Once Ml Filter, PA-C      fentanyl citrate (PF)  50 mcg Intravenous Q1H PRN Ml Filter, PA-C      fondaparinux  2 5 mg Subcutaneous Daily Lux Courtney, PA-C      furosemide  40 mg Intravenous Q6H PRN Ml Filter, PA-C      insulin regular (HumuLIN R,NovoLIN R) infusion  0 3-21 Units/hr Intravenous Titrated Ml Filter, PA-C Stopped (08/19/20 1802)    lactated ringers  500 mL Intravenous Q30 Min PRN Ml Filter, PA-C 500 mL (08/19/20 1717)    lidocaine (cardiac)  100 mg Intravenous Q30 Min PRN Ml Filter, PA-C      mupirocin  1 application Nasal F65F Orrspelsv 7 CHELY Valdez      niCARdipine  2 5-15 mg/hr Intravenous Titrated Youngblood Griffjoao, PA-C 5 mg/hr (08/20/20 0630)    ondansetron  4 mg Intravenous Q6H PRN Youngblood Griffes, PA-C      oxyCODONE  2 5 mg Oral Q4H PRN Youngblood Griffes, PA-C      oxyCODONE  5 mg Oral Q4H PRN Youngblood Griffes, PA-C      pantoprazole  40 mg Oral Daily Youngblood Griffes, PA-C      polyethylene glycol  17 g Oral Daily Youngblood Griffes, PA-C      potassium chloride  20 mEq Intravenous Once PRN Youngblood Griffes, PA-C      potassium chloride  20 mEq Intravenous Q1H PRN Youngblood Griffes, PA-C      potassium chloride  20 mEq Intravenous Q30 Min PRN Youngblood Griffes, PA-C      sodium chloride  20 mL/hr Intravenous Continuous Youngblood Griffes, PA-C 20 mL/hr (08/19/20 1716)     Continuous Infusions:insulin regular (HumuLIN R,NovoLIN R) infusion, 0 3-21 Units/hr, Last Rate: Stopped (08/19/20 1802)  niCARdipine, 2 5-15 mg/hr, Last Rate: 5 mg/hr (08/20/20 0630)  sodium chloride, 20 mL/hr, Last Rate: 20 mL/hr (08/19/20 1716)      PRN Meds:   acetaminophen    bisacodyl    fentanyl citrate (PF)    furosemide    lactated ringers    lidocaine (cardiac)    ondansetron    oxyCODONE    oxyCODONE    potassium chloride    potassium chloride    potassium chloride    Vitals:   Vitals:    08/20/20 0300 08/20/20 0400 08/20/20 0500 08/20/20 0600   BP:       BP Location:       Pulse: 74 70 74 74   Resp: (!) 24 21 21 19   Temp:  99 1 °F (37 3 °C)     TempSrc:  Probe     SpO2: 98% 98% 98% 100%   Weight:    83 7 kg (184 lb 8 4 oz)   Height:         Telem - NSR, 73    Bp x24hrs: 120-140/50-60    Hemodynamics PRIOR TO DELINING:  PAP: (24-36)/(9-18) 25/9  CO:  [3 3 L/min-5 2 L/min] 4 5 L/min  CI:  [1 7 L/min/m2-2 7 L/min/m2] 2 4 L/min/m2   CVP: 5    Respiratory:   SpO2: SpO2: 100 %, SpO2 Activity: SpO2 Activity: At Rest, SpO2 Device: O2 Device: Nasal cannula; 4 LPM    Intake/Output:   I/O       08/18 0701 - 08/19 0700 08/19 0701 - 08/20 0700    P  O  1930     I V  (mL/kg) 164 (2) 2928 8 (35) NG/GT  0    IV Piggyback  1375    Cell Saver  600    Total Intake(mL/kg) 2094 (25 2) 4903 8 (58 6)    Urine (mL/kg/hr) 1100 (0 6) 3310 (1 6)    Drains  10    Blood  600    Chest Tube  280    Total Output 1100 4200    Net +994 +703 8          Unmeasured Urine Occurrence 1 x         UOP - 960cc/8hrs; 3310cc/24hrs    KACIE : 0cc/8hrs; 10cc/24hrs    Chest tube Output:    Mediastinal tubes: 80 mL/8 hours  200 mL/24 hours   Pleural tubes: 30 mL/8 hours  80 mL/24 hours     Weights:   Weight (last 2 days)     Date/Time   Weight    08/20/20 0600   83 7 (184 53)            Admit Weight: 83kg - up 0 7kg form admission weight    Results:   Results from last 7 days   Lab Units 08/20/20 0424 08/20/20  0031 08/19/20  1708  08/19/20  1519  08/19/20  0620 08/18/20  0530   WBC Thousand/uL 13 53*  --   --   --   --   --  11 30* 13 81*   HEMOGLOBIN g/dL 12 9 13 1 13 0  --   --   --  15 2 13 8   I STAT HEMOGLOBIN   --   --   --    < >  --    < >  --   --    HEMATOCRIT % 39 2 40 0 39 1  --   --   --  48 0 42 0   HEMATOCRIT, ISTAT   --   --   --    < >  --    < >  --   --    PLATELETS Thousands/uL 123*  --  112*  --  134*  --  200 188    < > = values in this interval not displayed  Results from last 7 days   Lab Units 08/20/20  0424 08/20/20  0031 08/19/20  2114 08/19/20  1708 08/19/20  1707  08/19/20  0706   SODIUM mmol/L 138  --   --  142  --   --  140   POTASSIUM mmol/L 4 2 4 3 4 1 4 0  --   --  3 7   CHLORIDE mmol/L 106  --   --  110*  --   --  106   CO2 mmol/L 25  --   --  22  --   --  28   CO2, I-STAT mmol/L  --   --   --   --  23   < >  --    BUN mg/dL 11  --   --  13  --   --  15   CREATININE mg/dL 0 67  --   --  0 81  --   --  1 15   GLUCOSE, ISTAT mg/dl  --   --   --   --  114   < >  --    CALCIUM mg/dL 7 1*  --   --  7 3*  --   --  8 5    < > = values in this interval not displayed       Results from last 7 days   Lab Units 08/19/20  0706 08/18/20 2024 08/18/20  1137  08/17/20  2123  08/16/20  0011   INR   --   --   --   -- 0 99  --  1 11   PTT seconds 56* 53* 91*   < > 24   < > 80*    < > = values in this interval not displayed  Studies:  CXR: stable study, stable central pulm vasc congestion, lines in place, no gross PTX appreciated, bibasilar atelectasis noted w/ slightly worse left opacity (likely effusion, CT already in place)  EKG: NSR, rate 71, QTc 462, inverted T waves in aVR    I have personally reviewed pertinent reports  and I have personally reviewed pertinent films in PACS    Invasive Lines/Tubes:  Invasive Devices     Central Venous Catheter Line            CVC Central Lines 08/19/20 Triple less than 1 day          Peripheral Intravenous Line            Peripheral IV 08/19/20 Right Hand less than 1 day          Arterial Line            Arterial Line 08/19/20 Left Radial less than 1 day          Line            Pacer Wires less than 1 day    Pacer Wires less than 1 day          Drain            Chest Tube 1 Anterior Mediastinal 32 Fr  less than 1 day    Chest Tube 2 Left Pleural 32 Fr  less than 1 day    Closed/Suction Drain Left Leg Bulb 15 Fr  less than 1 day    Urethral Catheter Non-latex; Temperature probe 16 Fr  less than 1 day                Physical Exam:    HEENT/NECK:  PERRLA  No jugular venous distention  Right cornea clear w/o gross debris  EOMI  Cardiac: Regular rate and rhythm and No murmurs/rubs/gallops  Pulmonary:  good inspiratory effort, equal expansion b/l, clear right posterior lung sounds, basilar inspiraotry crackles to left lung base  Abdomen:  Non-tender, Non-distended and Hypo-active bowel sounds  Incisions: Sternum is stable  Incision dressed with Acticoat  No erythema or drainage and Saphenectomy incision dressed with Acticoat  No erythema or drainage  Extremities: Extremities warm/dry and Trace edema B/L  Neuro: Alert and oriented X 3, Sensation is grossly intact and No focal deficits  Skin: Warm/Dry, without rashes or lesions      Assessment:  Principal Problem:    NSTEMI (non-ST elevated myocardial infarction) [facility])  Active Problems: Aortic stenosis    HTN (hypertension)    HLD (hyperlipidemia)    CAD (coronary artery disease)    S/P CABG (coronary artery bypass graft)    S/P AVR (aortic valve replacement)       Aortic stenosis, Non-Rheumatic, Coronary artery disease  S/P aortic valve replacement and coronary artery bypass grafting; POD # 1    Plan:    1  Cardiac:   Cardiac infusions: Cardene, 5 mg/hour  Cardiac index > 2 2  Rosie Eli catheter removed   D/C Arterial line once cardene off  NSR; HR/BP well-controlled  Lopressor, 25mg PO BID  Continue ASA and Statin therapy  Maintain epicardial pacing wires for PRN pacing  Maintain central IV access today for blood draws/cardene infusion  Continue DVT prophylaxis  Consult cardiology for postoperative medical management  Hold amio for iodine allergy    2  Pulmonary:   Extubated  CXR findings: as above  Acute post-op pulmonary insufficiency; Requiring 4 liters via nasal cannla, secondary to atelectasis, pulmonary vascular congestion and poor inspiratory effort secondary to pain  Continue incentive spirometry/coughing/deep breathing exercises  Wean supplemental oxygen as tolerated for saturation > 90%  Chest tube output remains persistently high; Continue chest tubes to suction today    3  Renal:   Intake/Output net: (+)703 8 mL/24 hours  Post op volume overload: Add Lasix, 40 mg IV QD  Add Potassium supplementation, 20 mEq QD  Post op Creatinine stable; Follow up labs prn  Discontinue potassium replacement scales  Discontinue maravilla catheter    4  Neuro:  Neurologically intact; No active issues  Saline flush to right eye this AM then PRN eye gtts for irritation, reassess in PM  Incisional pain well-controlled  Continue Tylenol, 975 mg PO q 8, standing dose  Continue Oxycodone, 2 5 to 5 mg PO q 4 hours prn painContinue prn Percocet    5   GI:  Tolerating TLC 2 3 gm sodium diet  Maintain 1800 mL daily fluid restriction   Continue stool softeners and prn suppository  Continue GI prophylaxis    6  Endo:   No history of diabetes: Glucose well-controlled  Discontinue continuous insulin infusion and add Insulin sliding scale coverage    7  Hematology: Thrombocytopenia, no active bleeding  Leukocytosis, afebrile this AM, likely reactive, continue to monitor temp today  Hypocalcemia    8   Disposition:  Transfer from ICU to Stepdown, level 1    VTE Pharmacologic Prophylaxis: Fondaparinux (Arixtra)  VTE Mechanical Prophylaxis: sequential compression device    Collaborative rounds completed with SIMONE Taylor  and with the bedside nurse    SIGNATURE: Yulisa Bar PA-C  DATE: August 20, 2020  TIME: 6:57 AM

## 2020-08-20 NOTE — OCCUPATIONAL THERAPY NOTE
Occupational Therapy Evaluation     Patient Name: Federico Heath  VSTWP'G Date: 8/20/2020  Problem List  Principal Problem:    NSTEMI (non-ST elevated myocardial infarction) [facility])  Active Problems: Aortic stenosis    HTN (hypertension)    HLD (hyperlipidemia)    CAD (coronary artery disease)    S/P CABG (coronary artery bypass graft)    S/P AVR (aortic valve replacement)    Thrombocytopenia (HCC)    Hypocalcemia    Irritation of right eye    Past Medical History  Past Medical History:   Diagnosis Date    HLD (hyperlipidemia)     HTN (hypertension)      Past Surgical History  Past Surgical History:   Procedure Laterality Date    CORONARY ARTERY BYPASS GRAFT WITH VALVE REPLACEMENT Left 8/19/2020    Procedure: CORONARY ARTERY BYPASS GRAFT (CABG) x 1; Left EVH/SVG to OM2; AVR with Morrison 21 mm Inspiris Tissue Valve;  Surgeon: Nahomy Landrum MD;  [facility]: [facility];  Service: Cardiac Surgery    KNEE ARTHROSCOPY Left     ROTATOR CUFF REPAIR Left          08/20/20 0905   Note Type   Note type Eval/Treat   Restrictions/Precautions   Weight Bearing Precautions Per Order No   Other Precautions Cardiac/sternal;Multiple lines;Telemetry; Fall Risk;O2  (4L NC)   Pain Assessment   Pain Assessment Tool 0-10   Pain Score 2   Pain Location/Orientation Location: Chest   Hospital Pain Intervention(s) Repositioned; Ambulation/increased activity; Emotional support   Home Living   Type of [address] One level; Laundry in basement   [employer]   Prior Function   Level of [place] Independent with ADLs and functional mobility   Lives With Spouse;Daughter  (11 yo and 24 yo dtr)   Receives Help From Family   ADL Assistance Independent   IADLs Independent   Falls in the last 6 months 0   Vocational Full time employment   Lifestyle   Autonomy pta pt reports I in ADls/IADLs/functional mobility   Reciprocal Relationships spouse has MS, he occasionally assists her; [facility]   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[id number] [facility] enjoys music; plays the [employer] and [employer]   Psychosocial   Psychosocial (WDL) WDL   Subjective   Subjective "I have a little bit of discomofort in the chest but not too bad"   ADL   Where Assessed Chair   Eating Assistance 5  Supervision/Setup   Grooming Assistance 5  Supervision/Setup   [address] 5  Supervision/Setup    [facility] to Stand 4  Minimal assistance   Additional items Assist x 1; Increased time required   Stand to Sit 4  Minimal assistance   Additional items Assist x 1; Increased time required   Functional Mobility   Functional Mobility 4  Minimal assistance   Additional items Rolling walker   Balance   Static Sitting Fair   Dynamic Sitting Fair -   Static Standing Poor +   Dynamic Standing Poor +   Ambulatory Poor +   Activity Tolerance   Activity Tolerance Patient tolerated treatment well   Medical Staff Made Aware PT Keshav   Nurse Made Aware okay to see per RN Rivera Anne   RUE Assessment   RUE Assessment WFL   LUE Assessment   LUE Assessment WFL   Hand Function   Gross Motor Coordination Functional   Fine Motor Coordination Functional   Cognition   Overall Cognitive Status WFL   Arousal/Participation Cooperative   Attention Within functional limits   Orientation Level Oriented X4   Memory Within functional limits   Following Commands Follows multistep commands with increased time or repetition   Assessment   Limitation Decreased ADL status; Decreased endurance;Decreased high-level ADLs   Prognosis Good   Assessment Pt is a 72 y o  YO  male admitted to [facility] on 8/17/2020 w/ NSTEMI s/p CABG x 1/AVR on 8/19/20   Pt  has a past medical history of HLD (hyperlipidemia) and HTN (hypertension)    Pt with active OT orders and ambulate  orders    Pt resides in a ranch style home with spouse and two teenage dtrs  Pt reports his older dtr is moving into college tomorrow    Pt was I w/  ADLS and IADLS, (+) drove, & required no use of DME PTA  Currently pt is supervision for [facility] ADls and Min A for [facility] ADls and functional mobility  Pt is limited at this time 2*: endurance, activity tolerance, functional mobility, unsupportive home environment and decreased I w/ ADLS/IADLS  The following Occupational Performance Areas to address include: grooming, bathing/shower, toilet hygiene, dressing, functional mobility and household maintenance  Based on the aforementioned OT evaluation, functional performance deficits, and assessments, pt has been identified as a high complexity evaluation  From OT standpoint, anticipate d/c home with family support  Pt to continue to benefit from acute immediate OT services to address the following goals 1-2 sessions to  w/in 3-5 days: Keturah Shultz Goals   Patient Goals go home   STG Time Frame 3-5   Plan   Treatment Interventions Patient/family training;Cardiac education; Compensatory technique education   Goal Expiration Date 20   OT Treatment Day 1   OT Frequency 1-2x/wk   Additional Treatment Session   Start Time 3865   End Time 09   Treatment Assessment Pt participated in additional OT session focusing on cardiac education  Provided pt with Recovering After Cardiac Surgery packet and educated pt regarding; sternal precautions, cardiac precautions, lifiting restrictions, safe activity engagement, energy conservation, lifestyle modifications, stress management and cardiac rehabilitation programs  Pt's questions were addressed after discussion of the packet  Provided pt with contact information for OT department if questions arrise   Pt is limited by decreased endurance and decreased ability to complete higher level ADLs, however his spouse will be home and able to assist as needed upon d/c  Rec pt cont participation in self care after s/u w/ nrsg staff and cont mobility w/ non OT staff while in the hospital  Pt denies any questions or concerns from an OT standpoint at this time  Rec pt return home w/ increased family support upon d/c  D/C OT  Recommendation   OT Discharge Recommendation Return to previous environment with social support   OT - OK to Discharge Yes   Modified [place] Scale   Modified [place] Scale 4     Goals:    1 ) Pt/family will participate in cardiac education w/ G attn and carryover during functional/leisure activities      2 ) Pt will demonstrate 100% carryover of energy conservation techniques t/o functional I/ADL/leisure tasks w/o cues s/p skilled education      Maryanne Schirmer, MS, OTR/L

## 2020-08-20 NOTE — DISCHARGE INSTRUCTIONS
Sternal Precautions   WHAT YOU NEED TO KNOW:   What are sternal precautions? Sternal precautions are used to help protect your sternum (breastbone) after open chest surgery  Wires are placed during surgery to hold the sternum together as it heals  Sternal precautions help prevent the wires from cutting through the sternum  The precautions also help prevent the sternum from coming apart from an injury, and prevent pain and bleeding  You may need to use the precautions for up to 12 weeks after surgery  Your surgeon will give you specific instructions based on the type of surgery you had  It is important to follow the instructions carefully  An injury to the healing sternum can be life-threatening  What are some general sternal precautions? Start slowly and do more as you get stronger  Pain medicine might make it harder for you to know when to slow down or be careful  Stop immediately if you hear a crunch or pop in your sternum  · Protect your sternum  Hug a pillow to your chest or cross your arms over your chest when you laugh, sneeze, or cough  · Be careful when you get into or out of a chair or bed  Hug a pillow or cross your arms when you stand or sit  Do not twist as you move  Use only your legs to sit and stand  You may need to use a raised toilet seat if you have trouble standing up without using your arms  Your healthcare provider may teach you to use your elbow for support as you move from lying to sitting  · Ask when you may take a bath or shower  You may need to use a bath chair if you have trouble getting into or out of the tub  Do not use a grab bar  · Do not lift or carry anything heavier than 10 pounds  For example, a gallon of milk weighs 8 pounds  · Keep your arms down as much as possible  Do not put your arms out to the side, behind you, or over your head  Do not let anyone pull your arms to help you move or dress  Do not reach for items  · Do not push or pull anything  Examples include a car door or a vacuum   · Do not drive while you are healing  Your surgeon will tell you when it is safe for you to start driving again  How do I care for my surgery wound? Always wash your hands before you care for your wound  Wash your wound as directed  Do not rub the wound as you wash or dry the area  Pat the area dry with a clean towel  Change the bandages as directed and when they get wet or dirty  Do not smoke:  Nicotine can damage blood vessels and make it more difficult to heal  Do not use e-cigarettes or smokeless tobacco in place of cigarettes or to help you quit  They still contain nicotine  Ask your healthcare provider for information if you currently smoke and need help quitting  Call 911 for any of the following:   · You have sudden pain in your sternum and hear a crunch or pop  · You have bleeding that does not stop even after you apply pressure for 5 minutes  When should I seek immediate care? · You hear crunching or grinding in your sternum  · You have signs of an infection, such as a fever, red or warm skin, or pus in the surgery wound  When should I contact my healthcare provider? · You continue to feel pain, even after you take your pain medicine  · You have new or worsening pain, or any pain with movement  · You have questions or concerns about your condition or care  CARE AGREEMENT:   You have the right to help plan your care  Learn about your health condition and how it may be treated  Discuss treatment options with your caregivers to decide what care you want to receive  You always have the right to refuse treatment  The above information is an  only  It is not intended as medical advice for individual conditions or treatments  Talk to your doctor, nurse or pharmacist before following any medical regimen to see if it is safe and effective for you    © 2017 [employer] Information is for End User's use only and may not be sold, redistributed or otherwise used for commercial purposes  All illustrations and images included in CareNotes® are the copyrighted property of [employer]  or Isaiah Brand  Coronary Artery Bypass Graft   WHAT YOU NEED TO KNOW:   A coronary artery bypass graft (CABG) is open heart surgery to clear blocked arteries in your heart  CABG surgery improves blood flow to your heart by bypassing (sending blood around) the blocked part of an artery  This restores blood flow to your heart and helps prevent a heart attack  DISCHARGE INSTRUCTIONS:   Call 911 for any of the following:   · You feel lightheaded, short of breath, and have chest pain  · You cough up blood  · You have a fast heartbeat that flutters  · You feel like you are going to faint  Seek care immediately if:   · Your arm or leg feels warm, tender, and painful  It may look swollen and red  · You have numbness or tingling in your arms or legs  · You have a severe headache  Contact your healthcare provider if:   · You have a fever higher than 101°F (38 4°C)  · You have gained 2 pounds in 24 hours  · Your wound is red, swollen, or draining pus  · Your signs and symptoms return  · You feel depressed  · You have questions or concerns about your condition or care  Medicines: You may need any of the following:  · Prescription pain medicine  may be given  Ask how to take this medicine safely  · Antiplatelets , such as aspirin, help prevent blood clots  Take your antiplatelet medicine exactly as directed  These medicines make it more likely for you to bleed or bruise  If you are told to take aspirin, do not take acetaminophen or ibuprofen instead  · Cholesterol medicine  helps lower cholesterol and lipid levels in your blood  · Antibiotics  help prevent a bacterial infection  · Heart medicine  helps strengthen and regulate your heartbeat      · Blood pressure medicine  helps lower or control your blood pressure  · Take your medicine as directed  Contact your healthcare provider if you think your medicine is not helping or if you have side effects  Tell him or her if you are allergic to any medicine  Keep a list of the medicines, vitamins, and herbs you take  Include the amounts, and when and why you take them  Bring the list or the pill bottles to follow-up visits  Carry your medicine list with you in case of an emergency  Go to cardiac rehabilitation:  Cardiac rehabilitation (rehab) is a program run by specialists who will help you safely strengthen your heart and prevent more heart disease  This plan includes exercise, relaxation, stress management, and heart-healthy nutrition  Healthcare providers will also check to make sure any medicines you take are working  The plan may also include instructions for when you can drive, return to work, and do other normal daily activities  Care for your wound as directed:  Carefully wash the wound with soap and water  If you do not have a bandage, gently pat the incision dry with a clean towel  If you have a bandage, dry the area and put on a new, clean bandage  Change your bandage if it gets wet or dirty  Prevent another blocked artery:   · Eat heart healthy foods  You may need to eat foods that are low in salt, fat, or cholesterol  Healthy foods include fruits, vegetables, whole-grain breads, low-fat dairy products, beans, lean meats, and fish  Ask your healthcare provider for more information about a heart healthy diet  · Do not smoke  Nicotine and other chemicals in cigarettes and cigars can cause heart and lung damage  Ask your healthcare provider for information if you currently smoke and need help to quit  E-cigarettes or smokeless tobacco still contain nicotine  Talk to your healthcare provider before you use these products  · Maintain a healthy weight  Ask your healthcare provider how much you should weigh   Extra weight can increase the stress on your heart  Ask him to help you create a weight loss plan if you are overweight  Get a flu shot: The flu can be dangerous for a person who has heart disease  To prevent influenza (flu), all adults should get the influenza vaccine every year as soon as it becomes available  Follow up with your healthcare provider as directed:  Write down your questions so you remember to ask them during your visits  © 2017 [address] [employer] Information is for End User's use only and may not be sold, redistributed or otherwise used for commercial purposes  All illustrations and images included in CareNotes® are the copyrighted property of [employer]  or sIaiah Brand  The above information is an  only  It is not intended as medical advice for individual conditions or treatments  Talk to your doctor, nurse or pharmacist before following any medical regimen to see if it is safe and effective for you  Aortic Valve Replacement   WHAT YOU NEED TO KNOW:   Aortic valve replacement is surgery to put a new aortic valve in your heart  Your aortic valve separates the lower section of your heart from your aorta  The aorta is the large blood vessel that carries blood from your heart to your body  Your aortic valve opens and closes to let blood flow from your heart  When your aortic valve does not open or close as it should, the amount of blood that your heart can pump to your body decreases  DISCHARGE INSTRUCTIONS:   Medicines:   · Antiplatelets  help prevent blood clots  This medicine makes it more likely for you to bleed or bruise  · Blood thinners    help prevent blood clots  Examples of blood thinners include heparin and warfarin  Clots can cause strokes, heart attacks, and death  The following are general safety guidelines to follow while you are taking a blood thinner:    ¨ Watch for bleeding and bruising while you take blood thinners   Watch for bleeding from your gums or nose  Watch for blood in your urine and bowel movements  Use a soft washcloth on your skin, and a soft toothbrush to brush your teeth  This can keep your skin and gums from bleeding  If you shave, use an electric shaver  Do not play contact sports  ¨ Tell your dentist and other healthcare providers that you take anticoagulants  Wear a bracelet or necklace that says you take this medicine  ¨ Do not start or stop any medicines unless your healthcare provider tells you to  Many medicines cannot be used with blood thinners  ¨ Tell your healthcare provider right away if you forget to take the medicine, or if you take too much  ¨ Warfarin  is a blood thinner that you may need to take  The following are things you should be aware of if you take warfarin  § Foods and medicines can affect the amount of warfarin in your blood  Do not make major changes to your diet while you take warfarin  Warfarin works best when you eat about the same amount of vitamin K every day  Vitamin K is found in green leafy vegetables and certain other foods  Ask for more information about what to eat when you are taking warfarin  § You will need to see your healthcare provider for follow-up visits when you are on warfarin  You will need regular blood tests  These tests are used to decide how much medicine you need  · Heart medicine: This medicine is given to strengthen or regulate your heartbeat  It also may help your heart in other ways  Talk with your caregiver to find out what your heart medicine is and why you are taking it  · Blood pressure medicine: This is given to lower your blood pressure  A controlled blood pressure helps protect your organs, such as your heart, lungs, brain, and kidneys  Take your blood pressure medicine exactly as directed  · Antibiotics: This medicine will help kill germs that may get into your blood and cause an infection in your heart   Healthcare providers may tell you to take antibiotics before and after dental work, surgery, and some procedures  This is important after heart valve disease  · Take your medicine as directed  Contact your healthcare provider if you think your medicine is not helping or if you have side effects  Tell him or her if you are allergic to any medicine  Keep a list of the medicines, vitamins, and herbs you take  Include the amounts, and when and why you take them  Bring the list or the pill bottles to follow-up visits  Carry your medicine list with you in case of an emergency  Follow up with your healthcare provider as directed:  Write down your questions so you remember to ask them during your visits  Care for your wound:  Ask healthcare providers how to take care of your incision wound  Check your wound, clean it, and change the bandages each day  If the bandage gets dirty or falls off, put on a new one  Mouth care: Take care of your teeth and gums  Brush and floss your teeth, and see your dentist regularly  You may help prevent an infection in your heart if you do this  Tell your dentist that you have had heart valve surgery  Cardiac rehabilitation:  Your cardiologist or heart surgeon may recommend that you attend cardiac rehabilitation (rehab)  This is a program run by specialists who will help you safely strengthen your heart and prevent more heart disease  The plan includes exercise, relaxation, stress management, and heart-healthy nutrition  Healthcare providers will also check to make sure any medicines you are taking are working  The plan may also include instructions for when you can drive, return to work, and do other normal daily activities  Good nutrition for your heart:  Get enough calories, protein, vitamins, and minerals to help prevent poor nutrition and muscle wasting  You may be told to eat foods low in cholesterol or sodium (salt)  You also may be told to limit saturated and trans fats   Do eat foods that contain healthy fats, such as walnuts, salmon, and canola and soybean oils  Eat foods that help protect the heart, including plenty of fruits and vegetables, nuts, and sources of fiber  Ask what a healthy weight is for you  Set goals to reach and stay at that weight  Contact your healthcare provider if:   · You have a fever  · Your wound area is painful, red, or oozing fluid  · You feel too tired for normal activities weeks after your surgery  · Your hands, ankles, or feet are swollen  · You urinate less, or not at all  · You feel tired or weak and are short of breath  · Your arm or leg feels warm, tender, and painful  It may look swollen and red  · You have questions or concerns about your condition or care  Seek care immediately or call 911 if:   · You have blood in your bowel movements or urine  You are bleeding from your nose, mouth, or incision wound  · You have a severe headache  This may feel like the worst headache of your life  · You have weakness or numbness in your arm, leg, or face  This may happen on only 1 side of your body  · You feel lightheaded, dizzy, or sick to your stomach  You may have cold sweats and bluish skin, lips, or nail beds  · You have trouble breathing or are coughing up blood  You may have more pain when you take deep breaths or cough  · You have chest pain, or discomfort that spreads to your arms, jaw, or back, or sudden back pain  · You are confused or have problems speaking or understanding speech  You have vision changes or loss of vision  © 2017 [address] [employer]  Information is for End User's use only and may not be sold, redistributed or otherwise used for commercial purposes  All illustrations and images included in CareNotes® are the copyrighted property of [employer]  or Isaiah Brand  The above information is an  only  It is not intended as medical advice for individual conditions or treatments   Talk to your doctor, nurse or pharmacist before following any medical regimen to see if it is safe and effective for you  Heart Healthy Diet   WHAT YOU NEED TO KNOW:   What is a heart healthy diet? A heart healthy diet is an eating plan low in total fat, unhealthy fats, and sodium (salt)  A heart healthy diet helps decrease your risk for heart disease and stroke  Limit the amount of fat you eat to 25% to 35% of your total daily calories  Limit sodium to less than 2,300 mg each day  What is healthy fat, and where is it found? Healthy fats can help improve cholesterol levels  The risk for heart disease is decreased when cholesterol levels are normal  Choose healthy fats, such as the following:  · Unsaturated fat  is found in foods such as soybean, canola, olive, corn, and safflower oils  It is also found in soft tub margarine that is made with liquid vegetable oil  · Omega-3 fat  is found in certain fish, such as salmon, tuna, and trout, and in walnuts and flaxseed  What is unhealthy fat, and where is it found? Unhealthy fats can cause unhealthy cholesterol levels in your blood and increase your risk of heart disease  Limit unhealthy fats, such as the following:  · Cholesterol  is found in animal foods, such as eggs and lobster, and in dairy products made from whole milk  Limit cholesterol to less than 300 milligrams (mg) each day  You may need to limit cholesterol to 200 mg each day if you have heart disease  · Saturated fat  is found in meats, such as moss and hamburger  It is also found in chicken or turkey skin, whole milk, and butter  Limit saturated fat to less than 7% of your total daily calories  Limit saturated fat to less than 6% if you have heart disease or are at increased risk for it  · Trans fat  is found in packaged foods, such as potato chips and cookies  It is also in hard margarine, some fried foods, and shortening  Avoid trans fats as much as possible    What can I eat and drink on a heart healthy diet? Ask your dietitian or healthcare provider how many servings to have from each of the following food groups:  · Grains:      ¨ Whole-wheat breads, cereals, and pastas, and brown rice    ¨ Low-fat, low-sodium crackers and chips    · Vegetables:      ¨ Broccoli, green beans, green peas, and spinach    ¨ Collards, kale, and lima beans    ¨ Carrots, sweet potatoes, tomatoes, and peppers    ¨ Canned vegetables with no salt added    · Fruits:      ¨ Bananas, peaches, pears, and pineapple    ¨ Grapes, raisins, and dates    ¨ Oranges, tangerines, grapefruit, orange juice, and grapefruit juice    ¨ Apricots, mangoes, melons, and papaya    ¨ Raspberries and strawberries    ¨ Canned fruit with no added sugar    · Low-fat dairy products:      ¨ Nonfat (skim) milk, 1% milk, and low-fat almond, cashew, or soy milks fortified with calcium    ¨ Low-fat cheese, regular or frozen yogurt, and cottage cheese    · Meats and proteins , such as lean cuts of beef and pork (loin, leg, round), skinless chicken and turkey, legumes, soy products, egg whites, and nuts  Which foods and drinks do I need to limit or avoid?   Ask your dietitian or healthcare provider about these and other foods that are high in unhealthy fat, sodium, and sugar:  · Snack or packaged foods , such as frozen dinners, cookies, macaroni and cheese, and cereals with more than 300 mg of sodium per serving    · Canned or dry mixes  for cakes, soups, sauces, or gravies    · Vegetables with added sodium , such as instant potatoes, vegetables with added sauces, or regular canned vegetables    · Other foods high in sodium , such as ketchup, barbecue sauce, salad dressing, pickles, olives, soy sauce, and miso    · High-fat dairy foods  such as whole or 2% milk, cream cheese, or sour cream, and cheeses     · High-fat protein foods  such as high-fat cuts of beef (T-bone steaks, ribs), chicken or turkey with skin, and organ meats, such as liver    · Cured or smoked meats , such as hot dogs, moss, and sausage    · Unhealthy fats and oils , such as butter, stick margarine, shortening, and cooking oils such as coconut or palm oil    · Food and drinks high in sugar , such as soft drinks (soda), sports drinks, sweetened tea, candy, cake, cookies, pies, and doughnuts  What other diet guidelines should I follow? · Eat more foods containing omega-3 fats  Eat fish high in omega-3 fats at least 2 times a week  · Limit alcohol  Too much alcohol can damage your heart and raise your blood pressure  Women should limit alcohol to 1 drink a day  Men should limit alcohol to 2 drinks a day  A drink of alcohol is 12 ounces of beer, 5 ounces of wine, or 1½ ounces of liquor  · Choose low-sodium foods  High-sodium foods can lead to high blood pressure  Add little or no salt to food you prepare  Use herbs and spices in place of salt  · Eat more fiber  to help lower cholesterol levels  Eat at least 5 servings of fruits and vegetables each day  Eat 3 ounces of whole-grain foods each day  Legumes (beans) are also a good source of fiber  What lifestyle guidelines should I follow? · Do not smoke  Nicotine and other chemicals in cigarettes and cigars can cause lung and heart damage  Ask your healthcare provider for information if you currently smoke and need help to quit  E-cigarettes or smokeless tobacco still contain nicotine  Talk to your healthcare provider before you use these products  · Exercise regularly  to help you maintain a healthy weight and improve your blood pressure and cholesterol levels  Ask your healthcare provider about the best exercise plan for you  Do not start an exercise program without asking your healthcare provider  CARE AGREEMENT:   You have the right to help plan your care  Discuss treatment options with your caregivers to decide what care you want to receive  You always have the right to refuse treatment   The above information is an  only  It is not intended as medical advice for individual conditions or treatments  Talk to your doctor, nurse or pharmacist before following any medical regimen to see if it is safe and effective for you  © 2017 [facility] Information is for End User's use only and may not be sold, redistributed or otherwise used for commercial purposes  All illustrations and images included in CareNotes® are the copyrighted property of [employer]  or Isaiah Brand  Narcotic Pain Management   WHAT YOU NEED TO KNOW:   What do I need to know about narcotics? A narcotic is a type of medicine used to treat pain  Examples of narcotics are codeine, oxycodone, and fentanyl  Why is it important to manage my pain? Pain can cause changes in your physical and emotional health, such as depression and sleep problems  Pain control and management may help you rest, heal, and return to your daily activities  What are the side effects of narcotic medicines? The most common side effect is constipation  Drink more liquids and eat high-fiber foods to help prevent constipation  Ask your healthcare provider what liquids are right for you and how much you should drink  Also ask for a list of foods that contain fiber  Other side effects include nausea, sleepiness, and itchiness  You may need to take your narcotic medicine with food to decrease nausea  Ask your healthcare provider other ways to manage side effects  Why it is important that I take narcotic medicines as directed? · Health problems such as  trouble breathing, liver or kidney damage, or stomach bleeding may occur  Any of these problems can become life-threatening  · Acetaminophen or ibuprofen  may be included in some narcotic medicines  Too much of these medicines can cause liver or kidney damage, or stomach bleeding  These problems can become life-threatening       · Dependence  means your body needs the medicine to keep it from going through withdrawal      · Tolerance  means the medicine does not control pain as well as it used to  You need higher doses of the medicine to get pain relief  · Addiction  means you are not able to control the use of the medicine  You use it when you do not have pain and you have cravings for the medicine  What do I need to know about narcotic safety? · Take your medicine as directed  Ask if you need more information on how to take your medicine correctly  Follow up with your healthcare provider regularly  You may need to have your dose adjusted  Do not use narcotic medicine if you are pregnant or breastfeeding  Narcotic medicines can be transferred to your baby through your blood and breast milk  · Give your healthcare provider a list of all your medicines  Include any over-the-counter medicines, vitamins, and herbs  It can be dangerous to take narcotics with certain other medicines, such as antihistamines  · Keep your medicine in a safe place  Store your narcotic medicine in a locked cabinet to keep it away from children and others  · Do not drink alcohol while you use narcotics  Alcohol use with a narcotic medicine can make you sleepy and slow your breathing rate  You may stop breathing completely  · Do not drive or operate heavy machinery after you take narcotic medicine  Narcotic medicine can make you drowsy and make it hard to concentrate  You may injure yourself or others if you drive or operate heavy machinery while taking your medicine  Call 911 or have someone call 911 for any of the following:   · You are breathing slower than normal, or you have trouble breathing  · You cannot be woken  · You have a seizure  When should I seek immediate care? · Your heart is beating slower than usual     · Your heart feels like it is jumping or fluttering  · You have trouble staying awake  · You have severe muscle pain or weakness       · You see or hear things that are not real   When should I contact my healthcare provider? · You are too dizzy to stand up  · Your pain gets worse or you have new pain  · Your pain does not get better after you use your narcotic medicine  · You cannot do your usual activities because of side effects from the narcotic  · You are constipated or have abdominal pain  · You have questions or concerns about your condition or care  CARE AGREEMENT:   You have the right to help plan your care  Learn about your health condition and how it may be treated  Discuss treatment options with your caregivers to decide what care you want to receive  You always have the right to refuse treatment  The above information is an  only  It is not intended as medical advice for individual conditions or treatments  Talk to your doctor, nurse or pharmacist before following any medical regimen to see if it is safe and effective for you  © 2017 [address]  Information is for End User's use only and may not be sold, redistributed or otherwise used for commercial purposes  All illustrations and images included in CareNotes® are the copyrighted property of [employer] M [employer]  or [employer]  Novel Coronavirus   WHAT YOU NEED TO KNOW:   What is the novel coronavirus (nCoV)? The nCoV is a new strain (type) of coronavirus  Coronaviruses often cause mild respiratory (lung) infections, such as the common cold  They can also cause more severe infections  Examples include acute respiratory syndrome (SARS), Middle East respiratory syndrome (MERS), pneumonia, and bronchitis  The nCoV can cause more severe symptoms than earlier coronaviruses  The nCoV was first found in individuals in part of [place] at the end of 2019  The virus is spreading as people who are infected travel to other parts of the world  What are the signs and symptoms of nCoV?   Signs and symptoms can take 2 to 14 days to develop and range from mild to severe:  · Fever    · Cough    · Shortness of breath or trouble breathing    · Pneumonia (in severe cases)    How does nCoV spread? It is not known for sure how the virus spreads  The virus may spread in droplets when an infected person coughs or sneezes  Others become infected by breathing in the virus or getting the virus in their eyes  The virus can also possibly spread if a person touches certain animals, such as in a live market  How is nCoV diagnosed? If you develop symptoms of nCoV, you may need to be checked  Call your doctor if you traveled within the past 14 days to an area where nCoV is active  Call your doctor if you have close contact with someone else who did  Your doctor will tell you what to do  He or she will need to take precautions in the office to protect staff members and other patients  Your doctor will take samples from your airway  The samples have to be sent to the Centers for Disease Control and Prevention (CDC) to be tested  What should I do if I have nCoV? No treatment is available for nCoV  Medicine may be given to help relieve your cough or fever, or to help you breathe more easily  Severe nCoV may need to be treated in the hospital  In the hospital, you may need breathing treatment or support, such as extra oxygen  The following can help you prevent spreading nCoV to others  Have anyone you are caring for who has nCoV also use the guidelines  · Limit close contact with others  Stay in a different room, or sleep in a separate bed  Remind others to stay at least 6 feet (2 meters) away from you while you are sick  Only go out of your house if you are going to a medical appointment  While you are recovering, always call the office of any healthcare provider you seeing  The provider will need to prepare for your arrival to keep others safe  · Wear a medical mask when you are around others  A medical mask will help prevent you from spreading the virus   You can ask others around you to wear a medical mask if you are not able to wear one  · Cover your mouth and nose to sneeze or cough  Sneeze and cough into a tissue that covers your mouth and nose  Use the bend of our arm if you do not have a tissue  Throw the tissue away in a trash can right away  Then wash your hands well with soap and water  Use hand  that contains alcohol if you cannot wash your hands  · Wash your hands often  You and everyone in your home must wash your hands throughout the day  Use soap and water  Use germ-killing gel if soap and water are not available  Do not touch your eyes or mouth if you have not washed your hands  · Do not share items with other people  Do not share dishes, towels, or other items with anyone  Always wash items after you use them  · Clean frequently touched surfaces often  Use household  or bleach diluted with water to clean counters, doorknobs, toilet seats, and other surfaces  · Do not handle live animals  You may be able to spread nCoV to animals, including pets  Until more is known, it is best not to touch, play with, or handle live animals while you are sick  What can I do to relieve my symptoms? The following may help if you have mild symptoms:  · Drink more liquids as directed  Liquids will help thin and loosen mucus so you can cough it up  Liquids will also help prevent dehydration  Liquids that help prevent dehydration include water, fruit juice, and broth  Do not drink liquids that contain caffeine  Caffeine can increase your risk for dehydration  Ask your healthcare provider how much liquid to drink each day  · Soothe a sore throat  Gargle with warm salt water  This helps your sore throat feel better  Make salt water by dissolving ¼ teaspoon salt in 1 cup warm water  You may also suck on hard candy or throat lozenges  You may use a sore throat spray  · Use a humidifier or vaporizer    Use a cool mist humidifier or a vaporizer to increase air moisture in your home  This may make it easier for you to breathe and help decrease your cough  · Use saline nasal drops as directed  These help relieve congestion  · Apply petroleum-based jelly around the outside of your nostrils  This can decrease irritation from blowing your nose  · Do not smoke  Nicotine and other chemicals in cigarettes and cigars can make your symptoms worse  They can also cause infections such as bronchitis or pneumonia  Ask your healthcare provider for information if you currently smoke and need help to quit  E-cigarettes or smokeless tobacco still contain nicotine  Talk to your healthcare provider before you use these products  Call your local emergency number [phone] in the [address]) for any of the following:  Tell the  you may have nCoV  This will help anyone in the ambulance stay safe, and to call ahead to the hospital   · You have sudden shortness of breath  · You have a fast heartbeat or chest pain  When should I seek immediate care? · You feel so dizzy that you have trouble standing up  · Your lips and fingernails turn blue  When should I call my doctor? · You have a fever over 102ºF (39ºC)  · Your sore throat gets worse or you see white or yellow spots in your throat  · Your symptoms get worse after 3 to 5 days or your cold is not better in 14 days  · You have a rash anywhere on your skin  · You have large, tender lumps in your neck  · You have thick, green, or yellow drainage from your nose  · You cough up thick yellow, green, or bloody mucus  · You are vomiting for more than 24 hours and cannot keep fluids down  · You have a bad earache  · You have questions or concerns about your condition or care  CARE AGREEMENT:   You have the right to help plan your care  Learn about your health condition and how it may be treated   Discuss treatment options with your healthcare providers to decide what care you want to receive  You always have the right to refuse treatment  The above information is an  only  It is not intended as medical advice for individual conditions or treatments  Talk to your doctor, nurse or pharmacist before following any medical regimen to see if it is safe and effective for you  © Copyright [address] Information is for End User's use only and may not be sold, redistributed or otherwise used for commercial purposes   All illustrations and images included in CareNotes® are the copyrighted property of A D A M , Inc  or [address] [place] [address]

## 2020-08-21 PROBLEM — I97.89 POSTOPERATIVE ATRIAL FIBRILLATION (HCC): Status: ACTIVE | Noted: 2020-08-21

## 2020-08-21 PROBLEM — I48.91 POSTOPERATIVE ATRIAL FIBRILLATION (HCC): Status: ACTIVE | Noted: 2020-08-21

## 2020-08-21 LAB
ANION GAP SERPL CALCULATED.3IONS-SCNC: 3 MMOL/L (ref 4–13)
ATRIAL RATE: 125 BPM
BUN SERPL-MCNC: 13 MG/DL (ref 5–25)
CALCIUM SERPL-MCNC: 7.9 MG/DL (ref 8.3–10.1)
CHLORIDE SERPL-SCNC: 104 MMOL/L (ref 100–108)
CO2 SERPL-SCNC: 29 MMOL/L (ref 21–32)
CREAT SERPL-MCNC: 0.78 MG/DL (ref 0.6–1.3)
ERYTHROCYTE [DISTWIDTH] IN BLOOD BY AUTOMATED COUNT: 13.3 % (ref 11.6–15.1)
GFR SERPL CREATININE-BSD FRML MDRD: 95 ML/MIN/1.73SQ M
GLUCOSE SERPL-MCNC: 108 MG/DL (ref 65–140)
GLUCOSE SERPL-MCNC: 111 MG/DL (ref 65–140)
GLUCOSE SERPL-MCNC: 123 MG/DL (ref 65–140)
GLUCOSE SERPL-MCNC: 127 MG/DL (ref 65–140)
GLUCOSE SERPL-MCNC: 180 MG/DL (ref 65–140)
HCT VFR BLD AUTO: 39.1 % (ref 36.5–49.3)
HGB BLD-MCNC: 12.8 G/DL (ref 12–17)
INR PPP: 1.09 (ref 0.84–1.19)
MCH RBC QN AUTO: 28.4 PG (ref 26.8–34.3)
MCHC RBC AUTO-ENTMCNC: 32.7 G/DL (ref 31.4–37.4)
MCV RBC AUTO: 87 FL (ref 82–98)
PLATELET # BLD AUTO: 152 THOUSANDS/UL (ref 149–390)
PMV BLD AUTO: 12.7 FL (ref 8.9–12.7)
POTASSIUM SERPL-SCNC: 4.2 MMOL/L (ref 3.5–5.3)
PROTHROMBIN TIME: 14.1 SECONDS (ref 11.6–14.5)
QRS AXIS: 91 DEGREES
QRSD INTERVAL: 104 MS
QT INTERVAL: 376 MS
QTC INTERVAL: 499 MS
RBC # BLD AUTO: 4.51 MILLION/UL (ref 3.88–5.62)
SODIUM SERPL-SCNC: 136 MMOL/L (ref 136–145)
T WAVE AXIS: 148 DEGREES
VENTRICULAR RATE: 106 BPM
WBC # BLD AUTO: 14.39 THOUSAND/UL (ref 4.31–10.16)

## 2020-08-21 PROCEDURE — 99232 SBSQ HOSP IP/OBS MODERATE 35: CPT | Performed by: NURSE PRACTITIONER

## 2020-08-21 PROCEDURE — 85027 COMPLETE CBC AUTOMATED: CPT | Performed by: PHYSICIAN ASSISTANT

## 2020-08-21 PROCEDURE — 99024 POSTOP FOLLOW-UP VISIT: CPT | Performed by: PHYSICIAN ASSISTANT

## 2020-08-21 PROCEDURE — 82948 REAGENT STRIP/BLOOD GLUCOSE: CPT

## 2020-08-21 PROCEDURE — 97116 GAIT TRAINING THERAPY: CPT

## 2020-08-21 PROCEDURE — 93010 ELECTROCARDIOGRAM REPORT: CPT | Performed by: INTERNAL MEDICINE

## 2020-08-21 PROCEDURE — 99024 POSTOP FOLLOW-UP VISIT: CPT | Performed by: THORACIC SURGERY (CARDIOTHORACIC VASCULAR SURGERY)

## 2020-08-21 PROCEDURE — 80048 BASIC METABOLIC PNL TOTAL CA: CPT | Performed by: PHYSICIAN ASSISTANT

## 2020-08-21 PROCEDURE — 85610 PROTHROMBIN TIME: CPT | Performed by: PHYSICIAN ASSISTANT

## 2020-08-21 RX ORDER — WARFARIN SODIUM 5 MG/1
5 TABLET ORAL
Status: COMPLETED | OUTPATIENT
Start: 2020-08-21 | End: 2020-08-21

## 2020-08-21 RX ORDER — POTASSIUM CHLORIDE 20 MEQ/1
20 TABLET, EXTENDED RELEASE ORAL 2 TIMES DAILY
Status: DISCONTINUED | OUTPATIENT
Start: 2020-08-21 | End: 2020-08-22 | Stop reason: HOSPADM

## 2020-08-21 RX ORDER — FUROSEMIDE 10 MG/ML
40 INJECTION INTRAMUSCULAR; INTRAVENOUS
Status: DISCONTINUED | OUTPATIENT
Start: 2020-08-21 | End: 2020-08-22 | Stop reason: HOSPADM

## 2020-08-21 RX ORDER — METOPROLOL TARTRATE 5 MG/5ML
2.5 INJECTION INTRAVENOUS ONCE
Status: COMPLETED | OUTPATIENT
Start: 2020-08-21 | End: 2020-08-21

## 2020-08-21 RX ADMIN — ACETAMINOPHEN 975 MG: 325 TABLET, FILM COATED ORAL at 00:49

## 2020-08-21 RX ADMIN — CHLORHEXIDINE GLUCONATE 0.12% ORAL RINSE 15 ML: 1.2 LIQUID ORAL at 18:19

## 2020-08-21 RX ADMIN — POTASSIUM CHLORIDE 20 MEQ: 1500 TABLET, EXTENDED RELEASE ORAL at 18:18

## 2020-08-21 RX ADMIN — ACETAMINOPHEN 975 MG: 325 TABLET, FILM COATED ORAL at 09:07

## 2020-08-21 RX ADMIN — ACETAMINOPHEN 975 MG: 325 TABLET, FILM COATED ORAL at 18:18

## 2020-08-21 RX ADMIN — METOPROLOL TARTRATE 25 MG: 25 TABLET, FILM COATED ORAL at 18:19

## 2020-08-21 RX ADMIN — DOCUSATE SODIUM 100 MG: 100 CAPSULE, LIQUID FILLED ORAL at 09:06

## 2020-08-21 RX ADMIN — FONDAPARINUX SODIUM 2.5 MG: 2.5 INJECTION, SOLUTION SUBCUTANEOUS at 09:08

## 2020-08-21 RX ADMIN — FUROSEMIDE 40 MG: 10 INJECTION, SOLUTION INTRAMUSCULAR; INTRAVENOUS at 09:08

## 2020-08-21 RX ADMIN — MUPIROCIN 1 APPLICATION: 20 OINTMENT TOPICAL at 09:07

## 2020-08-21 RX ADMIN — WARFARIN SODIUM 5 MG: 5 TABLET ORAL at 18:19

## 2020-08-21 RX ADMIN — PANTOPRAZOLE SODIUM 40 MG: 40 TABLET, DELAYED RELEASE ORAL at 09:06

## 2020-08-21 RX ADMIN — POLYETHYLENE GLYCOL 3350 17 G: 17 POWDER, FOR SOLUTION ORAL at 09:08

## 2020-08-21 RX ADMIN — FUROSEMIDE 40 MG: 10 INJECTION, SOLUTION INTRAMUSCULAR; INTRAVENOUS at 18:19

## 2020-08-21 RX ADMIN — ASPIRIN 325 MG ORAL TABLET 325 MG: 325 PILL ORAL at 09:07

## 2020-08-21 RX ADMIN — METOPROLOL TARTRATE 25 MG: 25 TABLET, FILM COATED ORAL at 09:06

## 2020-08-21 RX ADMIN — CHLORHEXIDINE GLUCONATE 0.12% ORAL RINSE 15 ML: 1.2 LIQUID ORAL at 09:08

## 2020-08-21 RX ADMIN — POTASSIUM CHLORIDE 20 MEQ: 1500 TABLET, EXTENDED RELEASE ORAL at 09:06

## 2020-08-21 RX ADMIN — DOCUSATE SODIUM 100 MG: 100 CAPSULE, LIQUID FILLED ORAL at 18:19

## 2020-08-21 RX ADMIN — ATORVASTATIN CALCIUM 80 MG: 80 TABLET, FILM COATED ORAL at 18:19

## 2020-08-21 RX ADMIN — METOROPROLOL TARTRATE 2.5 MG: 5 INJECTION, SOLUTION INTRAVENOUS at 09:08

## 2020-08-21 RX ADMIN — MUPIROCIN 1 APPLICATION: 20 OINTMENT TOPICAL at 21:43

## 2020-08-21 RX ADMIN — INSULIN LISPRO 1 UNITS: 100 INJECTION, SOLUTION INTRAVENOUS; SUBCUTANEOUS at 18:48

## 2020-08-21 RX ADMIN — METOPROLOL TARTRATE 25 MG: 25 TABLET, FILM COATED ORAL at 23:51

## 2020-08-21 NOTE — PROGRESS NOTES
Called by RN related to pt with new onset rate controlled afib (113 bpm)  Pt asymptomatic and BP stable 114/67  Pt due for PO metoprolol, asked RN to administer scheduled dose  BMP and Mag ordered to check lytes  Pt with Iodine allergy with rxn of anaphylaxis, thus not able to utilize amiodarone  Cardiology following      YENIFER Bowman

## 2020-08-21 NOTE — PHYSICAL THERAPY NOTE
Physical Therapy Progress Note     08/21/20 1405   Pain Assessment   Pain Assessment Tool Pain Assessment not indicated - pt denies pain   Pain Score No Pain   Restrictions/Precautions   Other Precautions Cardiac/sternal;Telemetry   Subjective   Subjective The pt  states that he is feeling better  Bed Mobility   Supine to Sit 5  Supervision   Additional items Increased time required   Transfers   Sit to Stand 5  Supervision   Stand to Sit 5  Supervision   Ambulation/Elevation   Gait pattern Excessively slow   Gait Assistance 5  Supervision   Assistive Device Rolling walker   Distance 360 feet  Balance   Static Sitting Normal   Dynamic Sitting Good   Static Standing Fair +   Ambulatory Fair   Activity Tolerance   Activity Tolerance Patient tolerated treatment well   Assessment   Prognosis Excellent   Problem List Decreased mobility; Impaired balance   Assessment The pt  has improving balance and activity tolerance today  He did require increased time to get out of bed, but he completed this without any assistance  He is likely able to progress to no device, but he requested to utilize the walker  Pt  reports that he does not have to do stairs in the house  Barriers to Discharge None   Goals   Patient Goals To go home  STG Expiration Date 08/30/20   PT Treatment Day 2   Plan   Treatment/Interventions Functional transfer training;LE strengthening/ROM; Therapeutic exercise; Endurance training;Patient/family training;Bed mobility;Gait training   Progress Improving as expected   PT Frequency   (4-6x a week )   Recommendation   PT Discharge Recommendation Return to previous environment with social support   Equipment Recommended Joanna Frey   PT - OK to Discharge Yes     Emi Barajas, BRANDON

## 2020-08-21 NOTE — PLAN OF CARE
Problem: PHYSICAL THERAPY ADULT  Goal: Performs mobility at highest level of function for planned discharge setting  See evaluation for individualized goals  Description: Treatment/Interventions: Functional transfer training, LE strengthening/ROM, Therapeutic exercise, Endurance training, Equipment eval/education, Bed mobility, Gait training, Spoke to nursing, OT  Equipment Recommended: Walker(at this time)       See flowsheet documentation for full assessment, interventions and recommendations  Outcome: Adequate for Discharge  Note: Prognosis: Excellent  Problem List: Decreased mobility, Impaired balance  Assessment: The pt  has improving balance and activity tolerance today  He did require increased time to get out of bed, but he completed this without any assistance  He is likely able to progress to no device, but he requested to utilize the walker  Pt  reports that he does not have to do stairs in the house  Barriers to Discharge: None     PT Discharge Recommendation: Return to previous environment with social support     PT - OK to Discharge: Yes    See flowsheet documentation for full assessment

## 2020-08-21 NOTE — PROCEDURES
08/21/20    Procedure: Chest tube removal    Chest tubes removed in routine fashion without incident  Insertion site dressed with Acticoat  Allison Agent tolerated the procedure well  Nurse notified  SIGNATURECedric Rojas PA-C  DATE: August 21, 2020  TIME: 10:50 AM  08/21/20    Procedure: Epicardial Pacing Wire removal    Allison Agent was returned to bed and informed of mandatory one hour post-procedure bed rest   The assigned nurse was notified  Epicardial pacing wires removed in routine fashion, without incident  The patient tolerated the procedure well  Vital signs ordered  q 15 minutes for one hour, as per protocol      SIGNATURE: Truddie Fabry, PA-C  DATE: August 21, 2020  TIME: 10:50 AM

## 2020-08-21 NOTE — PROGRESS NOTES
General Cardiology   Progress Note -  Team One   Cori Putnam 72 y o  male MRN: [id number]    Unit/Bed#: [facility] 419-01 Encounter: [id number]    Assessment/ Plan:      1  CAD s/p CABg x1   2  Severe Aortic Stenosis: s p AVR  3  HTN  4  HLD  5  Preserved LV systolic function EF 33%  6  Post op atrial fibrillation     Plan:  Pt is s/p CABG x2 with SVG to OM2 and Aortic valve replacement with a 21mm Morrison Inspiris Resilia pericardial tissue valve  He went into atrial fibrillation last evening at about 8pm; reamins in atrial fibrillation with rates about 100-110; not feeling any palpitations  He has severe contrast allergy and unable to use amiodarone; he is getting dose of IV metoprolol this AM; will increase standing BB to metoprolol tartrate 25mg q6h with hold parameters to avoid hypotension; continue to monitor; will defer [facility] decision to cardiac surgery; this plan was discussed with surgical team    Continue ASA, statin  IV lasix for post op volume overload:  Cr and K stable     Subjective: pt seen for follow up; reports feeling well today; he went into afib last night but not feeling any palpitations  Pain well controlled, Ambulated in halls this AM 360ft    Review of Systems   Constitution: Negative for decreased appetite and fever  Cardiovascular: Negative for dyspnea on exertion, irregular heartbeat, leg swelling, orthopnea, palpitations and syncope  Incisional pain with deep inspiration   Respiratory: Negative for cough, shortness of breath and wheezing  Gastrointestinal: Negative for abdominal pain, nausea and vomiting  Tolerating po diet   Genitourinary: Negative for dysuria  Neurological: Negative for dizziness and light-headedness  Psychiatric/Behavioral: Negative for altered mental status  All other systems reviewed and are negative  Objective:   Vitals: Blood pressure 111/69, pulse 77, temperature 98 °F (36 7 °C), temperature source Oral, resp   rate 18, height 5' 3" (1 6 m), weight 79 4 kg (175 lb 0 7 oz), SpO2 95 %  ,     Body mass index is 31 01 kg/m²  ,     Systolic (12ZPP), ZBQ:736 , Min:105 , RHV:702     Diastolic (86EUR), III:63, Min:62, Max:74      Intake/Output Summary (Last 24 hours) at 8/21/2020 0900  Last data filed at 8/21/2020 0809  Gross per 24 hour   Intake 280 ml   Output 1430 ml   Net -1150 ml     Weight (last 2 days)     Date/Time   Weight    08/21/20 0331   79 4 (175 05)    08/20/20 0600   83 7 (184 53)            Telemetry Review:   Currently in atrial fibrillation    Physical Exam  Vitals signs reviewed  Constitutional:       General: He is not in acute distress  Appearance: Normal appearance  He is not ill-appearing  HENT:      Head: Normocephalic and atraumatic  Neck:      Comments: RIJ TLC intact  Cardiovascular:      Rate and Rhythm: Tachycardia present  Rhythm irregular  Heart sounds: S1 normal and S2 normal  No murmur  No friction rub  Comments: Trace LE edema  MS incision dsg C/D/I  CT and EPW intact  Pulmonary:      Effort: Pulmonary effort is normal       Comments: BS decreased; on RA  Abdominal:      Palpations: Abdomen is soft  Skin:     General: Skin is warm and dry  Neurological:      General: No focal deficit present  Mental Status: He is alert and oriented to person, place, and time     Psychiatric:         Mood and Affect: Mood normal         LABORATORY RESULTS  Results from last 7 days   Lab Units 08/16/20  1830 08/16/20  1543 08/16/20  1035   TROPONIN I ng/mL 13 16* 9 78* 12 79*     CBC with diff:   Results from last 7 days   Lab Units 08/21/20  0443 08/20/20  0424 08/20/20  0031 08/19/20  1708 08/19/20  1707 08/19/20  1559 08/19/20  1539  08/19/20  1519  08/19/20  0620 08/18/20  0530 08/17/20  0453 08/15/20  1453   WBC Thousand/uL 14 39* 13 53*  --   --   --   --   --   --   --   --  11 30* 13 81* 19 16* 10 90*   HEMOGLOBIN g/dL 12 8 12 9 13 1 13 0  --   --   --   --   --   --  15 2 13 8 14 4 15 4   I STAT HEMOGLOBIN g/dl  --   --   --   --  12 2 9 2* 8 8*   < >  --    < >  --   --   --   --    HEMATOCRIT % 39 1 39 2 40 0 39 1  --   --   --   --   --   --  48 0 42 0 43 9 48 0   HEMATOCRIT, ISTAT %  --   --   --   --  36* 27* 26*   < >  --    < >  --   --   --   --    MCV fL 87 86  --   --   --   --   --   --   --   --  88 86 85 85   PLATELETS Thousands/uL 152 123*  --  112*  --   --   --   --  134*  --  200 188 230 234   MCH pg 28 4 28 4  --   --   --   --   --   --   --   --  28 0 28 3 28 0 27 4   MCHC g/dL 32 7 32 9  --   --   --   --   --   --   --   --  31 7 32 9 32 8 32 1   RDW % 13 3 13 4  --   --   --   --   --   --   --   --  13 5 13 6 13 4 13 2   MPV fL 12 7 12 6  --  12 3  --   --   --   --  12 5  --  12 5 12 5 12 0 12 0   NRBC AUTO /100 WBCs  --   --   --   --   --   --   --   --   --   --   --  0  --  0    < > = values in this interval not displayed       CMP:  Results from last 7 days   Lab Units 08/21/20  0443 08/20/20  2047 08/20/20  0424 08/20/20  0031 08/19/20  2114 08/19/20  1708 08/19/20  1707 08/19/20  1559 08/19/20  1539 08/19/20  1524 08/19/20  1453 08/19/20  1423 08/19/20  1359  08/19/20  0706 08/18/20  0554 08/17/20  0453 08/15/20  1453   POTASSIUM mmol/L 4 2 3 7 4 2 4 3 4 1 4 0  --   --   --   --   --   --   --   --  3 7 3 8 4 2 4 6   CHLORIDE mmol/L 104 105 106  --   --  110*  --   --   --   --   --   --   --   --  106 109* 106 103   CO2 mmol/L 29 28 25  --   --  22  --   --   --   --   --   --   --   --  28 27 26 29   CO2, I-STAT mmol/L  --   --   --   --   --   --  23 26 25 27 25 25 26   < >  --   --   --   --    BUN mg/dL 13 15 11  --   --  13  --   --   --   --   --   --   --   --  15 20 22 10   CREATININE mg/dL 0 78 0 87 0 67  --   --  0 81  --   --   --   --   --   --   --   --  1 15 0 89 1 12 1 07   GLUCOSE, ISTAT mg/dl  --   --   --   --   --   --  114 122 130 126 110 100 107   < >  --   --   --   --    CALCIUM mg/dL 7 9* 7 4* 7 1*  --   --  7 3*  --   --   --   --   --   --   --   --  8 5 8 7 9  0 10 1   AST U/L  --   --   --   --   --   --   --   --   --   --   --   --   --   --   --   --   --  20   ALT U/L  --   --   --   --   --   --   --   --   --   --   --   --   --   --   --   --   --  29   ALK PHOS U/L  --   --   --   --   --   --   --   --   --   --   --   --   --   --   --   --   --  79   EGFR ml/min/1 73sq m 95 91 101  --   --  93  --   --   --   --   --   --   --   --  66 90 69 72    < > = values in this interval not displayed  BMP:  Results from last 7 days   Lab Units 08/21/20  0443 08/20/20  2047 08/20/20  0424 08/20/20  0031 08/19/20  2114 08/19/20  1708 08/19/20  1707 08/19/20  1559 08/19/20  1539  08/19/20  0706 08/18/20  0554 08/17/20  0453   POTASSIUM mmol/L 4 2 3 7 4 2 4 3 4 1 4 0  --   --   --   --  3 7 3 8 4 2   CHLORIDE mmol/L 104 105 106  --   --  110*  --   --   --   --  106 109* 106   CO2 mmol/L 29 28 25  --   --  22  --   --   --   --  28 27 26   CO2, I-STAT mmol/L  --   --   --   --   --   --  23 26 25   < >  --   --   --    BUN mg/dL 13 15 11  --   --  13  --   --   --   --  15 20 22   CREATININE mg/dL 0 78 0 87 0 67  --   --  0 81  --   --   --   --  1 15 0 89 1 12   GLUCOSE, ISTAT mg/dl  --   --   --   --   --   --  114 122 130   < >  --   --   --    CALCIUM mg/dL 7 9* 7 4* 7 1*  --   --  7 3*  --   --   --   --  8 5 8 7 9 0    < > = values in this interval not displayed       Lab Results   Component Value Date    NTBNP 804 (H) 08/15/2020     Results from last 7 days   Lab Units 08/20/20  2047 08/20/20  0424   MAGNESIUM mg/dL 2 8* 2 8*     Results from last 7 days   Lab Units 08/19/20  0722   HEMOGLOBIN A1C % 5 5     Results from last 7 days   Lab Units 08/17/20 2123 08/16/20  0011   INR  0 99 1 11     Lipid Profile:   No results found for: CHOL  Lab Results   Component Value Date    HDL 39 (L) 08/17/2020    HDL 36 (L) 08/17/2020     Lab Results   Component Value Date    LDLCALC 109 (H) 08/17/2020    LDLCALC 132 (H) 08/17/2020     Lab Results   Component Value Date TRIG 285 (H) 2020    TRIG 150 2020     Cardiac testing:   Results for orders placed during the hospital encounter of 08/15/20   Echo complete with contrast if indicated    Narrative [address], [place] [address]  [phone]    Transthoracic Echocardiogram  2D, M-mode, and Color Doppler    Study date:  16-Aug-2020    Patient: Christy Bello  MR number: [id number]  Account number: [de-identified]  : 1955  Age: 72 years  Gender: Male  Status: Inpatient  Location: Bedside  Height: 63 in  Weight: 190 lb  BP: 133/ 63 mmHg    Indications: Murmur  Diagnoses: R01 1 - Cardiac murmur, unspecified    Sonographer:  Saldivar RCS  Referring Physician:  Maris Cisneros  Group:  [facility]  Interpreting Physician:  Renee Betancourt MD    SUMMARY    LEFT VENTRICLE:  Systolic function was normal  Ejection fraction was estimated to be 60 %  There were no regional wall motion abnormalities  There was moderate-severe concentric hypertrophy  RIGHT VENTRICLE:  The size was normal   Systolic function was normal     MITRAL VALVE:  There was mild annular calcification  There was mild stenosis  There was mild to moderate regurgitation  AORTIC VALVE:  The valve was not visualized well enough to rule out a bicuspid morphology  Leaflets exhibited increased thickness and calcification with reduced cuspal separation  Transaortic velocity and gradient were increased due to stenosis as well as concomitant increased transaortic flow  There was severe stenosis  Peak and mean AV gradients were 102 and 57 mm Hg respectively  KAUR by the continuity equation method was 0 6 sq cm  There was moderate regurgitation  TRICUSPID VALVE:  There was trace regurgitation  Estimated peak PA pressure was 44 mmHg  PULMONIC VALVE:  There was trace regurgitation  HISTORY: PRIOR HISTORY: NSTEMI  Hypertension      PROCEDURE: The procedure was performed at the bedside  This was a routine study  The transthoracic approach was used  The study included complete 2D imaging, M-mode, and color Doppler  The heart rate was 84 bpm, at the start of the study  Images were obtained from the parasternal, apical, subcostal, and suprasternal notch acoustic windows  Image quality was adequate  LEFT VENTRICLE: Size was normal  Systolic function was normal  Ejection fraction was estimated to be 60 %  There were no regional wall motion abnormalities  There was moderate-severe concentric hypertrophy  No evidence of apical thrombus  DOPPLER: Left ventricular diastolic function parameters were normal     RIGHT VENTRICLE: The size was normal  Systolic function was normal  Wall thickness was normal     LEFT ATRIUM: Size was normal     RIGHT ATRIUM: Size was normal     MITRAL VALVE: There was mild annular calcification  There was normal leaflet separation  DOPPLER: There was mild stenosis  There was mild to moderate regurgitation  AORTIC VALVE: The valve was not visualized well enough to rule out a bicuspid morphology  Leaflets exhibited increased thickness and calcification with reduced cuspal separation  DOPPLER: Transaortic velocity and gradient were increased  due to stenosis as well as concomitant increased transaortic flow  There was severe stenosis  Peak and mean AV gradients were 102 and 57 mm Hg respectively  KAUR by the continuity equation method was 0 6 sq cm  There was moderate  regurgitation  TRICUSPID VALVE: The valve structure was normal  There was normal leaflet separation  DOPPLER: The transtricuspid velocity was within the normal range  There was no evidence for stenosis  There was trace regurgitation  Estimated peak PA  pressure was 44 mmHg  PULMONIC VALVE: Leaflets exhibited normal thickness, no calcification, and normal cuspal separation  DOPPLER: The transpulmonic velocity was within the normal range   There was trace regurgitation  PERICARDIUM: There was no pericardial effusion  The pericardium was normal in appearance  AORTA: The root exhibited normal size  SYSTEMIC VEINS: IVC: The inferior vena cava was normal in size  The respirophasic change in diameter was less than 50%  SYSTEM MEASUREMENT TABLES    2D  %FS: 25 1 %  Ao Diam: 3 7 cm  EDV(Teich): 88 3 ml  EF(Teich): 49 8 %  ESV(Teich): 44 3 ml  IVSd: 1 8 cm  LA Area: 16 9 cm2  LA Diam: 4 3 cm  LVEDV MOD A4C: 123 9 ml  LVEF MOD A4C: 50 7 %  LVESV MOD A4C: 61 ml  LVIDd: 4 4 cm  LVIDs: 3 3 cm  LVLd A4C: 8 9 cm  LVLs A4C: 8 1 cm  LVOT Diam: 1 9 cm  LVPWd: 1 4 cm  RA Area: 14 4 cm2  RVIDd: 3 3 cm  SV MOD A4C: 62 9 ml  SV(Teich): 44 ml    CW  AR Dec [place]: 2 3 m/s2  AR Dec Time: 1441 5 ms  AR PHT: 418 ms  AR Vmax: 3 2 m/s  AR maxP 5 mmHg  AV Env  Ti: 363 5 ms  AV VTI: 127 5 cm  AV Vmax: 4 8 m/s  AV Vmean: 3 5 m/s  AV maxP 7 mmHg  AV meanP 2 mmHg  TR Vmax: 3 m/s  TR maxP 2 mmHg    MM  TAPSE: 2 1 cm    PW  KAUR (VTI): 0 6 cm2  KAUR Vmax: 0 5 cm2  E': 0 1 m/s  E/E': 22 8  LVOT Env  Ti: 365 4 ms  LVOT VTI: 28 2 cm  LVOT Vmax: 0 9 m/s  LVOT Vmean: 0 8 m/s  LVOT maxPG: 3 6 mmHg  LVOT meanP 5 mmHg  LVSV Dopp: 77 2 ml  MV A Evens: 0 8 m/s  MV Dec [place]: 3 6 m/s2  MV DecT: 354 5 ms  MV E Evens: 1 3 m/s  MV E/A Ratio: 1 6  MV PHT: 102 8 ms  MVA By PHT: 2 1 cm2    [facility]chocardi[facility]    Prepared and electronically signed by    Stefano Cantu MD  Signed 16-Aug-2020 15:02:20       No results found for this or any previous visit    Results for orders placed during the hospital encounter of 08/15/20   Cardiac coronary angio/lhc/pci    Narrative [address]  [address]  [phone]    [facility]    Invasive Cardiovascular Lab Complete Report    Patient: Tiffany Hector  MR number: [id number]  Account number: [de-identified]  Study date: 2020  Gender: Male  : 1955  Height: 63 in  Weight: 189 6 lb  BSA: 1 89 mï¾²    Allergies: IODINATED DIAGNOSTIC AGENTS    Diagnostic Cardiologist:  Ivonne Daley MD    SUMMARY    CORONARY CIRCULATION:  The coronary circulation is right dominant  LAD: The vessel was large sized  Proximal LAD 50% stenosis(seen in LE/Turkish CAU view) s/p negative iFR 0 96  Mid LAD has focal 50% stenosis at the origin of D2 s/p positive iFR 0 88 with brisk flow distally  D2 small vessel with proximal 50% stenosis  Circumflex: The vessel was large sized  Mid % occluded  OM2 fills from right to left collaterals  OM1 is medium caliber long vessel with luminal irreguilarities  RCA: The vessel was normal sized  Angiography showed mild atherosclerosis  Right to left collateral visualized filling OM    INDICATIONS:  Possible CAD: myocardial infarction without ST elevation (NSTEMI)  IMPRESSIONS:  Mid % occlusion and distally it fills from right to left collaterals  Proximal LAD 50% stenosis s/p negative iFR - 0 96  Focal mid LAD 50% stenosis s/p positive iFR - 0 88  Mild atherosclerosis of proximal RCA  Severe aortic stenosis by echo  Mild pulmonary hypertension , elevated LVEDP    RECOMMENDATIONS:  CT surgery evaluation    DISCHARGE AND FOLLOW UP:  The patient left the catheterization laboratory in stable condition  INDICATIONS:  --  Possible CAD: myocardial infarction without ST elevation (NSTEMI)  --  Cardiac: aortic valve disease  PROCEDURES PERFORMED    --  Right heart catheterization  --  Left heart catheterization without ventriculogram   --  Left coronary angiography  --  Right coronary angiography  --  Diagnostic myocardial fractional flow reserve  --  Inpatient  --  Cardiac output/Weston method  --  Oxygen saturation sampling   --  Oxygen saturation sampling  --  Mod Sedation Same Physician Initial 15min  --  Mod Sedation Same Physician Add 15min  --  Mod Sedation Same Physician Add 15min    --  Mod Sedation Same Physician Add 15min  --  Coronary Catheterization (/ [facility])  --  Myocardial Flow Reserve  --  Right Heart Catheterization  PROCEDURE: The risks and alternatives of the procedures and conscious sedation were explained to the patient and informed consent was obtained  The patient was brought to the cath lab and placed on the table  The planned puncture sites  were prepped and draped in the usual sterile fashion  --  Right radial artery access  After performing an Kishor's test to verify adequate ulnar artery supply to the hand, the radial site was prepped  The puncture site was infiltrated with local anesthetic  The vessel was accessed using the  modified Seldinger technique, a wire was advanced into the vessel, and a sheath was advanced over the wire into the vessel  --  Right brachial vein access  The puncture site was infiltrated with local anesthetic  The vessel was accessed using the modified Seldinger technique, a wire was advanced into the vessel, and a sheath was advanced over the wire into the  vessel  --  Right heart catheterization  A [place] Eli catheter was advanced under fluoroscopic guidance into the right heart and intracardiac pressures were obtained  --  Left heart catheterization without ventriculogram  A catheter was advanced over a guidewire into the ascending aorta  After recording ascending aortic pressure, the catheter was advanced across the aortic valve and left ventricular  pressure was recorded  The catheter was pulled back across the aortic valve and into the ascending aorta and pullback pressures were obtained  --  Left coronary artery angiography  A catheter was advanced over a guidewire into the aorta and positioned in the left coronary artery ostium under fluoroscopic guidance  Angiography was performed  --  Right coronary artery angiography   A catheter was advanced over a guidewire into the aorta and positioned in the right coronary artery ostium under fluoroscopic guidance  Angiography was performed  --  Myocardial fractional flow reserve  Flow reserve was measured using a 0 014" pressure-monitoring guide-wire  Steady baseline values were obtained  Mean arterial pressure and mean distal coronary pressures were then obtained at maximum  hyperemia  --  Inpatient  --  Cardiac output/Weston method  Blood samples were obtained and measurements of arterial and venous oxygen saturations were made and cardiac output measurements were obtained using the Weston equation  --  Oxygen saturation sampling  Blood samples were obtained from multiple chambers and oxygen saturations measured and intra-cardiac shunt calculated  --  Oxygen saturation sampling  Blood samples were obtained from multiple chambers and oxygen saturations measured and intra-cardiac shunt calculated  --  Mod Sedation Same Physician Initial 15min  --  Mod Sedation Same Physician Add 15min  --  Mod Sedation Same Physician Add 15min  --  Mod Sedation Same Physician Add 15min  --  Coronary Catheterization (w/ LH)  --  Myocardial Flow Reserve  --  Right Heart Catheterization  LESION INTERVENTION:    --  Steady baseline values were obtained  Mean arterial pressure and mean distal coronary pressures were then obtained at maximum hyperemia  Based on the results, the lesion was judged to be significant  LM was engaged using JL3 5mm guide  Mid LAD ifr positive 0 88  Wire was renormalized and average iFR 0 88 which is positive  There was pressure jump in ifR pull back in mid LAD  Proximal LAD iFR negative 0 96  PROCEDURE COMPLETION: The patient tolerated the procedure well and was discharged from the cath lab  TIMING: Test started at 10:37  Test concluded at 11:54  HEMOSTASIS: The sheath was removed  The site was compressed manually  Hemostasis  was obtained  The sheath was removed  The site was compressed with a Hemoband device  Hemostasis was obtained   MEDICATIONS GIVEN: Hydrocortisone 100mg/ml, 100 mg, IV, at 10:38  Versed (2mg/2ml), 1 mg, IV, at 10:44  Fentanyl (1OOmcg/2 ml),  25 mcg, IV, at 10:44  1% Lidocaine, 2 ml, subcutaneously, at 10:49  Nitroglycerin (200mcg/ml), 200 mcg, at 10:51  Heparin 1000 units/ml, 5,000 units, IV, at 10:55  Heparin 1000 units/ml, 2,500 units, IV, at 11:13  1% Lidocaine, 2 ml,  subcutaneously, at 11:31  CONTRAST GIVEN: 100 ml Visipaque-(320mg I /ml)  RADIATION EXPOSURE: Fluoroscopy time: 7 05 min  HEMODYNAMICS: Right heart catheterization done via right brachial vein access using swan catheter    VALVES:  AORTIC VALVE:   --  There was severe aortic stenosis by Echo    CORONARY VESSELS:   --  The coronary circulation is right dominant  --  Left main: The vessel was normal sized  Angiography showed no evidence of disease  --  LAD: The vessel was large sized  Proximal LAD 50% stenosis(seen in LE/Armenian CAU view) s/p negative iFR 0 96  Mid LAD has focal 50% stenosis at the origin of D2 s/p positive iFR 0 88 with brisk flow distally  D2 small vessel with proximal 50% stenosis  --  Circumflex: The vessel was large sized  Mid % occluded  OM2 fills from right to left collaterals  OM1 is medium caliber long vessel with luminal irreguilarities  --  RCA: The vessel was normal sized  Angiography showed mild atherosclerosis  Right to left collateral visualized filling OM    IMPRESSIONS:  Mid % occlusion and distally it fills from right to left collaterals  Proximal LAD 50% stenosis s/p negative iFR - 0 96  Focal mid LAD 50% stenosis s/p positive iFR - 0 88  Mild atherosclerosis of proximal RCA  Severe aortic stenosis by echo  Mild pulmonary hypertension , elevated LVEDP    RECOMMENDATIONS  CT surgery evaluation    DISPOSITION:  The patient left the catheterization laboratory in stable condition      Prepared and signed by    Saleem Solorzano MD  Signed 08/17/2020 12:24:40    Study diagram    Hemodynamic tables    Pressures:  Baseline  Pressures:  - HR: 61  Pressures:  - Rhythm:  Pressures:  -- Aortic Pressure (S/D/M): 170/80/111  Pressures:  -- Arterial (S/D/M): 174/70/106  Pressures:  -- Pulmonary Artery (S/D/M): 74/28/41  Pressures:  -- Pulmonary Capillary Wedge: 45/50/35  Pressures:  -- Right Atrium (a/v/M): 19/17/15  Pressures:  -- Right Ventricle (s/edp): 71/29/--    O2 Sats:  Baseline  O2 Sats:  - HR: 59  O2 Sats:  - Rhythm:  O2 Sats:  -- AO: 14  55  O2 Sats:  -- PA: 14  83    Outputs:  Baseline  Outputs:  -- CALCULATIONS: [age]  Outputs:  -- CALCULATIONS: Body Surface Area: 1 89  Outputs:  -- CALCULATIONS: Height in cm: 160 00  Outputs:  -- CALCULATIONS: Sex: Male  Outputs:  -- CALCULATIONS: Weight in k 20  Outputs:  -- OUTPUTS: CO by Weston: 3 67  Outputs:  -- OUTPUTS: Weston cardiac index: 1 94  Outputs:  -- OUTPUTS: Weston HR: 55 00  Outputs:  -- OUTPUTS: O2 consumption: 209 83  Outputs:  -- OUTPUTS: Vo2 Indexed: 110 90  Outputs:  -- RESISTANCES: Left ventricular stroke work: 63 21  Outputs:  -- RESISTANCES: Left Ventricular Stroke Work index: 33 41  Outputs:  -- RESISTANCES: Pulmonary vascular index (dsc): 247 46  Outputs:  -- RESISTANCES: Pulmonary vascular index (Wood Units): 3 09  Outputs:  -- RESISTANCES: Pulmonary vascular resistance (dsc): 130 78  Outputs:  -- RESISTANCES: Pulmonary vascular resistance (Wood Units): 1 64  Outputs:  -- RESISTANCES: PVR_SVR Ratio: 0 06  Outputs:  -- RESISTANCES: Right ventricular stroke work: 34 14  Outputs:  -- RESISTANCES: Right ventricular stroke work index: 18 05  Outputs:  -- RESISTANCES: Systemic vascular index (dsc): 3959 28  Outputs:  -- RESISTANCES: Systemic vascular index (Wood Units): 49 50  Outputs:  -- RESISTANCES: Systemic vascular resistance (dsc): 2092 53  Outputs:  -- RESISTANCES: Systemic vascular resistance (Wood Units): 26 16  Outputs:  -- RESISTANCES: Total pulmonary index (dsc): 1690 94  Outputs:  -- RESISTANCES: Total pulmonary index (Wood Units): 21 14  Outputs: -- RESISTANCES: Total pulmonary resistance (dsc): 893 69  Outputs:  -- RESISTANCES: Total pulmonary resistance (Wood Units): 11 17  Outputs:  -- RESISTANCES: Total vascular index (Wood Units): 57 24  Outputs:  -- RESISTANCES: Total vascular resistance (dsc): 2419 49  Outputs:  -- RESISTANCES: Total vascular resistance (Wood Units): 30 25  Outputs:  -- RESISTANCES: Total vascular resistance index (dsc): 4577 92  Outputs:  -- RESISTANCES: TPR_TVR Ratio: 0 37  Outputs:  -- SHUNTS: Pulmonary flow: 3 67  Outputs:  -- SHUNTS: Qp Indexed: 1 94  Outputs:  -- SHUNTS: Qs Indexed: 1 94  Outputs:  -- SHUNTS: Systemic flow: 3 67       Meds/Allergies   all current active meds have been reviewed  No medications prior to admission  Assessment:  Principal Problem:    NSTEMI (non-ST elevated myocardial infarction) [facility])  Active Problems: Aortic stenosis    HTN (hypertension)    HLD (hyperlipidemia)    CAD (coronary artery disease)    S/P CABG (coronary artery bypass graft)    S/P AVR (aortic valve replacement)    Thrombocytopenia (HCC)    Hypocalcemia    Irritation of right eye    Counseling / Coordination of Care  Total floor / unit time spent today 20 minutes  Greater than 50% of total time was spent with the patient and / or family counseling and / or coordination of care  ** Please Note: Dragon 360 Dictation voice to text software may have been used in the creation of this document   **

## 2020-08-21 NOTE — PROGRESS NOTES
Progress Note - Cardiothoracic Surgery   Gabbi Belcher 72 y o  male MRN: [id number]  Unit/Bed#: [facility] 419-01 Encounter: [id number]    ortic stenosis, Non-Rheumatic, Coronary artery disease  S/P aortic valve replacement and coronary artery bypass grafting; POD # 2      24 Hour Events: Transferred to Stepdown yesterday  Developed rapid Afib last evening  Denies CP, SOB, palpitations       Medications:   Scheduled Meds:  Current Facility-Administered Medications   Medication Dose Route Frequency Provider Last Rate    acetaminophen  650 mg Oral Q4H PRN Paola Betancur PA-C      acetaminophen  975 mg Oral Q8H Paola Betancur PA-C      aspirin  325 mg Oral Daily Paola Betancur PA-C      atorvastatin  80 mg Oral Daily With Dinner Paola Betancur PA-C      bisacodyl  10 mg Rectal Daily PRN Paola Betancur PA-C      chlorhexidine  15 mL Swish & Spit BID Paola Betancur PA-C      docusate sodium  100 mg Oral BID Paola Betancur PA-C      fluorescein sodium sterile  1 strip Right Eye Once Paola Betancur PA-C      fondaparinux  2 5 mg Subcutaneous Daily Paola Betancur PA-C      furosemide  40 mg Intravenous Daily Paola Betancur PA-C      glycerin-hypromellose-  1 drop Both Eyes Q6H PRN Paola Betancur PA-C      glycerin-hypromellose-  1 drop Right Eye Q6H PRN Paola Betancur PA-C      insulin lispro  1-5 Units Subcutaneous HS Paola Betancur PA-C      insulin lispro  1-6 Units Subcutaneous TID AC Paola Betancur PA-C      lidocaine (cardiac)  100 mg Intravenous Q30 Min PRN Paola Betancur PA-C      metoprolol tartrate  25 mg Oral Q12H [facility] Paola Betancur PA-C      mupirocin  1 application Nasal A25T [facility] Paola Betancur PA-C      ondansetron  4 mg Intravenous Q6H PRN Paola Betancur PA-C      oxyCODONE  2 5 mg Oral Q4H PRN Paola Betancur PA-C      oxyCODONE  5 mg Oral Q4H PRN Paola Betancur PA-C      pantoprazole  40 mg Oral Daily Paola Betancur PA-C      polyethylene glycol  17 g Oral Daily Nakita Diamond PA-C      potassium chloride  20 mEq Oral Daily Nakita Diamond PA-C      temazepam  15 mg Oral HS PRN Nakita Diamond PA-C       Continuous Infusions:   PRN Meds:   acetaminophen    bisacodyl    glycerin-hypromellose-    glycerin-hypromellose-    lidocaine (cardiac)    ondansetron    oxyCODONE    oxyCODONE    temazepam    Vitals:   Vitals:    08/20/20 2300 08/21/20 0300 08/21/20 0331 08/21/20 0701   BP: 110/74 105/64  111/69   BP Location: Right arm Right arm  Left arm   Pulse: 86 61  77   Resp: 18 18  18   Temp: 98 6 °F (37 °C) 98 2 °F (36 8 °C)  98 °F (36 7 °C)   TempSrc: Oral Oral  Oral   SpO2: 96% 95%     Weight:   79 4 kg (175 lb 0 7 oz)    Height:           Telemetry: Atrial Fibrillation; Heart Rate: 110    Respiratory:   SpO2: SpO2: 95 %; Room Air    Intake/Output:   I/O       08/19 0701 - 08/20 0700 08/20 0701 - 08/21 0700 08/21 0701 - 08/22 0700    P  O   100 180    I V  (mL/kg) 2928 8 (35) 124 8 (1 6)     NG/GT 0      IV Piggyback 1375 50     Cell Saver 600      Total Intake(mL/kg) 4903 8 (58 6) 274 8 (3 5) 180 (2 3)    Urine (mL/kg/hr) 3310 (1 6) 1350 (0 7)     Drains 10      Blood 600      Chest Tube 280 106     Total Output 4200 1456     Net +703 8 -1181 2 +180                 Chest tube Output:    Mediastinal tubes: 20 mL/8 hours  106 mL/24 hours          Weights:   Weight (last 2 days)     Date/Time   Weight    08/21/20 0331   79 4 (175 05)    08/20/20 0600   83 7 (184 53)            Admit weight: 83 kg    Results:   Results from last 7 days   Lab Units 08/21/20  0443 08/20/20  0424 08/20/20  0031 08/19/20  1708  08/19/20  0620   WBC Thousand/uL 14 39* 13 53*  --   --   --  11 30*   HEMOGLOBIN g/dL 12 8 12 9 13 1 13 0  --  15 2   I STAT HEMOGLOBIN   --   --   --   --    < >  --    HEMATOCRIT % 39 1 39 2 40 0 39 1  --  48 0   HEMATOCRIT, ISTAT   --   --   --   --    < >  --    PLATELETS Thousands/uL 152 123*  --  112*   < > 200    < > = values in this interval not displayed  Results from last 7 days   Lab Units 08/21/20  0443 08/20/20  2047 08/20/20  0424  08/19/20  1707   SODIUM mmol/L 136 138 138   < >  --    POTASSIUM mmol/L 4 2 3 7 4 2   < >  --    CHLORIDE mmol/L 104 105 106   < >  --    CO2 mmol/L 29 28 25   < >  --    CO2, I-STAT mmol/L  --   --   --   --  23   BUN mg/dL 13 15 11   < >  --    CREATININE mg/dL 0 78 0 87 0 67   < >  --    GLUCOSE, ISTAT mg/dl  --   --   --   --  114   CALCIUM mg/dL 7 9* 7 4* 7 1*   < >  --     < > = values in this interval not displayed  Results from last 7 days   Lab Units 08/19/20  0706 08/18/20 2024 08/18/20  1137  08/17/20  2123  08/16/20  0011   INR   --   --   --   --  0 99  --  1 11   PTT seconds 56* 53* 91*   < > 24   < > 80*    < > = values in this interval not displayed  Point of care glucose:  - 152  No SSIC required    Studies:  No studies past 24 hrs        Invasive Lines/Tubes:  Invasive Devices     Central Venous Catheter Line            CVC Central Lines 08/19/20 Triple 1 day          Peripheral Intravenous Line            Peripheral IV 08/19/20 Right Hand 1 day          Line            Pacer Wires 1 day    Pacer Wires 1 day          Drain            Chest Tube 1 Anterior Mediastinal 32 Fr  1 day    Chest Tube 2 Left Mediastinal 32 Fr  1 day                Physical Exam:    HEENT/NECK:  PERRLA  No jugular venous distention  Cardiac: Regular rate and rhythm and No murmurs/rubs/gallops  Pulmonary:  Breath sounds clear bilaterally and No rales/rhonchi/wheezes  Abdomen:  Non-tender, Non-distended and Hypo-active bowel sounds  Incisions: Sternum is stable  Incision dressed with Acticoat  No erythema or drainage and Saphenectomy incision dressed with Acticoat    No erythema or drainage  Extremities: Extremities warm/dry and Trace edema B/L  Neuro: Alert and oriented X 3, Sensation is grossly intact and No focal deficits  Skin: Warm/Dry, without rashes or lesions  Assessment:  Principal Problem:    NSTEMI (non-ST elevated myocardial infarction) [facility])  Active Problems: Aortic stenosis    HTN (hypertension)    HLD (hyperlipidemia)    CAD (coronary artery disease)    S/P CABG (coronary artery bypass graft)    S/P AVR (aortic valve replacement)    Thrombocytopenia (HCC)    Hypocalcemia    Irritation of right eye       ortic stenosis, Non-Rheumatic, Coronary artery disease  S/P aortic valve replacement and coronary artery bypass grafting; POD # 2    Plan:    1  Cardiac:   Atrial Fibrillation; Tachycardic  Increase Lopressor, 25mg PO q 6 hours  Discussed with Cardiology  If remains tachycardic, consider initiation of Diltizam gtt  Continue ASA and Statin therapy  Epicardial pacing wires no longer required  Remove today  Maintain central IV access today for access  Continue DVT prophylaxis    2  Pulmonary:   Good Room air oxygen saturation; Continue incentive spirometry/Coughing/Deep breathing exercises  Chest tube drainage diminished; D/C today    3  Renal:   Intake/Output net: -1181 mL/24 hours  Continue diuresis   Lasix 40 mg IV QD  Potassium Chloride 20 mEq PO QD  Post op Creatinine stable; Follow up labs prn    4  Neuro:  Neurologically intact; No active issues  Incisional pain well-controlled  Continue Tylenol, 975 mg PO q 8, standing dose  Continue Oxycodone, 2 5 to 5 mg PO q 4 hours prn pain    5  GI:  Tolerating TLC 2 3 gm sodium diet  Maintain 1800 mL daily fluid restriction   Continue stool softeners and prn suppository  Continue GI prophylaxis    6  Endo:   No history of diabetes; Glucose well-controlled with sliding scale coverage    7    Hematology:    Post-operative blood count acceptable; Trend prn and Leukocytosis - afebrile, monitor      8    Disposition:      Follow daily PT/OT recommendations regarding home vs  rehab when medically cleared for discharge    VTE Pharmacologic Prophylaxis: Fondaparinux (Arixtra)  VTE Mechanical Prophylaxis: sequential compression device    Collaborative rounds completed with SIMONE Currie    Plan of care discussed with bedside nurse    SIGNATURE: Tangela Rutherford PA-C  DATE: August 21, 2020  TIME: 9:31 AM

## 2020-08-21 NOTE — PROGRESS NOTES
2030 pt noted to be in a-fib 110's  BP stable 114/67, asymptomatic  12 lead ekg obtained  Critical care NP Linda Best aware and ordered BMP and mag  Scheduled metoprolol given, told to call cards should pt develop rapid afib  Pt stable will continue to monitor

## 2020-08-22 VITALS
BODY MASS INDEX: 31.8 KG/M2 | SYSTOLIC BLOOD PRESSURE: 136 MMHG | TEMPERATURE: 98 F | DIASTOLIC BLOOD PRESSURE: 80 MMHG | HEART RATE: 68 BPM | RESPIRATION RATE: 18 BRPM | OXYGEN SATURATION: 98 % | WEIGHT: 179.45 LBS | HEIGHT: 63 IN

## 2020-08-22 LAB
ANION GAP SERPL CALCULATED.3IONS-SCNC: 6 MMOL/L (ref 4–13)
BUN SERPL-MCNC: 16 MG/DL (ref 5–25)
CALCIUM SERPL-MCNC: 8.1 MG/DL (ref 8.3–10.1)
CHLORIDE SERPL-SCNC: 104 MMOL/L (ref 100–108)
CO2 SERPL-SCNC: 27 MMOL/L (ref 21–32)
CREAT SERPL-MCNC: 0.82 MG/DL (ref 0.6–1.3)
GFR SERPL CREATININE-BSD FRML MDRD: 93 ML/MIN/1.73SQ M
GLUCOSE SERPL-MCNC: 104 MG/DL (ref 65–140)
GLUCOSE SERPL-MCNC: 114 MG/DL (ref 65–140)
GLUCOSE SERPL-MCNC: 123 MG/DL (ref 65–140)
INR PPP: 1.09 (ref 0.84–1.19)
POTASSIUM SERPL-SCNC: 4.2 MMOL/L (ref 3.5–5.3)
PROTHROMBIN TIME: 14.2 SECONDS (ref 11.6–14.5)
SODIUM SERPL-SCNC: 137 MMOL/L (ref 136–145)

## 2020-08-22 PROCEDURE — 82948 REAGENT STRIP/BLOOD GLUCOSE: CPT

## 2020-08-22 PROCEDURE — 80048 BASIC METABOLIC PNL TOTAL CA: CPT | Performed by: PHYSICIAN ASSISTANT

## 2020-08-22 PROCEDURE — 99024 POSTOP FOLLOW-UP VISIT: CPT | Performed by: PHYSICIAN ASSISTANT

## 2020-08-22 PROCEDURE — 99232 SBSQ HOSP IP/OBS MODERATE 35: CPT | Performed by: INTERNAL MEDICINE

## 2020-08-22 PROCEDURE — 99024 POSTOP FOLLOW-UP VISIT: CPT | Performed by: THORACIC SURGERY (CARDIOTHORACIC VASCULAR SURGERY)

## 2020-08-22 PROCEDURE — 85610 PROTHROMBIN TIME: CPT | Performed by: PHYSICIAN ASSISTANT

## 2020-08-22 RX ORDER — ATORVASTATIN CALCIUM 80 MG/1
80 TABLET, FILM COATED ORAL
Qty: 30 TABLET | Refills: 2 | Status: SHIPPED | OUTPATIENT
Start: 2020-08-22 | End: 2020-11-27

## 2020-08-22 RX ORDER — ASPIRIN 325 MG
325 TABLET ORAL DAILY
Qty: 100 TABLET | Refills: 0 | Status: SHIPPED | OUTPATIENT
Start: 2020-08-23 | End: 2020-09-08

## 2020-08-22 RX ORDER — POTASSIUM CHLORIDE 20 MEQ/1
20 TABLET, EXTENDED RELEASE ORAL DAILY
Qty: 7 TABLET | Refills: 1 | Status: SHIPPED | OUTPATIENT
Start: 2020-08-22 | End: 2022-04-05

## 2020-08-22 RX ORDER — POLYETHYLENE GLYCOL 3350 17 G/17G
17 POWDER, FOR SOLUTION ORAL DAILY PRN
Qty: 14 EACH | Refills: 0 | Status: SHIPPED | OUTPATIENT
Start: 2020-08-22

## 2020-08-22 RX ORDER — OXYCODONE HYDROCHLORIDE 5 MG/1
TABLET ORAL
Qty: 30 TABLET | Refills: 0 | Status: SHIPPED | OUTPATIENT
Start: 2020-08-22 | End: 2020-09-24 | Stop reason: ALTCHOICE

## 2020-08-22 RX ORDER — DOCUSATE SODIUM 100 MG/1
100 CAPSULE, LIQUID FILLED ORAL 2 TIMES DAILY
Qty: 60 CAPSULE | Refills: 0 | Status: SHIPPED | OUTPATIENT
Start: 2020-08-22 | End: 2020-09-21

## 2020-08-22 RX ORDER — ACETAMINOPHEN 325 MG/1
650 TABLET ORAL EVERY 6 HOURS PRN
Qty: 30 TABLET | Refills: 0 | Status: SHIPPED | OUTPATIENT
Start: 2020-08-22 | End: 2020-09-24 | Stop reason: ALTCHOICE

## 2020-08-22 RX ORDER — TORSEMIDE 20 MG/1
20 TABLET ORAL DAILY
Qty: 7 TABLET | Refills: 1 | Status: SHIPPED | OUTPATIENT
Start: 2020-08-22 | End: 2022-04-05

## 2020-08-22 RX ORDER — METOPROLOL TARTRATE 50 MG/1
50 TABLET, FILM COATED ORAL EVERY 12 HOURS SCHEDULED
Status: DISCONTINUED | OUTPATIENT
Start: 2020-08-22 | End: 2020-08-22 | Stop reason: HOSPADM

## 2020-08-22 RX ORDER — OMEPRAZOLE 20 MG/1
20 CAPSULE, DELAYED RELEASE ORAL DAILY
Qty: 30 CAPSULE | Refills: 0 | Status: SHIPPED | OUTPATIENT
Start: 2020-08-22 | End: 2020-09-21

## 2020-08-22 RX ORDER — METOPROLOL TARTRATE 50 MG/1
50 TABLET, FILM COATED ORAL EVERY 12 HOURS SCHEDULED
Qty: 60 TABLET | Refills: 2 | Status: SHIPPED | OUTPATIENT
Start: 2020-08-22 | End: 2020-12-21 | Stop reason: SDUPTHER

## 2020-08-22 RX ADMIN — CHLORHEXIDINE GLUCONATE 0.12% ORAL RINSE 15 ML: 1.2 LIQUID ORAL at 10:13

## 2020-08-22 RX ADMIN — ACETAMINOPHEN 975 MG: 325 TABLET, FILM COATED ORAL at 10:13

## 2020-08-22 RX ADMIN — FONDAPARINUX SODIUM 2.5 MG: 2.5 INJECTION, SOLUTION SUBCUTANEOUS at 10:13

## 2020-08-22 RX ADMIN — POTASSIUM CHLORIDE 20 MEQ: 1500 TABLET, EXTENDED RELEASE ORAL at 10:13

## 2020-08-22 RX ADMIN — POLYETHYLENE GLYCOL 3350 17 G: 17 POWDER, FOR SOLUTION ORAL at 10:21

## 2020-08-22 RX ADMIN — METOPROLOL TARTRATE 25 MG: 25 TABLET, FILM COATED ORAL at 05:43

## 2020-08-22 RX ADMIN — MUPIROCIN 1 APPLICATION: 20 OINTMENT TOPICAL at 10:12

## 2020-08-22 RX ADMIN — PANTOPRAZOLE SODIUM 40 MG: 40 TABLET, DELAYED RELEASE ORAL at 10:13

## 2020-08-22 RX ADMIN — DOCUSATE SODIUM 100 MG: 100 CAPSULE, LIQUID FILLED ORAL at 10:12

## 2020-08-22 RX ADMIN — FUROSEMIDE 40 MG: 10 INJECTION, SOLUTION INTRAMUSCULAR; INTRAVENOUS at 10:13

## 2020-08-22 RX ADMIN — ASPIRIN 325 MG ORAL TABLET 325 MG: 325 PILL ORAL at 10:13

## 2020-08-22 NOTE — DISCHARGE SUMMARY
Discharge Summary - Cardiothoracic Surgery   Shamika Aranda 72 y o  male MRN: [id number]  Unit/Bed#: [facility] 419-01 Encounter: [id number]    Admission Date: 8/17/2020     Discharge Date: 08/22/20    Admitting Diagnosis: Chest pain [R07 9]    Primary Discharge Diagnosis:   Aortic regurgitation, Aortic stenosis, Non-Rheumatic, Coronary artery disease  S/P aortic valve replacement and coronary artery bypass grafting;    Secondary Discharge Diagnosis:   severe AS, HTN, HLD      Attending: SIMONE Emerson  Consulting Physician(s):   Cardiology  Medical/Critical Care  OMFS    Procedures Performed:   Procedure(s):  CORONARY ARTERY BYPASS GRAFT (CABG) x 1; Left EVH/SVG to OM2; AVR with Morrison 21 mm Inspiris Tissue Valve  TRANSESOPHAGEAL ECHOCARDIOGRAM (PHOEBE)     Hospital Course:   8/17: 73 y/o CM presents to [facility] ED 8/15 w/ c/o substernal CP x1 day w/ radiation to b/l arms  (+) troponin, (+) EKG changes  Dx NSTEMI & admitted for w/u  TTE w/ preserved EF & severe AS/moderate AI  Cardiac cath w/ MV CAD  Awaiting transfer to [facility] for cardiac sx consultation  8/18: Seen in consultation  Preop orders placed  Panorex ordered  Periapical lucency noted  OMFS consulted  Recommend outpatient follow-up, as there is no need for intervention currently  Preop orders placed for OR tomorrow   8/19: CABG x1, AVR (#21mm Morrison Inspiris)  Transferred to ICU supported with Cardene  No significant postoperative bleeding  Wean towards extubation  8/20: Extubated to 4LNC, wean as tolerated  Amio held for Iodine allergy, continue to hold  Delined  Given 1L LR & 250cc albumin  Tmax 101 3 QHS, resolved s/p Tylenol, afebrile this AM  Started on cardene for HTN, now at 5, start Lopressor 25mg BID, wean cardene to off, discontinue a line  Start Lasxi 40mg IV QDay, discontinue maravilla catheter  Discontinue KACIE drain  C/o right eye irritation, cornea clear of debris, saline flush/wash & add eye gtts  Discontinue insulin gtt, transition to [facility]  Transfer to stepdown  8/21: Developed Afib with RVR last evening  Increase Metoprolol 25mg q6 and give IV Metoprolol 2 5mg IV x 1  Start Coumadin 5mg tonight  Increase Lasix and KCl to BID  D/C CT/EPW    8/22: Converted to NSR at approx 1600 yesterday and remains NSR  RA, HR 65  Change Metoprolol to 50mg q12  Deemed stable for discharge home today  Condition at Discharge:   good     Discharge Physical Exam:    HEENT/NECK:  PERRLA  No jugular venous distention  Cardiac: Regular rate and rhythm and No murmurs/rubs/gallops  Pulmonary:  Breath sounds clear bilaterally and No rales/rhonchi/wheezes  Abdomen:  Non-tender, Non-distended and Normal bowel sounds  Incisions: Sternum is stable  Incision is clean, dry, and intact  and Saphenectomy incison is clean, dry, and intact  Extremities: Extremities warm/dry and Trace edema B/L  Neuro: Alert and oriented X 3, Sensation is grossly intact and No focal deficits  Skin: Warm/Dry, without rashes or lesions  Discharge Data:  Results from last 7 days   Lab Units 08/21/20  0443 08/20/20  0424 08/20/20  0031 08/19/20  1708  08/19/20  0620   WBC Thousand/uL 14 39* 13 53*  --   --   --  11 30*   HEMOGLOBIN g/dL 12 8 12 9 13 1 13 0  --  15 2   I STAT HEMOGLOBIN   --   --   --   --    < >  --    HEMATOCRIT % 39 1 39 2 40 0 39 1  --  48 0   HEMATOCRIT, ISTAT   --   --   --   --    < >  --    PLATELETS Thousands/uL 152 123*  --  112*   < > 200    < > = values in this interval not displayed  Results from last 7 days   Lab Units 08/22/20  0543 08/21/20  0443 08/20/20  2047  08/19/20  1707   POTASSIUM mmol/L 4 2 4 2 3 7   < >  --    CHLORIDE mmol/L 104 104 105   < >  --    CO2 mmol/L 27 29 28   < >  --    CO2, I-STAT mmol/L  --   --   --   --  23   BUN mg/dL 16 13 15   < >  --    CREATININE mg/dL 0 82 0 78 0 87   < >  --    GLUCOSE, ISTAT mg/dl  --   --   --   --  114   CALCIUM mg/dL 8 1* 7 9* 7 4*   < >  --     < > = values in this interval not displayed       Results from last 7 days   Lab Units 08/22/20  0543 08/21/20  1035 08/19/20  0706 08/18/20 2024 08/18/20  1137  08/17/20  2123   INR  1 09 1 09  --   --   --   --  0 99   PTT seconds  --   --  56* 53* 91*   < > 24    < > = values in this interval not displayed  Discharge instructions/Information to patient and family:   See after visit summary for information provided to patient and family  Fabiola Carcamo was educated on restrictions regarding driving and lifting, and techniques of proper incisional care  They were specifically counselled on signs and symptoms of an incisional infection, and advised to contact our service immediately should they develop fevers, sweats, chill, redness or drainage at the site of any incisions  Provisions for Follow-Up Care:  See after visit summary for information related to follow-up care and any pertinent home health orders  Disposition:  Home    Planned Readmission:   No    Discharge Medications:  See after visit summary for reconciled discharge medications provided to patient and family  Fabiola Carcamo was provided contact information and scheduled a follow up appointment with SIMONE Jackson  Additionally, follow up appointments have been scheduled for their primary care physician and primary cardiologist   Contact information was provided  Upon admission, troponins were Positive for NSTEMI, with level > 0 02  Fabiola Carcamo was counseled on the importance of avoiding tobacco products  As with all patients whom have undergone open heart surgery, tobacco cessation medication was contraindicated at the time of discharge  ACE/ARB was Contraindicated secondary to EF > 40% and no history of heart failure   Amiodarone was contraindicated secondary to iodine allergy  The patient was discharged on ongoing diuretic therapy with Torsemide 20 mg, PO QD and Potassium Chloride 20 mEq, PO QD    They were advised to continue these medications for 7 days, unless otherwise directed  Narcotic pain medication was prescribed in the form of Oxycodone  Prior to prescribing, their prescription profile was reviewed on the [place] department of health prescription drug monitoring program     The patient was informed that following their postoperative surgical evaluation, they will be referred to outpatient cardiac rehabilitation  They were counseled that this program is run by specialists who will help them safely strengthen their heart and prevent more heart disease  Cardiac rehabilitation will include exercise, relaxation, stress management, and heart-healthy nutrition  Caregivers will also check to make sure their medication regimen is working  I spent 30 minutes discharging the patient  This time was spent on the day of discharge  I had direct contact with the patient on the day of discharge  Additional documentation is required if more than 30 minutes were spent on discharge       SIGNATURE: Lore Hobson PA-C  DATE: August 22, 2020  TIME: 1:10 PM

## 2020-08-22 NOTE — PROGRESS NOTES
Progress Note - Cardiothoracic Surgery   Madison Harry 72 y o  male MRN: [id number]  Unit/Bed#: [facility] 419-01 Encounter: [id number]    ortic stenosis, Non-Rheumatic, Coronary artery disease  S/P aortic valve replacement and coronary artery bypass grafting; POD # 3    24 Hour Events: Downgraded to telemetry yesterday  Converted NSR at approx 1600 yesterday, remains NSR currently  Feels well, denies CP or SOB      Medications:   Scheduled Meds:  Current Facility-Administered Medications   Medication Dose Route Frequency Provider Last Rate    acetaminophen  650 mg Oral Q4H PRN Purnima Courts, PA-C      acetaminophen  975 mg Oral Q8H Purnima Courts, PA-C      aspirin  325 mg Oral Daily [facility], PA-C      atorvastatin  80 mg Oral Daily With Dinner [facility], PA-C      bisacodyl  10 mg Rectal Daily PRN [facility], PA-C      chlorhexidine  15 mL Swish & Spit BID [facility], PA-C      docusate sodium  100 mg Oral BID [facility], PA-C      fondaparinux  2 5 mg Subcutaneous Daily [facility], PA-C      furosemide  40 mg Intravenous BID (diuretic) Petrona Nick, PA-C      glycerin-hypromellose-  1 drop Both Eyes Q6H PRN [facility], PA-C      insulin lispro  1-5 Units Subcutaneous HS [facility], PA-C      insulin lispro  1-6 Units Subcutaneous TID AC [facility], PA-C      lidocaine (cardiac)  100 mg Intravenous Q30 Min PRN [facility], PA-C      metoprolol tartrate  25 mg Oral Q6H YENIFER Reddy      mupirocin  1 application Nasal [address] [facility], PA-C      ondansetron  4 mg Intravenous Q6H PRN [facility], PA-C      oxyCODONE  2 5 mg Oral Q4H PRN [facility], PA-C      oxyCODONE  5 mg Oral Q4H PRN [facility], PA-C      pantoprazole  40 mg Oral Daily [facility], PA-C      polyethylene glycol  17 g Oral Daily [facility], PA-C      potassium chloride  20 mEq Oral BID Petrona Nick, PA-C      temazepam  15 mg Oral HS PRN Nakita Diamond PA-C       Continuous Infusions:   PRN Meds:   acetaminophen    bisacodyl    glycerin-hypromellose-    lidocaine (cardiac)    ondansetron    oxyCODONE    oxyCODONE    temazepam    Vitals:   Vitals:    08/21/20 2110 08/21/20 2345 08/22/20 0324 08/22/20 0600   BP: 125/71 129/58 116/65    BP Location: Right arm Right arm Right arm    Pulse: 75 75 67    Resp: 18 18 18    Temp: 100 2 °F (37 9 °C) 98 6 °F (37 °C) 98 4 °F (36 9 °C)    TempSrc: Oral Oral Oral    SpO2: 93% 96% 97%    Weight:    81 4 kg (179 lb 7 3 oz)   Height:           Telemetry: NSR; Heart Rate: 65    Respiratory:   SpO2: SpO2: 97 %; Room Air    Intake/Output: -900 ml/24 hrs  I/O       08/19 0701 - 08/20 0700 08/20 0701 - 08/21 0700 08/21 0701 - 08/22 0700    P  O   100 180    I V  (mL/kg) 2928 8 (35) 124 8 (1 6)     NG/GT 0      IV Piggyback 1375 50     Cell Saver 600      Total Intake(mL/kg) 4903 8 (58 6) 274 8 (3 5) 180 (2 3)    Urine (mL/kg/hr) 3310 (1 6) 1350 (0 7)     Drains 10      Blood 600      Chest Tube 280 106     Total Output 4200 1456     Net +703 8 -1181 2 +180                 Weights:   Weight (last 2 days)     Date/Time   Weight    08/22/20 0600   81 4 (179 45)    08/21/20 0331   79 4 (175 05)    08/20/20 0600   83 7 (184 53)            Admit weight: 83 kg    Results:   Results from last 7 days   Lab Units 08/21/20  0443 08/20/20  0424 08/20/20  0031 08/19/20  1708  08/19/20  0620   WBC Thousand/uL 14 39* 13 53*  --   --   --  11 30*   HEMOGLOBIN g/dL 12 8 12 9 13 1 13 0  --  15 2   I STAT HEMOGLOBIN   --   --   --   --    < >  --    HEMATOCRIT % 39 1 39 2 40 0 39 1  --  48 0   HEMATOCRIT, ISTAT   --   --   --   --    < >  --    PLATELETS Thousands/uL 152 123*  --  112*   < > 200    < > = values in this interval not displayed       Results from last 7 days   Lab Units 08/22/20  0543 08/21/20  0443 08/20/20  2047  08/19/20  1707   SODIUM mmol/L 137 136 138   < >  --    POTASSIUM mmol/L 4 2 4 2 3 7 < >  --    CHLORIDE mmol/L 104 104 105   < >  --    CO2 mmol/L 27 29 28   < >  --    CO2, I-STAT mmol/L  --   --   --   --  23   BUN mg/dL 16 13 15   < >  --    CREATININE mg/dL 0 82 0 78 0 87   < >  --    GLUCOSE, ISTAT mg/dl  --   --   --   --  114   CALCIUM mg/dL 8 1* 7 9* 7 4*   < >  --     < > = values in this interval not displayed  Results from last 7 days   Lab Units 08/22/20  0543 08/21/20  1035 08/19/20  0706 08/18/20 2024 08/18/20  1137  08/17/20  2123   INR  1 09 1 09  --   --   --   --  0 99   PTT seconds  --   --  56* 53* 91*   < > 24    < > = values in this interval not displayed  Coumadin mg - 5 -  - -       Point of care glucose:  - 180  1 unit SSIC required / 24 hrs    Studies:  No studies past 24 hrs        Invasive Lines/Tubes:  Invasive Devices     Central Venous Catheter Line            CVC Central Lines 08/19/20 Triple 2 days          Peripheral Intravenous Line            Peripheral IV 08/19/20 Right Hand 2 days                Physical Exam:    HEENT/NECK:  PERRLA  No jugular venous distention  Cardiac: Regular rate and rhythm and No murmurs/rubs/gallops  Pulmonary:  Breath sounds clear bilaterally and No rales/rhonchi/wheezes  Abdomen:  Non-tender, Non-distended and Normal bowel sounds  Incisions: Sternum is stable  Incision dressed with Acticoat  No erythema or drainage and Saphenectomy incision dressed with Acticoat  No erythema or drainage  Extremities: Extremities warm/dry, Radial pulses 2+ bilaterally and Trace edema B/L  Neuro: Alert and oriented X 3, Sensation is grossly intact and No focal deficits  Skin: Warm/Dry, without rashes or lesions          Assessment:  Principal Problem:    NSTEMI (non-ST elevated myocardial infarction) ([facility] [address] )  Active Problems:    Leukocytosis    Aortic stenosis    HTN (hypertension)    HLD (hyperlipidemia)    CAD (coronary artery disease)    S/P CABG (coronary artery bypass graft)    S/P AVR (aortic valve replacement) Thrombocytopenia (HCC)    Hypocalcemia    Irritation of right eye    Postoperative atrial fibrillation (HCC)       Aortic stenosis, Non-Rheumatic, Coronary artery disease  S/P aortic valve replacement and coronary artery bypass grafting; POD # 3    Plan:    1  Cardiac:   NSR; HR/BP well controlled  Change Lopressor, 50mg PO q12 hrs  Continue ASA and Statin therapy  EPW removed  remove central IV access today   Continue DVT prophylaxis  PO Atrial fibrillation - resolved  Continue BB  Amio contraindicated due to contrast/iodine allergy  No further Coumadin required  If reverts back to Afib, will resume AC    2  Pulmonary:   Good Room air oxygen saturation; Continue incentive spirometry/Coughing/Deep breathing exercises  CTs removed    3  Renal:   Intake/Output net: -900 mL/24 hours  Continue diuresis   Lasix 40 mg IV QD  Potassium Chloride 20 mEq PO QD  Post op Creatinine stable; Follow up labs prn    4  Neuro:  Neurologically intact; No active issues  Incisional pain well-controlled  Continue Tylenol, 975 mg PO q 8, standing dose  Continue Oxycodone, 2 5 to 5 mg PO q 4 hours prn pain    5  GI:  Tolerating TLC 2 3 gm sodium diet  Maintain 1800 mL daily fluid restriction   Continue stool softeners and prn suppository  Continue GI prophylaxis    6  Endo:   No history of diabetes; Glucose well-controlled with sliding scale coverage    7    Hematology:    Post-operative blood count acceptable; Trend prn and Leukocytosis - afebrile, monitor  8    Disposition:      Doing well  Discharge home today  VTE Pharmacologic Prophylaxis: Fondaparinux (Arixtra)  VTE Mechanical Prophylaxis: sequential compression device    Collaborative rounds completed with SIMONE Granados    Plan of care discussed with bedside nurse    SIGNATURE: Blaine Tompkins PA-C  DATE: August 22, 2020  TIME: 7:40 AM

## 2020-08-22 NOTE — SOCIAL WORK
Notified QUEENIE in [facility] that pt is discharged today  PT recommended RW  BALDEMAR spoke with pt's spouse and pt does not have a RW  Wife uses her RW for MS  BALDEMAR asked physician for RW order and sent referral to [facility] in [facility]   Asked Young[facility] to call pt since he already left the hospital

## 2020-08-22 NOTE — PLAN OF CARE
Problem: PAIN - ADULT  Goal: Verbalizes/displays adequate comfort level or baseline comfort level  Description: Interventions:  - Encourage patient to monitor pain and request assistance  - Assess pain using appropriate pain scale  - Administer analgesics based on type and severity of pain and evaluate response  - Implement non-pharmacological measures as appropriate and evaluate response  - Consider cultural and social influences on pain and pain management  - Notify physician/advanced practitioner if interventions unsuccessful or patient reports new pain  Outcome: Progressing     Problem: INFECTION - ADULT  Goal: Absence or prevention of progression during hospitalization  Description: INTERVENTIONS:  - Assess and monitor for signs and symptoms of infection  - Monitor lab/diagnostic results  - Monitor all insertion sites, i e  indwelling lines, tubes, and drains  - Monitor endotracheal if appropriate and nasal secretions for changes in amount and color  - [place] appropriate cooling/warming therapies per order  - Administer medications as ordered  - Instruct and encourage patient and family to use good hand hygiene technique  - Identify and instruct in appropriate isolation precautions for identified infection/condition  Outcome: Progressing     Problem: SAFETY ADULT  Goal: Patient will remain free of falls  Description: INTERVENTIONS:  - Assess patient frequently for physical needs  -  Identify cognitive and physical deficits and behaviors that affect risk of falls    -  [place] fall precautions as indicated by assessment   - Educate patient/family on patient safety including physical limitations  - Instruct patient to call for assistance with activity based on assessment  - Modify environment to reduce risk of injury  - Consider OT/PT consult to assist with strengthening/mobility  Outcome: Progressing  Goal: Maintain or return to baseline ADL function  Description: INTERVENTIONS:  -  Assess patient's ability to carry out ADLs; assess patient's baseline for ADL function and identify physical deficits which impact ability to perform ADLs (bathing, care of mouth/teeth, toileting, grooming, dressing, etc )  - Assess/evaluate cause of self-care deficits   - Assess range of motion  - Assess patient's mobility; develop plan if impaired  - Assess patient's need for assistive devices and provide as appropriate  - Encourage maximum independence but intervene and supervise when necessary  - Involve family in performance of ADLs  - Assess for home care needs following discharge   - Consider OT consult to assist with ADL evaluation and planning for discharge  - Provide patient education as appropriate  Outcome: Progressing  Goal: Maintain or return mobility status to optimal level  Description: INTERVENTIONS:  - Assess patient's baseline mobility status (ambulation, transfers, stairs, etc )    - Identify cognitive and physical deficits and behaviors that affect mobility  - Identify mobility aids required to assist with transfers and/or ambulation (gait belt, sit-to-stand, lift, walker, cane, etc )  - [place] fall precautions as indicated by assessment  - Record patient progress and toleration of activity level on Mobility SBAR; progress patient to next Phase/Stage  - Instruct patient to call for assistance with activity based on assessment  - Consider rehabilitation consult to assist with strengthening/weightbearing, etc   Outcome: Progressing     Problem: DISCHARGE PLANNING  Goal: Discharge to home or other facility with appropriate resources  Description: INTERVENTIONS:  - Identify barriers to discharge w/patient and caregiver  - Arrange for needed discharge resources and transportation as appropriate  - Identify discharge learning needs (meds, wound care, etc )  - Arrange for interpretive services to assist at discharge as needed  - Refer to Case Management Department for coordinating discharge planning if the patient needs post-hospital services based on physician/advanced practitioner order or complex needs related to functional status, cognitive ability, or social support system  Outcome: Progressing     Problem: Knowledge Deficit  Goal: Patient/family/caregiver demonstrates understanding of disease process, treatment plan, medications, and discharge instructions  Description: Complete learning assessment and assess knowledge base  Interventions:  - Provide teaching at level of understanding  - Provide teaching via preferred learning methods  Outcome: Progressing     Problem: Potential for Falls  Goal: Patient will remain free of falls  Description: INTERVENTIONS:  - Assess patient frequently for physical needs  -  Identify cognitive and physical deficits and behaviors that affect risk of falls    -  [place] fall precautions as indicated by assessment   - Educate patient/family on patient safety including physical limitations  - Instruct patient to call for assistance with activity based on assessment  - Modify environment to reduce risk of injury  - Consider OT/PT consult to assist with strengthening/mobility  Outcome: Progressing     Problem: Prexisting or High Potential for Compromised Skin Integrity  Goal: Skin integrity is maintained or improved  Description: INTERVENTIONS:  - Identify patients at risk for skin breakdown  - Assess and monitor skin integrity  - Assess and monitor nutrition and hydration status  - Monitor labs   - Assess for incontinence   - Turn and reposition patient  - Assist with mobility/ambulation  - Relieve pressure over bony prominences  - Avoid friction and shearing  - Provide appropriate hygiene as needed including keeping skin clean and dry  - Evaluate need for skin moisturizer/barrier cream  - Collaborate with interdisciplinary team   - Patient/family teaching  - Consider wound care consult   Outcome: Progressing

## 2020-08-22 NOTE — INCIDENTAL FINDINGS
The following findings require follow up:  Radiographic finding   Findinmm left thyroid nodule   Follow up required: none indicated       Please notify the following clinician to assist with the follow up:   Dr Julia Mccoy

## 2020-08-22 NOTE — PROGRESS NOTES
Heart Failure/ Pulmonary Hypertension Progress Note - Autumn Huertas 72 y o  male MRN: [id number]    Unit/Bed#: [facility] 419-01 Encounter: [id number]      Assessment:    Principal Problem:    NSTEMI (non-ST elevated myocardial infarction) ([facility] [address] )  Active Problems:    Leukocytosis    Aortic stenosis    HTN (hypertension)    HLD (hyperlipidemia)    CAD (coronary artery disease)    S/P CABG (coronary artery bypass graft)    S/P AVR (aortic valve replacement)    Thrombocytopenia (HCC)    Hypocalcemia    Irritation of right eye    Postoperative atrial fibrillation (HCC)    1  Objective: Intake/ Output: 850/1750 with Lasix 40 mg IV daily  Weight: 179 lbs  Tele:     # s/p CABG x 1 SVG to OM2, AVR 21 mm Morrison Inspiris tissue valve for severe AS    Med Rx: asa 325 mg, atorvastatin 80 mg, metoprolol 25 mg Q6    Echocardiogram 8/16  LVEF: 60%  LVIDd:  RV: normal  MR:  PASP: 44  RVOT:   Other: severe AS, mean gradient of 57 mmHg, KAUR 0 6 cm2      # AFib- post op  amio contraindicated due to iodine allergy    Plan:  Continue with current med rx    Review of Systems   Constitutional: Negative for activity change, appetite change, fatigue and unexpected weight change  HENT: Negative for congestion and nosebleeds  Eyes: Negative  Respiratory: Negative for cough, chest tightness and shortness of breath  Cardiovascular: Negative for chest pain, palpitations and leg swelling  Gastrointestinal: Negative for abdominal distention  Endocrine: Negative  Genitourinary: Negative  Musculoskeletal: Negative  Skin: Negative  Neurological: Negative for dizziness, syncope and weakness  Hematological: Negative  Psychiatric/Behavioral: Negative  [employer] (day, reason): [place] catheter (day, reason):    Vitals: Blood pressure 136/80, pulse 68, temperature 98 °F (36 7 °C), temperature source Oral, resp  rate 18, height 5' 3" (1 6 m), weight 81 4 kg (179 lb 7 3 oz), SpO2 98 %  , Body mass index is 31 79 kg/m² , I/O last 3 completed shifts: In: 950 [P O :950]  Out: 2510 [Urine:2450; Chest Tube:60]  No intake/output data recorded  Wt Readings from Last 3 Encounters:   08/22/20 81 4 kg (179 lb 7 3 oz)   08/15/20 86 2 kg (190 lb)       Intake/Output Summary (Last 24 hours) at 8/22/2020 1153  Last data filed at 8/22/2020 0833  Gross per 24 hour   Intake 670 ml   Output 1450 ml   Net -780 ml     I/O last 3 completed shifts: In: 950 [P O :950]  Out: 2510 [Urine:2450; Chest Tube:60]    No significant arrhythmias seen on telemetry review         Physical Exam:  Vitals:    08/22/20 0324 08/22/20 0600 08/22/20 0700 08/22/20 1100   BP: 116/65  120/61 136/80   BP Location: Right arm   Left arm   Pulse: 67  69 68   Resp: 18  18 18   Temp: 98 4 °F (36 9 °C)  97 8 °F (36 6 °C) 98 °F (36 7 °C)   TempSrc: Oral  Oral Oral   SpO2: 97%  98% 98%   Weight:  81 4 kg (179 lb 7 3 oz)     Height:           GEN: Jennifer Li appears well, alert and oriented x 3, pleasant and cooperative   HEENT: pupils equal, round, and reactive to light; extraocular muscles intact  NECK: supple, no carotid bruits   HEART: regular rhythm, normal S1 and S2, no murmurs, clicks, gallops or rubs, JVP is    LUNGS: clear to auscultation bilaterally; no wheezes, rales, or rhonchi   ABDOMEN: normal bowel sounds, soft, no tenderness, no distention  EXTREMITIES: peripheral pulses normal; no clubbing, cyanosis, or edema  NEURO: no focal findings   SKIN: normal without suspicious lesions on exposed skin      Current Facility-Administered Medications:     acetaminophen (TYLENOL) tablet 650 mg, 650 mg, Oral, Q4H PRN, Faheem Decker PA-C    acetaminophen (TYLENOL) tablet 975 mg, 975 mg, Oral, Q8H, Eileen Bolivar PA-C, 975 mg at 08/22/20 1013    aspirin tablet 325 mg, 325 mg, Oral, Daily, Faheem Decker PA-C, 325 mg at 08/22/20 1013    atorvastatin (LIPITOR) tablet 80 mg, 80 mg, Oral, Daily With Aubree Rod PA-C, 80 mg at 08/21/20 8977   bisacodyl (DULCOLAX) rectal suppository 10 mg, 10 mg, Rectal, Daily PRN, Demian Llanos PA-C    chlorhexidine (PERIDEX) 0 12 % oral rinse 15 mL, 15 mL, Swish & Spit, BID, Demian Llanos PA-C, 15 mL at 08/22/20 1013    docusate sodium (COLACE) capsule 100 mg, 100 mg, Oral, BID, Demian Llanos PA-C, 100 mg at 08/22/20 1012    fondaparinux (ARIXTRA) subcutaneous injection 2 5 mg, 2 5 mg, Subcutaneous, Daily, Demian Llanos PA-C, 2 5 mg at 08/22/20 1013    furosemide (LASIX) injection 40 mg, 40 mg, Intravenous, BID (diuretic), Mackenzie Faustin PA-C, 40 mg at 08/22/20 1013    glycerin-hypromellose- (ARTIFICIAL TEARS) ophthalmic solution 1 drop, 1 drop, Both Eyes, Q6H PRN, Demian Llanos PA-C    insulin lispro (HumaLOG) 100 units/mL subcutaneous injection 1-5 Units, 1-5 Units, Subcutaneous, HS, Demian Llanos PA-C    insulin lispro (HumaLOG) 100 units/mL subcutaneous injection 1-6 Units, 1-6 Units, Subcutaneous, TID AC, 1 Units at 08/21/20 1848 **AND** Fingerstick Glucose (POCT), , , TID AC, Demian Llanos PA-C    lidocaine (cardiac) injection 100 mg, 100 mg, Intravenous, Q30 Min PRN, Demian Llanos PA-C    metoprolol tartrate (LOPRESSOR) tablet 50 mg, 50 mg, Oral, Q12H [address], Blessing Carson PA-C    mupirocin (BACTROBAN) 2 % nasal ointment 1 application, 1 application, Nasal, I99S [address], Demian Llanos PA-C, 1 application at 08/66/61 1012    ondansetron (ZOFRAN) injection 4 mg, 4 mg, Intravenous, Q6H PRN, Demian Llanos PA-C    oxyCODONE (ROXICODONE) IR tablet 2 5 mg, 2 5 mg, Oral, Q4H PRN, Demian Llanos PA-C    oxyCODONE (ROXICODONE) IR tablet 5 mg, 5 mg, Oral, Q4H PRN, Demian Llanos PA-C    pantoprazole (PROTONIX) EC tablet 40 mg, 40 mg, Oral, Daily, Demian Llanos PA-C, 40 mg at 08/22/20 1013    polyethylene glycol (MIRALAX) packet 17 g, 17 g, Oral, Daily, Eileen Bolivar PA-C, 17 g at 08/22/20 1021    potassium chloride (K-DUR,KLOR-CON) CR tablet 20 mEq, 20 mEq, Oral, BID, Colby Solis PA-C, 20 mEq at 08/22/20 1013    temazepam (RESTORIL) capsule 15 mg, 15 mg, Oral, HS PRN, Toña Camacho PA-C      Labs & Results:    Results from last 7 days   Lab Units 08/16/20  1830 08/16/20  1543 08/16/20  1035   TROPONIN I ng/mL 13 16* 9 78* 12 79*     Results from last 7 days   Lab Units 08/21/20  0443 08/20/20  0424 08/20/20  0031 08/19/20  1708  08/19/20  0620   WBC Thousand/uL 14 39* 13 53*  --   --   --  11 30*   HEMOGLOBIN g/dL 12 8 12 9 13 1 13 0  --  15 2   I STAT HEMOGLOBIN   --   --   --   --    < >  --    HEMATOCRIT % 39 1 39 2 40 0 39 1  --  48 0   HEMATOCRIT, ISTAT   --   --   --   --    < >  --    PLATELETS Thousands/uL 152 123*  --  112*   < > 200    < > = values in this interval not displayed  Results from last 7 days   Lab Units 08/22/20  0543 08/21/20  0443 08/20/20  2047  08/19/20  1707 08/19/20  1559 08/19/20  1539  08/15/20  1453   POTASSIUM mmol/L 4 2 4 2 3 7   < >  --   --   --    < > 4 6   CHLORIDE mmol/L 104 104 105   < >  --   --   --    < > 103   CO2 mmol/L 27 29 28   < >  --   --   --    < > 29   CO2, I-STAT mmol/L  --   --   --   --  23 26 25   < >  --    BUN mg/dL 16 13 15   < >  --   --   --    < > 10   CREATININE mg/dL 0 82 0 78 0 87   < >  --   --   --    < > 1 07   CALCIUM mg/dL 8 1* 7 9* 7 4*   < >  --   --   --    < > 10 1   ALK PHOS U/L  --   --   --   --   --   --   --   --  79   ALT U/L  --   --   --   --   --   --   --   --  29   AST U/L  --   --   --   --   --   --   --   --  20   GLUCOSE, ISTAT mg/dl  --   --   --   --  114 122 130   < >  --     < > = values in this interval not displayed  Results from last 7 days   Lab Units 08/22/20  0543 08/21/20  1035 08/17/20  2123   INR  1 09 1 09 0 99         Counseling / Coordination of Care  Total floor / unit time spent today 25 minutes  Greater than 50% of total time was spent with the patient and / or family counseling and / or coordination of care    A description of the counseling / coordination of care: 15      Thank you for the opportunity to participate in the care of this patient  Jose L BONE    Director Heart Failure/ Medical Director [address] [place] [address]

## 2020-08-24 NOTE — UTILIZATION REVIEW
Notification of Discharge  This is a Notification of Discharge from our facility [address]  Please be advised that this patient has been discharge from our facility  Below you will find the admission and discharge date and time including the patients disposition  PRESENTATION DATE: 8/17/2020  8:18 PM    IP ADMISSION DATE: 8/17/20 2018   DISCHARGE DATE: 8/22/2020  3:14 PM  DISPOSITION: Home with Dmitriy Moore with [facility] [employer]   Admission Orders listed below:  Admission Orders (From admission, onward)     Ordered        08/17/20 2028  Inpatient Admission  Once                   Please contact the UR Department if additional information is required to close this patient's authorization/case  [facility] Utilization Review Department  Main: [phone] x carefully listen to the prompts  All voicemails are confidential   [email or web address]  Send all requests for admission clinical reviews, approved or denied determinations and any other requests to dedicated fax number below belonging to the campus where the patient is receiving treatment   List of dedicated fax numbers:  [address] (Administrative/Medical Necessity) [phone]   [address] [place] [address] (Maternity/NICU/Pediatrics) [phone]   ST  [address] [facility] [phone]   [place] [phone]   [facility] [phone]   Cheryl Ortiz [phone]   [address] [place] [address] [phone]   [facility]  [address] [facility] [phone]   [address] [place] [address] [phone]     Attestation signed by Pritesh Child MD at 8/24/2020 8:02 AM               Show:Clear all  [x]Manual[x]Template[x]Copied    Added by:  [x]Blessing Pillai    []Hover for details  Discharge Summary - Cardiothoracic Surgery   Cristian Farah 72 y o  male MRN: [id number]  Unit/Bed#: [facility] 419-01 Encounter: [id number]     Admission Date: 8/17/2020      Discharge Date: 08/22/20     Admitting Diagnosis: Chest pain [R07 9]     Primary Discharge Diagnosis:   Aortic regurgitation, Aortic stenosis, Non-Rheumatic, Coronary artery disease  S/P aortic valve replacement and coronary artery bypass grafting;     Secondary Discharge Diagnosis:   severe AS, HTN, HLD        Attending: SIMONE Connell      Consulting Physician(s):   Cardiology  Medical/Critical Care  OMFS     Procedures Performed:   Procedure(s):  CORONARY ARTERY BYPASS GRAFT (CABG) x 1; Left EVH/SVG to OM2; AVR with Morrison 21 mm Inspiris Tissue Valve  TRANSESOPHAGEAL ECHOCARDIOGRAM (PHOEBE)      Hospital Course:   8/17: 71 y/o CM presents to [facility] ED 8/15 w/ c/o substernal CP x1 day w/ radiation to b/l arms  (+) troponin, (+) EKG changes  Dx NSTEMI & admitted for w/u  TTE w/ preserved EF & severe AS/moderate AI  Cardiac cath w/ MV CAD  Awaiting transfer to [facility] for cardiac sx consultation  8/18: Seen in consultation  Preop orders placed  Panorex ordered  Periapical lucency noted  OMFS consulted  Recommend outpatient follow-up, as there is no need for intervention currently  Preop orders placed for OR tomorrow   8/19: CABG x1, AVR (#21mm Morrison Inspiris)  Transferred to ICU supported with Cardene  No significant postoperative bleeding  Wean towards extubation  8/20: Extubated to 4LNC, wean as tolerated  Amio held for Iodine allergy, continue to hold  Delined  Given 1L LR & 250cc albumin  Tmax 101 3 QHS, resolved s/p Tylenol, afebrile this AM  Started on cardene for HTN, now at 5, start Lopressor 25mg BID, wean cardene to off, discontinue a line  Start Lasxi 40mg IV QDay, discontinue maravilla catheter  Discontinue KACIE drain  C/o right eye irritation, cornea clear of debris, saline flush/wash & add eye gtts   Discontinue insulin gtt, transition to [facility]  Transfer to stepdown  8/21: Developed Afib with RVR last evening  Increase Metoprolol 25mg q6 and give IV Metoprolol 2 5mg IV x 1  Start Coumadin 5mg tonight  Increase Lasix and KCl to BID  D/C CT/EPW    8/22: Converted to NSR at approx 1600 yesterday and remains NSR  RA, HR 65  Change Metoprolol to 50mg q12  Deemed stable for discharge home today          Condition at Discharge:   good      Discharge Physical Exam:     HEENT/NECK:  PERRLA  No jugular venous distention  Cardiac: Regular rate and rhythm and No murmurs/rubs/gallops  Pulmonary:  Breath sounds clear bilaterally and No rales/rhonchi/wheezes  Abdomen:  Non-tender, Non-distended and Normal bowel sounds  Incisions: Sternum is stable  Incision is clean, dry, and intact  and Saphenectomy incison is clean, dry, and intact  Extremities: Extremities warm/dry and Trace edema B/L  Neuro: Alert and oriented X 3, Sensation is grossly intact and No focal deficits  Skin: Warm/Dry, without rashes or lesions      Discharge Data:            Results from last 7 days   Lab Units 08/21/20  0443 08/20/20  0424 08/20/20  0031 08/19/20  1708   08/19/20  0620   WBC Thousand/uL 14 39* 13 53*  --   --   --  11 30*   HEMOGLOBIN g/dL 12 8 12 9 13 1 13 0  --  15 2   I STAT HEMOGLOBIN    --   --   --   --    < >  --    HEMATOCRIT % 39 1 39 2 40 0 39 1  --  48 0   HEMATOCRIT, ISTAT    --   --   --   --    < >  --    PLATELETS Thousands/uL 152 123*  --  112*   < > 200    < > = values in this interval not displayed                Results from last 7 days   Lab Units 08/22/20  0543 08/21/20  0443 08/20/20  2047   08/19/20  1707   POTASSIUM mmol/L 4 2 4 2 3 7   < >  --    CHLORIDE mmol/L 104 104 105   < >  --    CO2 mmol/L 27 29 28   < >  --    CO2, I-STAT mmol/L  --   --   --   --  23   BUN mg/dL 16 13 15   < >  --    CREATININE mg/dL 0 82 0 78 0 87   < >  --    GLUCOSE, ISTAT mg/dl  --   --   --   --  114   CALCIUM mg/dL 8 1* 7 9* 7 4*   < >  --     < > = values in this interval not displayed  Results from last 7 days   Lab Units 08/22/20  0543 08/21/20  1035 08/19/20  0706 08/18/20 2024 08/18/20  1137   08/17/20  2123   INR   1 09 1 09  --   --   --   --  0 99   PTT seconds  --   --  56* 53* 91*   < > 24    < > = values in this interval not displayed         Discharge instructions/Information to patient and family:   See after visit summary for information provided to patient and family        Linda Sneed was educated on restrictions regarding driving and lifting, and techniques of proper incisional care  They were specifically counselled on signs and symptoms of an incisional infection, and advised to contact our service immediately should they develop fevers, sweats, chill, redness or drainage at the site of any incisions      Provisions for Follow-Up Care:  See after visit summary for information related to follow-up care and any pertinent home health orders        Disposition:  Home     Planned Readmission:   No     Discharge Medications:  See after visit summary for reconciled discharge medications provided to patient and family        Linda Sneed was provided contact information and scheduled a follow up appointment with SIMONE Tatum  Additionally, follow up appointments have been scheduled for their primary care physician and primary cardiologist   Contact information was provided      Upon admission, troponins were Positive for NSTEMI, with level > 0 02       Linda Sneed was counseled on the importance of avoiding tobacco products  As with all patients whom have undergone open heart surgery, tobacco cessation medication was contraindicated at the time of discharge       ACE/ARB was Contraindicated secondary to EF > 40% and no history of heart failure   Amiodarone was contraindicated secondary to iodine allergy        The patient was discharged on ongoing diuretic therapy with Torsemide 20 mg, PO QD and Potassium Chloride 20 mEq, PO QD  They were advised to continue these medications for 7 days, unless otherwise directed       Narcotic pain medication was prescribed in the form of Oxycodone  Prior to prescribing, their prescription profile was reviewed on the [place] department of health prescription drug monitoring program      The patient was informed that following their postoperative surgical evaluation, they will be referred to outpatient cardiac rehabilitation  They were counseled that this program is run by specialists who will help them safely strengthen their heart and prevent more heart disease  Cardiac rehabilitation will include exercise, relaxation, stress management, and heart-healthy nutrition  Caregivers will also check to make sure their medication regimen is working      I spent 30 minutes discharging the patient  This time was spent on the day of discharge  I had direct contact with the patient on the day of discharge   Additional documentation is required if more than 30 minutes were spent on discharge       SIGNATURE: Blaine Tompkins PA-C  DATE: August 22, 2020  TIME: 1:10 PM                  Cosigned by:  Lynn Levi MD at 8/24/2020  8:02 AM

## 2020-08-28 ENCOUNTER — TELEPHONE (OUTPATIENT)
Dept: CARDIAC SURGERY | Facility: CLINIC | Age: 65
End: 2020-08-28

## 2020-08-28 NOTE — TELEPHONE ENCOUNTER
Called patient to review progress s/p discharge      States doing well overall  Walking minimum 4 times per day, using IS as often as he can, watching sternal/exertional precautions  Taking prescriptions as prescribed  Weight has been the same to down from discharge, weighting self every other day -- instructed needs to do so every day, no SOB/HERRMANN/LE edema, d/w patient no need to renew diuretic  Saw PCP since discharge, no issues per patient by PCP  Incision dry w/o erythema or drainage  Showering daily  Pain controlled  Diet good, abiding by fluid restrictions      No questions from the patient for me to address  Cardiology appt on 9/8, surgeon visit 9/24  Told to call office w/ further questions or concerns

## 2020-09-08 ENCOUNTER — OFFICE VISIT (OUTPATIENT)
Dept: CARDIOLOGY CLINIC | Facility: CLINIC | Age: 65
End: 2020-09-08
Payer: COMMERCIAL

## 2020-09-08 VITALS
WEIGHT: 181 LBS | SYSTOLIC BLOOD PRESSURE: 122 MMHG | DIASTOLIC BLOOD PRESSURE: 68 MMHG | HEIGHT: 63 IN | BODY MASS INDEX: 32.07 KG/M2 | HEART RATE: 59 BPM | RESPIRATION RATE: 18 BRPM | OXYGEN SATURATION: 95 %

## 2020-09-08 DIAGNOSIS — Z95.1 S/P CABG (CORONARY ARTERY BYPASS GRAFT): ICD-10-CM

## 2020-09-08 DIAGNOSIS — I25.10 ATHEROSCLEROSIS OF NATIVE CORONARY ARTERY OF NATIVE HEART WITHOUT ANGINA PECTORIS: Primary | ICD-10-CM

## 2020-09-08 DIAGNOSIS — I97.89 POSTOPERATIVE ATRIAL FIBRILLATION (HCC): ICD-10-CM

## 2020-09-08 DIAGNOSIS — I10 ESSENTIAL HYPERTENSION: ICD-10-CM

## 2020-09-08 DIAGNOSIS — I48.91 POSTOPERATIVE ATRIAL FIBRILLATION (HCC): ICD-10-CM

## 2020-09-08 DIAGNOSIS — Z95.2 S/P AVR (AORTIC VALVE REPLACEMENT): ICD-10-CM

## 2020-09-08 DIAGNOSIS — I21.4 NSTEMI (NON-ST ELEVATED MYOCARDIAL INFARCTION) (HCC): ICD-10-CM

## 2020-09-08 DIAGNOSIS — E78.2 MIXED HYPERLIPIDEMIA: ICD-10-CM

## 2020-09-08 DIAGNOSIS — I35.0 NONRHEUMATIC AORTIC VALVE STENOSIS: ICD-10-CM

## 2020-09-08 DIAGNOSIS — I25.10 CORONARY ARTERY DISEASE INVOLVING NATIVE CORONARY ARTERY OF NATIVE HEART WITHOUT ANGINA PECTORIS: ICD-10-CM

## 2020-09-08 PROCEDURE — 93000 ELECTROCARDIOGRAM COMPLETE: CPT | Performed by: INTERNAL MEDICINE

## 2020-09-08 PROCEDURE — 99214 OFFICE O/P EST MOD 30 MIN: CPT | Performed by: INTERNAL MEDICINE

## 2020-09-08 RX ORDER — CLOPIDOGREL BISULFATE 75 MG/1
75 TABLET ORAL DAILY
Qty: 90 TABLET | Refills: 3 | Status: SHIPPED | OUTPATIENT
Start: 2020-09-08 | End: 2020-09-30

## 2020-09-08 NOTE — PROGRESS NOTES
[facility]  [address] [place] [id number]  Cardiology Follow Up    Autumn Huertas  1955  [id number]      1  Atherosclerosis of native coronary artery of native heart without angina pectoris  POCT ECG   2  NSTEMI (non-ST elevated myocardial infarction) [facility])  Cardiac event monitor    Lipid Panel with Direct LDL reflex    Comprehensive metabolic panel    Echo limited with contrast if indicated    clopidogrel (PLAVIX) 75 mg tablet    CANCELED: Echo limited with contrast if indicated   3  S/P CABG (coronary artery bypass graft)  clopidogrel (PLAVIX) 75 mg tablet   4  S/P AVR (aortic valve replacement)     5  Nonrheumatic aortic valve stenosis  Cardiac event monitor    Lipid Panel with Direct LDL reflex    Comprehensive metabolic panel    Echo limited with contrast if indicated    CANCELED: Echo limited with contrast if indicated   6  Postoperative atrial fibrillation (HCC)  Cardiac event monitor    Lipid Panel with Direct LDL reflex    Comprehensive metabolic panel    Echo limited with contrast if indicated    CANCELED: Echo limited with contrast if indicated   7  Essential hypertension     8  Mixed hyperlipidemia         Chief Complaint   Patient presents with    Genetic Evaluation     surgery-valve and vein       Interval History:   31-year-old male with hypertension, hyperlipidemia presented for follow-up after recent hospital discharge  Patient was recently admitted in mid August 2020 at [facility] with complaints of chest pain  He ruled in for non-STEMI  Echocardiogram showed preserved LVEF with moderate to severe LVH and severe aortic stenosis  Cardiac catheterization showed 100% occluded mid circ with right to left collateral, proximal LAD 50% stenosis status post negative IFR 0 96, focal mid LAD stenosis with positive IFR of 0 88 with brisk flow distally, luminal irregularities in RCA    He underwent single vessel CABG SVG to OM 2 and aortic wall replacement with 21 mm Morrison resilia pericardial tissue valve on 08/19/2020    Patient is doing well since hospital discharge  Patient denies chest pain, shortness of breath, palpitation, dizziness, leg edema, orthopnea or loss of consciousness  No chest pain or shortness of breath on ambulation  Patient was noted to have AFib post surgery and spontaneously converted to sinus rhythm in less than 48 hours during hospital course  He was not started on amiodarone due to contrast allergy  Was not given oral anticoagulation as he converted to sinus rhythm  Recent labs including vitals reviewed with the patient    Current home medications reviewed with the patient  Patient is taking metoprolol tartrate 50 mg daily including 325 mg aspirin and 80 mg of Lipitor    He was given torsemide and potassium for a week post surgery and has completed the treatment    Patient has follow-up appointment with CT surgery on 09/24/2020  He also has cardiac rehab appointment on 09/23/2020    Review of Systems:  Review of system negative except as mentioned above    Patient Active Problem List   Diagnosis    NSTEMI (non-ST elevated myocardial infarction) ([facility] [address] )    Accelerated hypertension    Elevated brain natriuretic peptide (BNP) level    Allergic reaction to contrast dye    Chest pain    Leukocytosis    Aortic stenosis    HTN (hypertension)    HLD (hyperlipidemia)    CAD (coronary artery disease)    S/P CABG (coronary artery bypass graft)    S/P AVR (aortic valve replacement)    Thrombocytopenia (HCC)    Hypocalcemia    Irritation of right eye    Postoperative atrial fibrillation (HCC)     Past Medical History:   Diagnosis Date    HLD (hyperlipidemia)     HTN (hypertension)      Social History     Socioeconomic History    Marital status: /Civil Union     Spouse name: Not on file    Number of children: Not on file    Years of education: Not on file    Highest education level: Not on file Occupational History    Occupation: occupational therapist     Employer: [address] [address] [address]   Social Needs    Financial resource strain: Not on file    Food insecurity     Worry: Not on file     Inability: Not on file    Transportation needs     Medical: Not on file     Non-medical: Not on file   Tobacco Use    Smoking status: Never Smoker    Smokeless tobacco: Current User     Types: Snuff   Substance and Sexual Activity    Alcohol use: Not Currently    Drug use: Not Currently    Sexual activity: Not Currently   Lifestyle    Physical activity     Days per week: Not on file     Minutes per session: Not on file    Stress: Not on file   Relationships    Social connections     Talks on phone: Not on file     Gets together: Not on file     Attends Jew service: Not on file     Active member of club or organization: Not on file     Attends meetings of clubs or organizations: Not on file     Relationship status: Not on file    Intimate partner violence     Fear of current or ex partner: Not on file     Emotionally abused: Not on file     Physically abused: Not on file     Forced sexual activity: Not on file   Other Topics Concern    Not on file   Social History Narrative    Not on file      History reviewed  No pertinent family history    Past Surgical History:   Procedure Laterality Date    CORONARY ARTERY BYPASS GRAFT WITH VALVE REPLACEMENT Left 8/19/2020    Procedure: CORONARY ARTERY BYPASS GRAFT (CABG) x 1; Left EVH/SVG to OM2; AVR with Morrison 21 mm Inspiris Tissue Valve;  Surgeon: Debria Hatchet, MD;  [facility];  Service: Cardiac Surgery    KNEE ARTHROSCOPY Left     ROTATOR CUFF REPAIR Left        Current Outpatient Medications:     atorvastatin (LIPITOR) 80 mg tablet, Take 1 tablet (80 mg total) by mouth daily with dinner, Disp: 30 tablet, Rfl: 2    docusate sodium (COLACE) 100 mg capsule, Take 1 capsule (100 mg total) by mouth 2 (two) times a day, Disp: 60 capsule, Rfl: 0    metoprolol tartrate (LOPRESSOR) 50 mg tablet, Take 1 tablet (50 mg total) by mouth every 12 (twelve) hours, Disp: 60 tablet, Rfl: 2    omeprazole (PriLOSEC) 20 mg delayed release capsule, Take 1 capsule (20 mg total) by mouth daily, Disp: 30 capsule, Rfl: 0    acetaminophen (TYLENOL) 325 mg tablet, Take 2 tablets (650 mg total) by mouth every 6 (six) hours as needed for mild pain or headaches (Patient not taking: Reported on 9/8/2020), Disp: 30 tablet, Rfl: 0    clopidogrel (PLAVIX) 75 mg tablet, Take 1 tablet (75 mg total) by mouth daily, Disp: 90 tablet, Rfl: 3    oxyCODONE (ROXICODONE) 5 mg immediate release tablet, Take 1/2 tablet (2 5mg) every 4 hours as needed for moderate pain or take 1 tablet (5mg) every 6 hours as needed for severe pain  (Patient not taking: Reported on 9/8/2020), Disp: 30 tablet, Rfl: 0    polyethylene glycol (MIRALAX) 17 g packet, Take 17 g by mouth daily as needed (for constipation) (Patient not taking: Reported on 9/8/2020), Disp: 14 each, Rfl: 0    potassium chloride (K-DUR,KLOR-CON) 20 mEq tablet, Take 1 tablet (20 mEq total) by mouth daily for 7 days, Disp: 7 tablet, Rfl: 1    torsemide (DEMADEX) 20 mg tablet, Take 1 tablet (20 mg total) by mouth daily for 7 days, Disp: 7 tablet, Rfl: 1  Allergies   Allergen Reactions    Contrast Dye [Iodinated Diagnostic Agents] Anaphylaxis       Labs:  No results displayed because visit has over 200 results        Admission on 08/15/2020, Discharged on 08/17/2020   Component Date Value    WBC 08/15/2020 10 90*    RBC 08/15/2020 5 62     Hemoglobin 08/15/2020 15 4     Hematocrit 08/15/2020 48 0     MCV 08/15/2020 85     MCH 08/15/2020 27 4     MCHC 08/15/2020 32 1     RDW 08/15/2020 13 2     MPV 08/15/2020 12 0     Platelets 19/49/8662 234     nRBC 08/15/2020 0     Neutrophils Relative 08/15/2020 84*    Immat GRANS % 08/15/2020 1     Lymphocytes Relative 08/15/2020 9*    Monocytes Relative 08/15/2020 4     Eosinophils Relative 08/15/2020 1     Basophils Relative 08/15/2020 1     Neutrophils Absolute 08/15/2020 9 28*    Immature Grans Absolute 08/15/2020 0 07     Lymphocytes Absolute 08/15/2020 0 96     Monocytes Absolute 08/15/2020 0 46     Eosinophils Absolute 08/15/2020 0 08     Basophils Absolute 08/15/2020 0 05     Troponin I 08/15/2020 0 22*    NT-proBNP 08/15/2020 804*    Sodium 08/15/2020 142     Potassium 08/15/2020 4 6     Chloride 08/15/2020 103     CO2 08/15/2020 29     ANION GAP 08/15/2020 10     BUN 08/15/2020 10     Creatinine 08/15/2020 1 07     Glucose 08/15/2020 113     Calcium 08/15/2020 10 1     AST 08/15/2020 20     ALT 08/15/2020 29     Alkaline Phosphatase 08/15/2020 79     Total Protein 08/15/2020 8 2     Albumin 08/15/2020 4 3     Total Bilirubin 08/15/2020 0 50     eGFR 08/15/2020 72     PTT 08/16/2020 80*    Protime 08/16/2020 13 8     INR 08/16/2020 1 11     Troponin I 08/15/2020 5 96*    Troponin I 08/16/2020 9 67*    Troponin I 08/16/2020 11 49*    PTT 08/16/2020 65*    Troponin I 08/16/2020 12 79*    Cholesterol 08/17/2020 198     Triglycerides 08/17/2020 150     HDL, Direct 08/17/2020 36*    LDL Calculated 08/17/2020 132*    Troponin I 08/16/2020 9 78*    Ventricular Rate 08/15/2020 67     Atrial Rate 08/15/2020 67     OK Interval 08/15/2020 176     QRSD Interval 08/15/2020 98     QT Interval 08/15/2020 400     QTC Interval 08/15/2020 422     P Axis 08/15/2020 51     QRS Axis 08/15/2020 90     T Wave Axis 08/15/2020 110     Ventricular Rate 08/15/2020 65     Atrial Rate 08/15/2020 65     OK Interval 08/15/2020 174     QRSD Interval 08/15/2020 94     QT Interval 08/15/2020 408     QTC Interval 08/15/2020 424     P Axis 08/15/2020 46     QRS Axis 08/15/2020 52     T Wave Axis 08/15/2020 96     Ventricular Rate 08/15/2020 68     Atrial Rate 08/15/2020 68     OK Interval 08/15/2020 174     QRSD Interval 08/15/2020 96     QT Interval 08/15/2020 426     QTC Interval 08/15/2020 452     P Axis 08/15/2020 54     QRS Axis 08/15/2020 83     T Wave Axis 08/15/2020 103     Ventricular Rate 08/15/2020 64     Atrial Rate 08/15/2020 64     NV Interval 08/15/2020 182     QRSD Interval 08/15/2020 100     QT Interval 08/15/2020 420     QTC Interval 08/15/2020 433     P Axis 08/15/2020 44     QRS Axis 08/15/2020 35     T Wave Axis 08/15/2020 130     Ventricular Rate 08/16/2020 46     Atrial Rate 08/16/2020 46     NV Interval 08/16/2020 180     QRSD Interval 08/16/2020 96     QT Interval 08/16/2020 470     QTC Interval 08/16/2020 411     P Axis 08/16/2020 57     QRS Axis 08/16/2020 67     T Wave Axis 08/16/2020 134     Troponin I 08/16/2020 13 16*    Sodium 08/17/2020 141     Potassium 08/17/2020 4 2     Chloride 08/17/2020 106     CO2 08/17/2020 26     ANION GAP 08/17/2020 9     BUN 08/17/2020 22     Creatinine 08/17/2020 1 12     Glucose 08/17/2020 126     Calcium 08/17/2020 9 0     eGFR 08/17/2020 69     WBC 08/17/2020 19 16*    RBC 08/17/2020 5 14     Hemoglobin 08/17/2020 14 4     Hematocrit 08/17/2020 43 9     MCV 08/17/2020 85     MCH 08/17/2020 28 0     MCHC 08/17/2020 32 8     RDW 08/17/2020 13 4     Platelets 88/31/4544 230     MPV 08/17/2020 12 0     PTT 08/17/2020 56*    Activated Clotting Time,* 08/17/2020 132     Specimen Type 08/17/2020 ARTERIAL     Activated Clotting Time,* 08/17/2020 201*    Specimen Type 08/17/2020 ARTERIAL      Imaging: Xr Mandible Panorex ([place] Only)    Result Date: 8/18/2020  Narrative: MANDIBLE - PANOREX INDICATION:   preop AVR  no routine dental care  COMPARISON:  None VIEWS:  XR MANDIBLE PANOREX ([place] ONLY) FINDINGS: No acute fracture  Multiple absent and carious teeth  Limited but lucencies suggestive of the apex of the right maxillary 1st bicuspid  The temporomandibular joints appear grossly intact  Impression: No fracture   Multiple carious teeth   Possible right maxillary 1st bicuspid periapical lucency  Workstation performed: [id number]     Xr Chest Portable    Result Date: 8/20/2020  Narrative: CHEST INDICATION:   AVR and CABG on 8/19/2020  [place] [facility] COMPARISON:  Chest radiograph from 8/19/2020  Chest CT from 8/15/2020  EXAM PERFORMED/VIEWS:  XR CHEST PORTABLE FINDINGS:  ET tube, NG tube, and pulmonary artery catheter removed  Right shoulder catheter in SVC  Normal heart size  CABG   AVR  Sternal wires well aligned  2 mediastinal drains  The lungs are clear  No pneumothorax or pleural effusion  Osseous structures appear within normal limits for patient age  Impression: Expected appearance after cardiac surgery  No effusion or pneumothorax  Workstation performed: [id number]     X-ray Chest 1 View Portable    Result Date: 8/15/2020  Narrative: CHEST INDICATION:   chest pain  COMPARISON:  12/7/2011 EXAM PERFORMED/VIEWS:  XR CHEST PORTABLE  AP semierect Images: 1 FINDINGS: The heart is top normal in size  The mediastinum and hilar structures are normal  Lungs are hyperinflated but clear  No pleural effusions or pneumothorax  Osseous structures appear within normal limits for patient age  Impression: No acute cardiopulmonary disease  Workstation performed: [id number]     Cta Chest Abdomen Pelvis W Wo Contrast (dissection)    Result Date: 8/15/2020  Narrative: CT ANGIOGRAM OF THE CHEST, ABDOMEN AND PELVIS WITH AND WITHOUT IV CONTRAST INDICATION:  Shortness of breath, chest /back /abdomen pain COMPARISON: Chest radiograph 8/15/2020 TECHNIQUE:  CT angiogram examination of the chest, abdomen and pelvis was performed according to standard protocol  This examination, like all CT scans performed in the [facility], was performed utilizing techniques to minimize radiation dose exposure, including the use of iterative reconstruction and automated exposure control  Contrast as well as noncontrast images were obtained    3D reconstructions were performed an independent workstation, and are supplied for review  Rad dose 1825 mGy-cm IV Contrast:  100 mL of iohexol (OMNIPAQUE) Enteric Contrast:  Not given FINDINGS: VASCULAR STRUCTURES:  There is adequate opacification of the pulmonary arterial vasculature with no filling defects identified to suggest acute pulmonary emboli  Pulmonary artery is of normal caliber  RV/LV ratio within normal limits  Adequate opacification of the thoracic and abdominal aorta and branch vessels noted  Vessels are of normal caliber  Classical arch great vessel anatomy is identified with widely patent vessel origins  Atherosclerotic calcification of the aortic valve noted diffusely  Patent single renal arteries noted bilaterally  Patent celiac, superior mesenteric and inferior mesenteric arteries noted  Mild atherosclerotic calcified and noncalcified plaque of the abdominal aorta and branch vessels noted  Mild ectasia  of the infrarenal abdominal aorta is present  No evidence for aortic dissection  OTHER FINDINGS: CHEST: HEART: Top normal size heart  Aortic valve calcification and mild coronary artery calcification present    LUNGS:  Unremarkable  PLEURA: No pleural effusion  MEDIASTINUM AND DELICIA:  No mass or significant lymphadenopathy  CHEST WALL AND LOWER NECK:  7 mm nodule in the mid left thyroid gland  Incidental discovery of one or more thyroid nodule(s) measuring less than 1 5 cm and without suspicious features is noted in this patient who is above 28years old; according to guidelines published in the February 2015 white paper on incidental thyroid nodules in the Journal of the [employer] of Radiology [facility]), no further evaluation is recommended    ABDOMEN LIVER/BILIARY TREE:  Unremarkable  GALLBLADDER:  No calcified gallstones  No pericholecystic inflammatory change  SPLEEN:  Unremarkable  Normal size  PANCREAS:  Unremarkable  ADRENAL GLANDS:  Unremarkable   KIDNEYS/URETERS:  No solid renal mass  No hydronephrosis  No urinary tract calculi  PELVIS REPRODUCTIVE ORGANS:  Unremarkable for patient's age  URINARY BLADDER:  Decompressed  ADDITIONAL ABDOMINAL AND PELVIC STRUCTURES: STOMACH AND BOWEL:  Moderate fecal material in the colon and rectum consistent with constipation  No bowel wall thickening  No intestinal obstruction  A few scattered diverticula are incidentally noted  Appendix is unremarkable  ABDOMINOPELVIC CAVITY:  No pathologically enlarged mesenteric or retroperitoneal lymph nodes  No ascites or free intraperitoneal air  ABDOMINAL WALL/INGUINAL REGIONS:  Unremarkable  OSSEOUS STRUCTURES:  No acute fracture or destructive osseous lesion  Impression: 1  No evidence for acute pulmonary embolism or other acute intrathoracic abnormality  2   Mild atherosclerotic disease of the thoracic and abdominal aorta with no evidence for aneurysmal dilatation, vascular stenosis or occlusion, or dissection  3   7 mm left thyroid nodule which does not necessitate additional workup  4   Extensive aortic valve calcification should be clinically correlated  5   Additional findings as noted  Workstation performed: [id number]     V[facility]ure Incite Carotid Complete Study    Result Date: 8/18/2020  Narrative:  [facility] REPORT CLINICAL: Indications: Patient presents for a general health evaluation secondary to future open heart surgery  PT is S/P MI  Evaluation to rule out B/L ICA stenosis  Operative History: Cardiac catheterization Risk Factors The patient has history of HTN, HLD, aortic stenosis, and CAD  The patient's current BMI is 32 24, Weight is 182 lb and height is 63 in  PT is a non-smoker  Clinical Right:  IV site  Left Pressure:  140/66 mm Hg  FINDINGS:  Right                Impression  PSV  EDV (cm/s)  Direction of Flow  Ratio  Dist  ICA                         64          14                      1 10  Mid  ICA                          52          14                      0 89  Prox   ICA 44          10                      0 76  Carotid Bifurcation  1 - 49%                                                Dist CCA                          44           7                            Mid CCA                           58           8                      0 94  Prox CCA                          62           9                            Ext Carotid                       63           5                      1 09  Prox Vert                         37           9  Antegrade                 Subclavian                        64           0                             Left                 Impression  PSV  EDV (cm/s)  Direction of Flow  Ratio  Dist  ICA                         94          22                      1 28  Mid  ICA                          49          12                      0 67  Prox  ICA            1 - 49%      34          10                      0 47  Dist CCA                          48          10                            Mid CCA                           74          10                      1 14  Prox CCA                          65          11                            Ext Carotid                       47           0                      0 65  Prox Vert                         44           9  Antegrade                 Subclavian                        55           0                               CONCLUSION:  Impression  RIGHT: There is <50% stenosis noted in the internal carotid artery  Plaque is heterogenous and smooth  Vertebral artery flow is antegrade  There is no significant subclavian artery disease  LEFT: There is <50% stenosis noted in the internal carotid artery  Plaque is heterogenous and smooth  Vertebral artery flow is antegrade  There is no significant subclavian artery disease  TECH NOTE:  Minimal plaque was identified in the B/L ICA  Technical findings faxed to chart    Internal carotid artery stenosis determination by consensus criteria from: Langston Dakins , et al  Carotid Artery Stenosis: Gray-Scale and Doppler US Diagnosis - Society of Radiologists in [address] [place] [address] [address], Radiology 2003; [phone]  SIGNATURE: Electronically Signed by: Waylon Paz on 2020-08-18 02:41:25 PM    Vas Lower Limb Vein Mapping Bypass Graft    Result Date: 8/18/2020  Narrative:  [facility] REPORT CLINICAL: Indications: Patient presents for a general health evaluation secondary to future open heart surgery  PT is S/P MI  Evaluation for conduit  Operative History: Cardiac catheterization Risk Factors The patient has history of HTN, HLD, aortic stenosis, and CAD  The patient's current BMI is 32 24, Weight is 182 lb and height is 63 in  PT is a non-smoker  Clinical Right:  IV site  Left Pressure:  140/66 mm Hg  FINDINGS:  Segment         Right        Left                            Diameter AP  Diameter AP  GSV Inguinal            3 8          7 5  GSV Prox Thigh          3 2          4 1  GSV Mid Thigh           2 1          2 6  GSV Dist Thigh          2 4          2 9  GSV Knee                2 5          3 0  GSV Prox Calf           1 5          1 7  GSV Mid Calf            1 0          1 5  GSV Dist Calf           1 2          1 9  GSV Ankle               1 0          1 0     CONCLUSION:  Impression:  RIGHT LOWER LIMB: The great saphenous vein is patent from the groin to the ankle  The vein is of adequate caliber and quality for graft conduit from the groin to the knee  LEFT LOWER LIMB: The great saphenous vein is patent from the groin to the ankle  The vein is of adequate caliber and quality for graft conduit from the groin to the knee  NOTE: There are multiple branches off of the bilateral greater saphenous veins in both the upper and lower leg  Tech note: Technical findings faxed to chart  SIGNATURE: Electronically Signed by: Tramaine Morales MD on 2020-08-18 04:29:34 PM    Xr Chest Portable Icu    Result Date: 8/19/2020  Narrative: CHEST INDICATION:   S/P open heart  COMPARISON:  December 7, 2011  EXAM PERFORMED/VIEWS:  XR CHEST PORTABLE ICU FINDINGS:  Endotracheal tube is present with its tip in satisfactory position above the level of the jonathon  An enteric tube is present with its tip in satisfactory position below the left hemidiaphragm  Right internal jugular central venous catheter is noted  Also noted is a [place] catheter from a right internal jugular approach with its tip overlying the main pulmonary artery  Mediastinal drains are present  Cardiomediastinal silhouette appears unremarkable  Mild pulmonary vascular congestion  Osseous structures appear within normal limits for patient age  Impression: Mildly vascular congestion status poststernotomy  No pneumothorax  Workstation performed: [id number]       Physical Exam:  General:  Obese, awake, alert and oriented x3, not in distress  Neck: supple, no JVD  Eyes: PERRL, conjunctiva normal  Chest:  Well-healing substernal surgery scar without any local discharge or tenderness  Lungs:  Bilateral air entry positive, no wheeze/rhonchi or crackle  Heart:  S1-S2 normal, mild systolic murmur in aortic area  Abdomen:  Soft ,nondistended ,nontender, bowel sounds positive  Extremities:  No leg edema, no deformity, ROM normal  Neuro:  Moving all extremities, speech clear  Skin: warm, no rash    /68 (BP Location: Left arm, Patient Position: Sitting, Cuff Size: Standard)   Pulse 59   Resp 18   Ht 5' 3" (1 6 m)   Wt 82 1 kg (181 lb)   SpO2 95%   BMI 32 06 kg/m²     Cardiographics :  ECG:  Sinus bradycardia, heart rate 56, possible left atrial enlargement, LVH with repolarization changes, ST T-wave changes in inferior and lateral leads present on previous EKG without any significant change      Assessment:    1  Atherosclerotic coronary artery disease without angina pectoris  Status post 1 vessel CABG SVG to OM2  On 08/19/2020 Catheterization showed focal mid LAD stenosis with positive IFR 0 88  Stable    2  Severe aortic valve stenosis  Status post aortic wall replacement 21 mm Morrison resilia pericardial tissue valve on 08/19/2020    3  Postoperative atrial fibrillation  Was in AFib less than 48 hour during hospital course and spontaneously converted to sinus rhythm     Patient is asymptomatic  Was not given amiodarone due to contrast allergy  Not on anticoagulation as converted to sinus rhythm  4  Hypertension  5  Hyperlipidemia  6  Obesity  7  Contrast allergy    Recommendations:  Patient is doing well from cardiology standpoint  Patient to continue aspirin  I would add Plavix in view of recent non-STEMI to complete dual antiplatelet for at least any year  Continue metoprolol tartrate 50 mg daily and Lipitor 80  One week of event monitoring to evaluate for paroxysmal Afib  Risk and benefit of oral anticoagulation discussed with patient  Patient understands and would like to wait until event monitoring is done  If patient shows any AFib then he will need oral anticoagulation  I would get repeat limited 2D echocardiogram post CABG/aortic valve replacement  Advised to start cardiac rehab and follow up with CT surgery as per schedule  Advised to take low-salt, low-fat/low-cholesterol diet  Repeat lipid panel and liver function test after 2 months  Return to clinic in 6 months or early as needed  Above all discussed with patient    Patient understands and agrees

## 2020-09-14 ENCOUNTER — TELEPHONE (OUTPATIENT)
Dept: CARDIOLOGY CLINIC | Facility: CLINIC | Age: 65
End: 2020-09-14

## 2020-09-23 ENCOUNTER — CLINICAL SUPPORT (OUTPATIENT)
Dept: CARDIAC REHAB | Facility: CLINIC | Age: 65
End: 2020-09-23
Payer: COMMERCIAL

## 2020-09-23 DIAGNOSIS — Z95.1 S/P CABG (CORONARY ARTERY BYPASS GRAFT): ICD-10-CM

## 2020-09-23 DIAGNOSIS — Z95.2 S/P AVR (AORTIC VALVE REPLACEMENT): ICD-10-CM

## 2020-09-23 DIAGNOSIS — I35.0 NONRHEUMATIC AORTIC VALVE STENOSIS: ICD-10-CM

## 2020-09-23 DIAGNOSIS — I25.10 CORONARY ARTERY DISEASE INVOLVING NATIVE CORONARY ARTERY OF NATIVE HEART WITHOUT ANGINA PECTORIS: ICD-10-CM

## 2020-09-23 PROCEDURE — 93797 PHYS/QHP OP CAR RHAB WO ECG: CPT

## 2020-09-23 NOTE — PROGRESS NOTES
CARDIAC REHAB ASSESSMENT    Today's date: 2020  Patient name: Isaias Sampson     : 1955       MRN: [id number]  PCP: Raul Charles MD  Referring Physician: Eden Verma PA-C   Cardiologist: Nemo Hurst MD  Surgeon: Jacqui Rosen,    Dx:   Encounter Diagnoses   Name Primary?  S/P AVR (aortic valve replacement)     S/P CABG (coronary artery bypass graft)     Nonrheumatic aortic valve stenosis     Coronary artery disease involving native coronary artery of native heart without angina pectoris        Date of onset: 2020  Cultural needs: None     Height:    Wt Readings from Last 1 Encounters:   20 82 1 kg (181 lb)      Weight:   Ht Readings from Last 1 Encounters:   20 5' 3" (1 6 m)     Medical History:   Past Medical History:   Diagnosis Date    HLD (hyperlipidemia)     HTN (hypertension)          Physical Limitations: L ACL and L rotator cuff     Risk Factors   Cholesterol: Yes  Smoking: Former user quit 47yrs ago   HTN: Yes  DM: No  Obesity: Yes   Inactivity: Yes  Stress:  perceived  stress:  3/10   Stressors: Job stressors    Goals for Stress Management: exercise and meditation     Family History:No family history on file      Allergies: Contrast dye [iodinated diagnostic agents]  ETOH:   Social History     Substance and Sexual Activity   Alcohol Use Not Currently         Current Medications:   Current Outpatient Medications   Medication Sig Dispense Refill    acetaminophen (TYLENOL) 325 mg tablet Take 2 tablets (650 mg total) by mouth every 6 (six) hours as needed for mild pain or headaches (Patient not taking: Reported on 2020) 30 tablet 0    atorvastatin (LIPITOR) 80 mg tablet Take 1 tablet (80 mg total) by mouth daily with dinner 30 tablet 2    clopidogrel (PLAVIX) 75 mg tablet Take 1 tablet (75 mg total) by mouth daily 90 tablet 3    docusate sodium (COLACE) 100 mg capsule Take 1 capsule (100 mg total) by mouth 2 (two) times a day 60 capsule 0    metoprolol tartrate (LOPRESSOR) 50 mg tablet Take 1 tablet (50 mg total) by mouth every 12 (twelve) hours 60 tablet 2    omeprazole (PriLOSEC) 20 mg delayed release capsule Take 1 capsule (20 mg total) by mouth daily 30 capsule 0    oxyCODONE (ROXICODONE) 5 mg immediate release tablet Take 1/2 tablet (2 5mg) every 4 hours as needed for moderate pain or take 1 tablet (5mg) every 6 hours as needed for severe pain  (Patient not taking: Reported on 9/8/2020) 30 tablet 0    polyethylene glycol (MIRALAX) 17 g packet Take 17 g by mouth daily as needed (for constipation) (Patient not taking: Reported on 9/8/2020) 14 each 0    potassium chloride (K-DUR,KLOR-CON) 20 mEq tablet Take 1 tablet (20 mEq total) by mouth daily for 7 days 7 tablet 1    torsemide (DEMADEX) 20 mg tablet Take 1 tablet (20 mg total) by mouth daily for 7 days 7 tablet 1     No current facility-administered medications for this visit  Functional Status Prior to Diagnosis for Treatment   Occupation: full time job OT   Recreation: None   ADLs: No limitations  [place]: able to perform self-care resumed driving  Exercise: None   Other: Cares for Wife who has MS    Current Functional Status  Occupation: full time job OT  plans to return to work when cleared   Recreation: None   ADLs:resumed all ADLs within sternal precautions  [place]: able to perform self-care resumed driving  Exercise: None   Other: Cares for wife who has MS    Patient Specific Goals:   Increase strength, decrease fatigue, improve sleep, get back to work, lose weight, maintain heart healthy diet     Short Term Program Goals: dietary modifications increased strength improved energy/stamina with ADLs exercise 120-150 mins/wk wt loss 1-2 ppw return to work    Long Term Goals: increased intial training workload  Improved [facility] Activity Status score  Improved functional capacity  Improved Quality of Life - [facility] score reduced  Improved lipid profile  Reduced body fat%  Reduced waist circumference  Abstain from smoking  Reduced stress  improved Rate Your Plate Score    Ability to reach goals/rehabilitation potential:  Excellent    Projected return to function: 12 weeks  Objective tests: sub-max TM ETT      Nutritional   Reviewed details of Rate your Plate  Discussed key elements of heart healthy eating  Reviewed patient goals for dietary modifications and their clinical implications  Reviewed most recent lipid profile  Low sodium diet 1800mg daily, no red meat, increased fruit, chicken and salmon, salad, minimal cheese, cereal - wheat, oatmeal, special K, not many sweets   Snacks - banana with PB, snack pea chips, fruit     Goals for dietary modification: choose lean cuts of meat  poultry without the skin  low fat ground meat and poultry  eliminate processed meats  reduce portions of meat to 3 oz  increase fish intake  more meatless meals  low fat dairy   reduced fat cheese  increase whole grains  increase fruits and vegetables  eliminate butter  low sodium  improved snack choices  more nuts/seeds  reduce sweets/frozen desserts  heathier choices while dining out      Emotional/Social  Patient reports he/she is coping well with good social support and denies depression or anxiety    SOCIAL SUPPORT NETWORK    Marital status:     Rate 1-5:    Marriage: 5   Family: 5   Financial: 5   Relationships: 5   Spirituality: 5   Intellectual: 5      Domestic Violence Screening: No    Comments: NSTEMI on 8/15/2020  Pt went to the [facility] with chest pain - felt like heart burn  EF = 60% 8/16/2020  Cath showed 50% block of prox LAD, 50% of mid LAD, 50% of D2, and 100% of LCX  Allergic to contrast dye  Transferred to [facility] for CABGx1 and AVR as well  Had Post-op Afib, no evidence since  Compliant with sternal precaution  L knee ACL surgery and L rotator cuff surgery  Problems with sleeping, not sleeping normal hours

## 2020-09-23 NOTE — PROGRESS NOTES
Cardiac Rehabilitation Plan of Care   Care Plan       Today's date: 2020   # of Exercise Sessions Completed: 1  Patient name: Cyndi Matthews      : 1955  Age: 72 y o  MRN: [id number]  Referring Physician: Jean-Claude Garcia PA-C  Cardiologist: Scott Bansal MD  Provider: [address]  Clinician: Kishor Bolden MS, [facility], [facility], [place], operated by [facility]    Dx:   Encounter Diagnoses   Name Primary?  S/P AVR (aortic valve replacement)     S/P CABG (coronary artery bypass graft)     Nonrheumatic aortic valve stenosis     Coronary artery disease involving native coronary artery of native heart without angina pectoris      Date of onset: 2020      SUMMARY OF PROGRESS:  This is Vasile's initial eval for cardiac rehab  He is attending for recent CABGx1 and AVR performed on 2020  Parag Carmichael reported some symptoms of heart burn that brought him to the hospital at which he had a NSTEMI on 8/15/2020  Parag Carmichael reported to have no cardiac complications since his surgery and has good healing at his incision site  He has a Hx of L ACL surgery and L rotator cuff surgery  Today he completed a submax ETT at which he walked for 5:30min at 3 1 METs before reaching termination criteria of 30bpm+resting HR  Parag Carmichael had normal hemodynamic response to exercise  On telemetry he was NST with TWI and nonspecific ST depression during rest and with exercise  Parag Carmichael reports that he is learning about a heart healthy diet and making changes  He is focusing right now on a low sodium diet having 1800mg/day, cutting out red meat, and increasing his fruits/veggies  Parag Amol reports that he hopes to learn more about a heart healthy diet and the benefits of following one  He denied any added stress however reports that he does get stressed out on occasion 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  He hopes to go back to work as well to be able to provide for his family   Parag Carmichael also denied any feelings of depression and anxiety anymore however reported to have some immediately out of surgery  His PHQ-9 and ALLAN-7 were a 7 and 6 respectively suggesting mild depression and anxiety  He was given info on behavioral health and [employer] and reports he has joined support groups online for added help  Karthik Elizabeth will be reassessed in 30 days  His goals include increasing his strength/endurance, get back to work, decrease fatigue, and lose some weight will in CR  Karthik Elizabeth will start CR on 2020  Medication compliance: Yes   Comments: None   Fall Risk: Low   Comments: None     EKG changes: NSR with TWI and nonspecific ST depression       EXERCISE ASSESSMENT and PLAN    Current Exercise Program in Rehab:       Frequency: 3 days/week Supplement with home exercise 2+ days/wk as tolerated       Minutes: 30-35         METS: 2 5-3 5            HR:    RPE: 3-5         Modalities: Treadmill, UBE and Lifecycle      Exercise Progression 30 Day Goals :    Frequency: 3 days/week      Supplement with home exercise 2+ days/wk as tolerated    Minutes: 35-40   METS: 3-4   HR:     RPE: 4-6   Modalities: Treadmill, Airdyne bike, UBE and Lifecycle    Strength trainin-3 days / week  12-15 repetitions  1-2 sets per modality   Will be added following at least 8 weeks post surgery and 8-10 monitored sessions   Modalities: Leg Press, Chest Press, Pull Downs, Arm Curl and Seated Row    Progressing:   In Progress    Home Exercise: None    Goals: 10% improvement in functional capacity, improved DASI score by 10%, Increase in peak CR METs by 40%, Resume ADLs with increased strength, Return to work unrestricted and Exercise 5 days/wk, >150mins/wk  Education: home exercise guidelines, signs and sxs and RPE scale   Plan:home exercise 30+ mins 2 days opposite CR and Education class: Risk Factors for Heart Disease  Readiness to change: Preparation:  (Getting ready to change)       NUTRITION ASSESSMENT AND PLAN    Weight control: Starting weight: 184 5 lbs    Current weight:     Waist circumference:    Startinin    Current:    Diabetes: N/A  Lipid management: Discussed diet and lipid management and Last lipid profile 2020  Chol 205    HDL 39    Goals:reduced BMI to < 25, LDL <100, HDL >40, TRG <150, CHOL <200, decreased body fat% <25%   , reduced waist circumference <35 inches, Improved Rate Your Plate score  >64 and 2 5-5%  wt loss  Education: heart healthy eating  low sodium diet  hydration  diet and lipid management  diabetes management and exercise  wt  loss  healthy choices while dining out  portion control  Progressing: In Progress  Plan: Education class: Reading Food Labels, Education Class: Heart Healthy Eating, Increase PUFA and MUFA, Decrease SFA, Increase whole grains, increase fruits/vegs, eliminate processed meats, reduce red meat 1x/wk, swtich to low fat dairy, Reduce added sugars <25g/day, avoid processed foods and reduce dietary sodium  Readiness to change: Preparation:  (Getting ready to change)       PSYCHOSOCIAL ASSESSMENT AND PLAN    Emotional:  Depression assessment:  PHQ-9 = 1-4 = Minimal Depression            Anxiety measure:  ALLAN-7 = 0-4  = Not anxious  Self-reported stress level:  3  Social support: Excellent  Goals:  Reduce perceived stress to 1-3/10, Physical Fitness in [facility] Score < 3, Social Support in [facility] Score < 3, Daily Activity in [facility] Score < 3, Overall Health in [facility] Score < 3, Change in Health in [place] Score < 3 , improved sleep, improved concentration, improved positive thoughts of well being and increased energy  Education: signs/sxs of depression and benefits of positive support system  Progressing: In Progress  Plan: Class: Stress and Your Health, Class: Relaxation, PHQ-9 >5 will refer to MD, Practice relaxation techniques and Refer to  [address] to change: Preparation:  (Getting ready to change)       OTHER CORE COMPONENTS     Tobacco: Social History     Tobacco Use   Smoking Status Never Smoker   Smokeless Tobacco Current User    Types: Snuff       Tobacco Use Intervention:   N/A: Pt has a remote history of smoking  Brief counseling by cardiac rehab professional  Date: 2020    Blood pressure:    Restin/70   Exercise: 150/66    Goals: consistent BP < 130/80, reduced dietary sodium <2300mg, consistent exercise >150 mins/wk and medication compliance  Education:  understanding HTN and CAD and low sodium diet and HTN  Progressing: In Progress  Plan: Class: Understanding Heart Disease, Class: Common Heart Medications and Monitor BP at home  Readiness to change: Preparation:  (Getting ready to change)

## 2020-09-24 ENCOUNTER — OFFICE VISIT (OUTPATIENT)
Dept: CARDIAC SURGERY | Facility: CLINIC | Age: 65
End: 2020-09-24

## 2020-09-24 VITALS
HEART RATE: 62 BPM | DIASTOLIC BLOOD PRESSURE: 76 MMHG | RESPIRATION RATE: 18 BRPM | SYSTOLIC BLOOD PRESSURE: 132 MMHG | WEIGHT: 181 LBS | BODY MASS INDEX: 32.07 KG/M2 | HEIGHT: 63 IN | TEMPERATURE: 97.8 F

## 2020-09-24 DIAGNOSIS — Z95.2 S/P AVR (AORTIC VALVE REPLACEMENT): ICD-10-CM

## 2020-09-24 DIAGNOSIS — I21.4 NSTEMI (NON-ST ELEVATED MYOCARDIAL INFARCTION) (HCC): ICD-10-CM

## 2020-09-24 DIAGNOSIS — I97.89 POSTOPERATIVE ATRIAL FIBRILLATION (HCC): ICD-10-CM

## 2020-09-24 DIAGNOSIS — Z95.1 S/P CABG (CORONARY ARTERY BYPASS GRAFT): ICD-10-CM

## 2020-09-24 DIAGNOSIS — I10 ACCELERATED HYPERTENSION: Primary | ICD-10-CM

## 2020-09-24 DIAGNOSIS — D69.6 THROMBOCYTOPENIA (HCC): ICD-10-CM

## 2020-09-24 DIAGNOSIS — E78.00 PURE HYPERCHOLESTEROLEMIA: Chronic | ICD-10-CM

## 2020-09-24 DIAGNOSIS — I25.10 CORONARY ARTERY DISEASE INVOLVING NATIVE CORONARY ARTERY OF NATIVE HEART WITHOUT ANGINA PECTORIS: ICD-10-CM

## 2020-09-24 DIAGNOSIS — I48.91 POSTOPERATIVE ATRIAL FIBRILLATION (HCC): ICD-10-CM

## 2020-09-24 DIAGNOSIS — I35.0 NONRHEUMATIC AORTIC VALVE STENOSIS: ICD-10-CM

## 2020-09-24 PROCEDURE — 99024 POSTOP FOLLOW-UP VISIT: CPT | Performed by: PHYSICIAN ASSISTANT

## 2020-09-24 NOTE — LETTER
September 24, 2020     Rosalind Acosta MD  [address] [facility] [address] [facility]    Patient: Federico Heath   YOB: 1955   Date of Visit: 9/24/2020       Dear Dr Rukhsana Huff: Thank you for referring Federico Heath to me for evaluation  Below are my notes for this consultation  If you have questions, please do not hesitate to call me  I look forward to following your patient along with you  Sincerely,        Nahomy Landrum MD        CC: No Recipients  Zheng Wang PA-C  9/24/2020  4:25 PM  Attested  Post OP Follow-up Appointment    Procedure: S/P aortic valve replacement and coronary artery bypass grafting, performed on 8/19/20    History: Federico Heath is a 72y o  year old male who presents to our office today for routine follow up care from aortic valve replacement and coronary artery bypass grafting  Patients post op course was complicated by periop atrial fibrillation  He ultimately converted to NSR, he was not discharged on anticoagulation  Since being discharged to home, he is relatively free of complaints  he does have some routine incisional discomfort  he denies any presyncope, syncope, fever, chills, drainage or irritation of incisions, SOB, dyspnea, PND, edema or significant weight gain  he denies fever, chills, constitutional symptoms or GI distress  Patient has been seen by PCP and Cardiologist  he is scheduled to start cardiac rehab in the near future  Vital Signs:   Vitals:    09/24/20 1608   BP: 132/76   Pulse: 62   Resp: 18   Temp: 97 8 °F (36 6 °C)   Weight: 82 1 kg (181 lb)   Height: 5' 3" (1 6 m)       Home Medications:   Prior to Admission medications    Medication Sig Start Date End Date Taking?  Authorizing Provider   atorvastatin (LIPITOR) 80 mg tablet Take 1 tablet (80 mg total) by mouth daily with dinner 8/22/20   Magdalene Connors PA-C   clopidogrel (PLAVIX) 75 mg tablet Take 1 tablet (75 mg total) by mouth daily 9/8/20 Scott Bansal MD   docusate sodium (COLACE) 100 mg capsule Take 1 capsule (100 mg total) by mouth 2 (two) times a day 8/22/20 9/21/20  Linh Morrison PA-C   metoprolol tartrate (LOPRESSOR) 50 mg tablet Take 1 tablet (50 mg total) by mouth every 12 (twelve) hours 8/22/20   Linh Morrison PA-C   omeprazole (PriLOSEC) 20 mg delayed release capsule Take 1 capsule (20 mg total) by mouth daily 8/22/20 9/21/20  Linh Morrison PA-C   polyethylene glycol (MIRALAX) 17 g packet Take 17 g by mouth daily as needed (for constipation)  Patient not taking: Reported on 9/8/2020 8/22/20   Linh Morrison PA-C   potassium chloride (K-DUR,KLOR-CON) 20 mEq tablet Take 1 tablet (20 mEq total) by mouth daily for 7 days 8/22/20 8/29/20  Linh Morrison PA-C   torsemide (DEMADEX) 20 mg tablet Take 1 tablet (20 mg total) by mouth daily for 7 days 8/22/20 8/29/20  Linh Morrison PA-C   acetaminophen (TYLENOL) 325 mg tablet Take 2 tablets (650 mg total) by mouth every 6 (six) hours as needed for mild pain or headaches  Patient not taking: Reported on 9/8/2020 8/22/20 9/24/20  Linh Morrison PA-C   aspirin 325 mg tablet Take 1 tablet (325 mg total) by mouth daily 8/23/20 9/8/20  Linh Morrison PA-C   oxyCODONE (ROXICODONE) 5 mg immediate release tablet Take 1/2 tablet (2 5mg) every 4 hours as needed for moderate pain or take 1 tablet (5mg) every 6 hours as needed for severe pain  Patient not taking: Reported on 9/8/2020 8/22/20 9/24/20  Linh Morrison PA-C       Physical Exam:    HEENT/NECK:  PERRLA  No jugular venous distention  Cardiac:Regular rate and rhythm  Pulmonary:Breath sounds clear bilaterally  Abdomen:  Non-tender, Non-distended  Positive bowel sounds  Lower extremities: Extremities warm/dry  Radial/PT/DP pules 2+ bilaterally  Trace edema B/L  Incisions: Sternum is stable  Incision is clean, dry, and intact  and Saphenectomy incison is clean, dry, and intact  Neuro: Alert and oriented X 3  Sensation is grossly intact  No focal deficits  Skin: Warm/Dry, without rashes or lesions  Lab Results:               Invalid input(s): LABGLOM      Lab Results   Component Value Date    HGBA1C 5 5 08/19/2020     Lab Results   Component Value Date    TROPONINI 13 16 (H) 08/16/2020         Assessment: Aortic stenosis, Non-Rheumatic, Coronary artery disease  S/P aortic valve replacement and coronary artery bypass grafting;    Plan:     Yuliya Champion continues to recover well following aortic valve replacement and coronary artery bypass grafting  To date they have made progressive improvements physical rehabilitation  At this point I have cleared them to begin outpatient cardiac rehabilitation and have encouraged them to to do so  Yuliya Champion has also been cleared to resume driving at this point  I asked that them do so in progressive increments  I did remind them of their ongoing lifting restrictions of 25 pounds for an additional 2 months  Yuliya Champion has already been evaluated by their primary care physician cardiologist for ongoing medical care  At this point I have not scheduled them for routine followup care with our office  I have advised them to call with any new concerns that may arise  Yuliya Champion was comfortable with our recommendations and their questions were answered to their satisfaction  Nicole Chow PA-C  09/24/20  Attestation signed by Albert Sanchez MD at 9/24/2020  5:01 PM:  Patient seen and evaluated with Phani Delvalle PA-C  I agree with the above assessment and plan with the following additions       Normal postop  No complications    Plan:  Cardiac rehab  Follow up with cardiology and PCP

## 2020-09-24 NOTE — PROGRESS NOTES
Post OP Follow-up Appointment    Procedure: S/P aortic valve replacement and coronary artery bypass grafting, performed on 8/19/20    History: Roman Olivarez is a 72y o  year old male who presents to our office today for routine follow up care from aortic valve replacement and coronary artery bypass grafting  Patients post op course was complicated by periop atrial fibrillation  He ultimately converted to NSR, he was not discharged on anticoagulation  Since being discharged to home, he is relatively free of complaints  he does have some routine incisional discomfort  he denies any presyncope, syncope, fever, chills, drainage or irritation of incisions, SOB, dyspnea, PND, edema or significant weight gain  he denies fever, chills, constitutional symptoms or GI distress  Patient has been seen by PCP and Cardiologist  he is scheduled to start cardiac rehab in the near future  Vital Signs:   Vitals:    09/24/20 1608   BP: 132/76   Pulse: 62   Resp: 18   Temp: 97 8 °F (36 6 °C)   Weight: 82 1 kg (181 lb)   Height: 5' 3" (1 6 m)       Home Medications:   Prior to Admission medications    Medication Sig Start Date End Date Taking?  Authorizing Provider   atorvastatin (LIPITOR) 80 mg tablet Take 1 tablet (80 mg total) by mouth daily with dinner 8/22/20   Colby Solis PA-C   clopidogrel (PLAVIX) 75 mg tablet Take 1 tablet (75 mg total) by mouth daily 9/8/20   Kellie Ahmadi MD   docusate sodium (COLACE) 100 mg capsule Take 1 capsule (100 mg total) by mouth 2 (two) times a day 8/22/20 9/21/20  Colby Solis PA-C   metoprolol tartrate (LOPRESSOR) 50 mg tablet Take 1 tablet (50 mg total) by mouth every 12 (twelve) hours 8/22/20   Colby Solis PA-C   omeprazole (PriLOSEC) 20 mg delayed release capsule Take 1 capsule (20 mg total) by mouth daily 8/22/20 9/21/20  Colby Solis PA-C   polyethylene glycol (MIRALAX) 17 g packet Take 17 g by mouth daily as needed (for constipation)  Patient not taking: Reported on 9/8/2020 8/22/20   Ryan Carmichael PA-C   potassium chloride (K-DUR,KLOR-CON) 20 mEq tablet Take 1 tablet (20 mEq total) by mouth daily for 7 days 8/22/20 8/29/20  Ryan Carmichael PA-C   torsemide [facility]) 20 mg tablet Take 1 tablet (20 mg total) by mouth daily for 7 days 8/22/20 8/29/20  Ryan Carmichael PA-C   acetaminophen (TYLENOL) 325 mg tablet Take 2 tablets (650 mg total) by mouth every 6 (six) hours as needed for mild pain or headaches  Patient not taking: Reported on 9/8/2020 8/22/20 9/24/20  Ryan Carmichael PA-C   aspirin 325 mg tablet Take 1 tablet (325 mg total) by mouth daily 8/23/20 9/8/20  Ryan Carmichael PA-C   oxyCODONE (ROXICODONE) 5 mg immediate release tablet Take 1/2 tablet (2 5mg) every 4 hours as needed for moderate pain or take 1 tablet (5mg) every 6 hours as needed for severe pain  Patient not taking: Reported on 9/8/2020 8/22/20 9/24/20  Ryan Carmichael PA-C       Physical Exam:    HEENT/NECK:  PERRLA  No jugular venous distention  Cardiac:Regular rate and rhythm  Pulmonary:Breath sounds clear bilaterally  Abdomen:  Non-tender, Non-distended  Positive bowel sounds  Lower extremities: Extremities warm/dry  Radial/PT/DP pules 2+ bilaterally  Trace edema B/L  Incisions: Sternum is stable  Incision is clean, dry, and intact  and Saphenectomy incison is clean, dry, and intact  Neuro: Alert and oriented X 3  Sensation is grossly intact  No focal deficits  Skin: Warm/Dry, without rashes or lesions  Lab Results:               Invalid input(s): LABGLOM      Lab Results   Component Value Date    HGBA1C 5 5 08/19/2020     Lab Results   Component Value Date    TROPONINI 13 16 (H) 08/16/2020         Assessment: Aortic stenosis, Non-Rheumatic, Coronary artery disease  S/P aortic valve replacement and coronary artery bypass grafting;    Plan:     Ene Browning continues to recover well following aortic valve replacement and coronary artery bypass grafting    To date they have made progressive improvements physical rehabilitation  At this point I have cleared them to begin outpatient cardiac rehabilitation and have encouraged them to to do so  Ree Ross has also been cleared to resume driving at this point  I asked that them do so in progressive increments  I did remind them of their ongoing lifting restrictions of 25 pounds for an additional 2 months  Ree Ross has already been evaluated by their primary care physician cardiologist for ongoing medical care  At this point I have not scheduled them for routine followup care with our office  I have advised them to call with any new concerns that may arise  Ree Ross was comfortable with our recommendations and their questions were answered to their satisfaction      Sofi Johnson PA-C  09/24/20

## 2020-09-24 NOTE — LETTER
September 24, 2020     Patient: Chris Edmonds   YOB: 1955   Date of Visit: 9/24/2020       To Whom it May Concern:    Chris Edmonds is under my professional care  He was seen in my office on 9/24/2020  He may return to work with limitations on 10/19/20  Patient should resume work with restricted lifting of 25lbs  He may return to full duty on 11/19/20       If you have any questions or concerns, please don't hesitate to call           Sincerely,          Kaylene Lopez PA-C        CC: No Recipients

## 2020-09-25 ENCOUNTER — APPOINTMENT (OUTPATIENT)
Dept: CARDIAC REHAB | Facility: CLINIC | Age: 65
End: 2020-09-25
Payer: COMMERCIAL

## 2020-09-26 ENCOUNTER — CLINICAL SUPPORT (OUTPATIENT)
Dept: CARDIAC REHAB | Facility: CLINIC | Age: 65
End: 2020-09-26
Payer: COMMERCIAL

## 2020-09-26 DIAGNOSIS — Z95.1 S/P CABG (CORONARY ARTERY BYPASS GRAFT): ICD-10-CM

## 2020-09-26 PROCEDURE — 93798 PHYS/QHP OP CAR RHAB W/ECG: CPT

## 2020-09-28 ENCOUNTER — TELEPHONE (OUTPATIENT)
Dept: CARDIOLOGY CLINIC | Facility: CLINIC | Age: 65
End: 2020-09-28

## 2020-09-28 ENCOUNTER — CLINICAL SUPPORT (OUTPATIENT)
Dept: CARDIOLOGY CLINIC | Facility: CLINIC | Age: 65
End: 2020-09-28
Payer: COMMERCIAL

## 2020-09-28 DIAGNOSIS — I48.91 POSTOPERATIVE ATRIAL FIBRILLATION (HCC): ICD-10-CM

## 2020-09-28 DIAGNOSIS — I35.0 NONRHEUMATIC AORTIC VALVE STENOSIS: ICD-10-CM

## 2020-09-28 DIAGNOSIS — I48.0 PAROXYSMAL ATRIAL FIBRILLATION (HCC): Primary | ICD-10-CM

## 2020-09-28 DIAGNOSIS — I21.4 NSTEMI (NON-ST ELEVATED MYOCARDIAL INFARCTION) (HCC): ICD-10-CM

## 2020-09-28 DIAGNOSIS — I97.89 POSTOPERATIVE ATRIAL FIBRILLATION (HCC): ICD-10-CM

## 2020-09-28 PROCEDURE — 93228 REMOTE 30 DAY ECG REV/REPORT: CPT | Performed by: INTERNAL MEDICINE

## 2020-09-28 NOTE — TELEPHONE ENCOUNTER
----- Message from Eliana Carmona MD sent at 9/28/2020  3:42 PM EDT -----  I called the patient but unable to reach him  Inform the patient that there were 2 episode of paroxymal atrial fibrillation on event monitoring  As he still has paroxymal a fib episodes he should take eliquis 5 mg twice a day  He should continue his aspirin   He should stop plavix once he start eliquis

## 2020-09-29 ENCOUNTER — CLINICAL SUPPORT (OUTPATIENT)
Dept: CARDIAC REHAB | Facility: CLINIC | Age: 65
End: 2020-09-29
Payer: COMMERCIAL

## 2020-09-29 DIAGNOSIS — Z95.1 S/P CABG (CORONARY ARTERY BYPASS GRAFT): ICD-10-CM

## 2020-09-29 PROCEDURE — 93798 PHYS/QHP OP CAR RHAB W/ECG: CPT

## 2020-09-30 ENCOUNTER — TELEPHONE (OUTPATIENT)
Dept: CARDIOLOGY CLINIC | Facility: CLINIC | Age: 65
End: 2020-09-30

## 2020-09-30 DIAGNOSIS — I48.0 PAROXYSMAL ATRIAL FIBRILLATION (HCC): ICD-10-CM

## 2020-09-30 DIAGNOSIS — I25.10 CORONARY ARTERY DISEASE INVOLVING NATIVE CORONARY ARTERY OF NATIVE HEART WITHOUT ANGINA PECTORIS: Primary | ICD-10-CM

## 2020-09-30 RX ORDER — ASPIRIN 81 MG/1
81 TABLET ORAL DAILY
Qty: 90 TABLET | Refills: 3 | Status: SHIPPED | OUTPATIENT
Start: 2020-09-30

## 2020-09-30 NOTE — TELEPHONE ENCOUNTER
Dr Aniya Nicole was questioning why we were notified of an abnormal EKG on 9/30/2020 at 1:22 pm when the event happened on 9/26/2020 at 4 pm   Called Natalie and spoke with Otilio  He stated that the event was a patient triggered event that happened on 9/26/2020 at 4pm   I asked why we were notified 5 days later  First I was told that since it happened on a weekend we would not be notified until the next business day and they tried to call us on 9/28/2020 but our office was closed  Advised Otilio that our office was open on Monday 9/28/2020, he then said maybe they tried to call after hours  I asked why they didn't call back Tuesday and he said all he could tell me was that maybe they were very busy and didn't get to us until Wednesday 9/30/2020  Advised Dr Aniya Nicole of the conversation

## 2020-09-30 NOTE — TELEPHONE ENCOUNTER
Prior auth for Eliquis submitted on Covermymeds  Eliquis 5 mg was APPROVED 8/31/2020 - 9/30/2021  CASE ID # [id number]    New script pending for pharmacy with auth information

## 2020-09-30 NOTE — TELEPHONE ENCOUNTER
I discussed with patient and inform him that his Eliquis is authorize  Patient is taking metoprolol tartrate 50 mg daily advised him to take twice a day  I advised him to take baby aspirin and continue Eliquis    He should stop taking Plavix  He was also advised to let us know if he feels palpitation  Patient understands and agrees

## 2020-09-30 NOTE — TELEPHONE ENCOUNTER
I left a message for patient to call back  I spoke with the pharmacy so we can start prior auth on Eliquis through [employer]   Phone # [phone]  BIN: [id number]  PCN: [id number]    ID# [id number]  Group # [id number]    I pulled the report from [place] regarding the Urgent/abnormal EKG  I faxed it to Wiley Avery for you to review

## 2020-09-30 NOTE — TELEPHONE ENCOUNTER
Patient called and stated that a script for apixaban (ELIQUIS) 5 mg was sent to [facility]  Pt states prior-authorization is needed for medication  Pt would like a call back

## 2020-10-01 ENCOUNTER — CLINICAL SUPPORT (OUTPATIENT)
Dept: CARDIAC REHAB | Facility: CLINIC | Age: 65
End: 2020-10-01
Payer: COMMERCIAL

## 2020-10-01 DIAGNOSIS — Z95.1 S/P CABG (CORONARY ARTERY BYPASS GRAFT): ICD-10-CM

## 2020-10-01 PROCEDURE — 93798 PHYS/QHP OP CAR RHAB W/ECG: CPT

## 2020-10-02 ENCOUNTER — CLINICAL SUPPORT (OUTPATIENT)
Dept: CARDIAC REHAB | Facility: CLINIC | Age: 65
End: 2020-10-02
Payer: COMMERCIAL

## 2020-10-02 DIAGNOSIS — Z95.1 S/P CABG (CORONARY ARTERY BYPASS GRAFT): ICD-10-CM

## 2020-10-02 PROCEDURE — 93798 PHYS/QHP OP CAR RHAB W/ECG: CPT

## 2020-10-06 ENCOUNTER — CLINICAL SUPPORT (OUTPATIENT)
Dept: CARDIAC REHAB | Facility: CLINIC | Age: 65
End: 2020-10-06
Payer: COMMERCIAL

## 2020-10-06 DIAGNOSIS — Z95.1 S/P CABG (CORONARY ARTERY BYPASS GRAFT): ICD-10-CM

## 2020-10-06 PROCEDURE — 93798 PHYS/QHP OP CAR RHAB W/ECG: CPT

## 2020-10-08 ENCOUNTER — CLINICAL SUPPORT (OUTPATIENT)
Dept: CARDIAC REHAB | Facility: CLINIC | Age: 65
End: 2020-10-08
Payer: COMMERCIAL

## 2020-10-08 DIAGNOSIS — Z95.1 S/P CABG (CORONARY ARTERY BYPASS GRAFT): ICD-10-CM

## 2020-10-08 PROCEDURE — 93798 PHYS/QHP OP CAR RHAB W/ECG: CPT

## 2020-10-09 ENCOUNTER — CLINICAL SUPPORT (OUTPATIENT)
Dept: CARDIAC REHAB | Facility: CLINIC | Age: 65
End: 2020-10-09
Payer: COMMERCIAL

## 2020-10-09 DIAGNOSIS — Z95.1 S/P CABG (CORONARY ARTERY BYPASS GRAFT): ICD-10-CM

## 2020-10-09 PROCEDURE — 93798 PHYS/QHP OP CAR RHAB W/ECG: CPT

## 2020-10-13 ENCOUNTER — CLINICAL SUPPORT (OUTPATIENT)
Dept: CARDIAC REHAB | Facility: CLINIC | Age: 65
End: 2020-10-13
Payer: COMMERCIAL

## 2020-10-13 DIAGNOSIS — Z95.1 S/P CABG (CORONARY ARTERY BYPASS GRAFT): ICD-10-CM

## 2020-10-13 PROCEDURE — 93798 PHYS/QHP OP CAR RHAB W/ECG: CPT

## 2020-10-15 ENCOUNTER — CLINICAL SUPPORT (OUTPATIENT)
Dept: CARDIAC REHAB | Facility: CLINIC | Age: 65
End: 2020-10-15
Payer: COMMERCIAL

## 2020-10-15 DIAGNOSIS — Z95.1 S/P CABG (CORONARY ARTERY BYPASS GRAFT): ICD-10-CM

## 2020-10-15 PROCEDURE — 93798 PHYS/QHP OP CAR RHAB W/ECG: CPT

## 2020-10-16 ENCOUNTER — CLINICAL SUPPORT (OUTPATIENT)
Dept: CARDIAC REHAB | Facility: CLINIC | Age: 65
End: 2020-10-16
Payer: COMMERCIAL

## 2020-10-16 DIAGNOSIS — Z95.1 S/P CABG (CORONARY ARTERY BYPASS GRAFT): ICD-10-CM

## 2020-10-16 PROCEDURE — 93798 PHYS/QHP OP CAR RHAB W/ECG: CPT

## 2020-10-20 ENCOUNTER — TELEPHONE (OUTPATIENT)
Dept: CARDIOLOGY CLINIC | Facility: CLINIC | Age: 65
End: 2020-10-20

## 2020-10-20 ENCOUNTER — CLINICAL SUPPORT (OUTPATIENT)
Dept: CARDIAC REHAB | Facility: CLINIC | Age: 65
End: 2020-10-20
Payer: COMMERCIAL

## 2020-10-20 DIAGNOSIS — Z95.1 S/P CABG (CORONARY ARTERY BYPASS GRAFT): ICD-10-CM

## 2020-10-20 PROCEDURE — 93798 PHYS/QHP OP CAR RHAB W/ECG: CPT

## 2020-10-22 ENCOUNTER — CLINICAL SUPPORT (OUTPATIENT)
Dept: CARDIAC REHAB | Facility: CLINIC | Age: 65
End: 2020-10-22
Payer: COMMERCIAL

## 2020-10-22 DIAGNOSIS — Z95.1 S/P CABG (CORONARY ARTERY BYPASS GRAFT): ICD-10-CM

## 2020-10-22 PROCEDURE — 93798 PHYS/QHP OP CAR RHAB W/ECG: CPT

## 2020-10-23 ENCOUNTER — APPOINTMENT (OUTPATIENT)
Dept: CARDIAC REHAB | Facility: CLINIC | Age: 65
End: 2020-10-23
Payer: COMMERCIAL

## 2020-10-24 ENCOUNTER — CLINICAL SUPPORT (OUTPATIENT)
Dept: CARDIAC REHAB | Facility: CLINIC | Age: 65
End: 2020-10-24
Payer: COMMERCIAL

## 2020-10-24 DIAGNOSIS — Z95.1 S/P CABG (CORONARY ARTERY BYPASS GRAFT): ICD-10-CM

## 2020-10-24 PROCEDURE — 93798 PHYS/QHP OP CAR RHAB W/ECG: CPT

## 2020-10-27 ENCOUNTER — CLINICAL SUPPORT (OUTPATIENT)
Dept: CARDIAC REHAB | Facility: CLINIC | Age: 65
End: 2020-10-27
Payer: COMMERCIAL

## 2020-10-27 DIAGNOSIS — Z95.1 S/P CABG (CORONARY ARTERY BYPASS GRAFT): ICD-10-CM

## 2020-10-27 PROCEDURE — 93798 PHYS/QHP OP CAR RHAB W/ECG: CPT

## 2020-10-29 ENCOUNTER — CLINICAL SUPPORT (OUTPATIENT)
Dept: CARDIAC REHAB | Facility: CLINIC | Age: 65
End: 2020-10-29
Payer: COMMERCIAL

## 2020-10-29 DIAGNOSIS — Z95.1 S/P CABG (CORONARY ARTERY BYPASS GRAFT): ICD-10-CM

## 2020-10-29 PROCEDURE — 93798 PHYS/QHP OP CAR RHAB W/ECG: CPT

## 2020-10-30 ENCOUNTER — CLINICAL SUPPORT (OUTPATIENT)
Dept: CARDIAC REHAB | Facility: CLINIC | Age: 65
End: 2020-10-30
Payer: COMMERCIAL

## 2020-10-30 DIAGNOSIS — Z95.1 S/P CABG (CORONARY ARTERY BYPASS GRAFT): ICD-10-CM

## 2020-10-30 PROCEDURE — 93798 PHYS/QHP OP CAR RHAB W/ECG: CPT

## 2020-11-03 ENCOUNTER — APPOINTMENT (OUTPATIENT)
Dept: CARDIAC REHAB | Facility: CLINIC | Age: 65
End: 2020-11-03
Payer: COMMERCIAL

## 2020-11-05 ENCOUNTER — APPOINTMENT (OUTPATIENT)
Dept: CARDIAC REHAB | Facility: CLINIC | Age: 65
End: 2020-11-05
Payer: COMMERCIAL

## 2020-11-10 ENCOUNTER — CLINICAL SUPPORT (OUTPATIENT)
Dept: CARDIAC REHAB | Facility: CLINIC | Age: 65
End: 2020-11-10
Payer: COMMERCIAL

## 2020-11-10 DIAGNOSIS — Z95.1 S/P CABG (CORONARY ARTERY BYPASS GRAFT): ICD-10-CM

## 2020-11-10 PROCEDURE — 93798 PHYS/QHP OP CAR RHAB W/ECG: CPT

## 2020-11-12 ENCOUNTER — CLINICAL SUPPORT (OUTPATIENT)
Dept: CARDIAC REHAB | Facility: CLINIC | Age: 65
End: 2020-11-12
Payer: COMMERCIAL

## 2020-11-12 DIAGNOSIS — Z95.1 S/P CABG (CORONARY ARTERY BYPASS GRAFT): ICD-10-CM

## 2020-11-12 PROCEDURE — 93798 PHYS/QHP OP CAR RHAB W/ECG: CPT

## 2020-11-13 ENCOUNTER — CLINICAL SUPPORT (OUTPATIENT)
Dept: CARDIAC REHAB | Facility: CLINIC | Age: 65
End: 2020-11-13
Payer: COMMERCIAL

## 2020-11-13 DIAGNOSIS — Z95.1 S/P CABG (CORONARY ARTERY BYPASS GRAFT): ICD-10-CM

## 2020-11-13 PROCEDURE — 93798 PHYS/QHP OP CAR RHAB W/ECG: CPT

## 2020-11-17 ENCOUNTER — APPOINTMENT (OUTPATIENT)
Dept: CARDIAC REHAB | Facility: CLINIC | Age: 65
End: 2020-11-17
Payer: COMMERCIAL

## 2020-11-19 ENCOUNTER — CLINICAL SUPPORT (OUTPATIENT)
Dept: CARDIAC REHAB | Facility: CLINIC | Age: 65
End: 2020-11-19
Payer: COMMERCIAL

## 2020-11-19 DIAGNOSIS — Z95.1 S/P CABG (CORONARY ARTERY BYPASS GRAFT): ICD-10-CM

## 2020-11-19 PROCEDURE — 93798 PHYS/QHP OP CAR RHAB W/ECG: CPT

## 2020-11-20 ENCOUNTER — CLINICAL SUPPORT (OUTPATIENT)
Dept: CARDIAC REHAB | Facility: CLINIC | Age: 65
End: 2020-11-20
Payer: COMMERCIAL

## 2020-11-20 DIAGNOSIS — Z95.1 S/P CABG (CORONARY ARTERY BYPASS GRAFT): ICD-10-CM

## 2020-11-20 PROCEDURE — 93798 PHYS/QHP OP CAR RHAB W/ECG: CPT

## 2020-11-23 ENCOUNTER — HOSPITAL ENCOUNTER (OUTPATIENT)
Dept: NON INVASIVE DIAGNOSTICS | Facility: HOSPITAL | Age: 65
Discharge: HOME | End: 2020-11-23
Payer: COMMERCIAL

## 2020-11-23 DIAGNOSIS — I21.4 NSTEMI (NON-ST ELEVATED MYOCARDIAL INFARCTION) (HCC): ICD-10-CM

## 2020-11-23 DIAGNOSIS — I35.0 NONRHEUMATIC AORTIC VALVE STENOSIS: ICD-10-CM

## 2020-11-23 DIAGNOSIS — I48.91 POSTOPERATIVE ATRIAL FIBRILLATION (HCC): ICD-10-CM

## 2020-11-23 DIAGNOSIS — I97.89 POSTOPERATIVE ATRIAL FIBRILLATION (HCC): ICD-10-CM

## 2020-11-23 PROCEDURE — 93308 TTE F-UP OR LMTD: CPT | Performed by: INTERNAL MEDICINE

## 2020-11-23 PROCEDURE — 93325 DOPPLER ECHO COLOR FLOW MAPG: CPT | Performed by: INTERNAL MEDICINE

## 2020-11-23 PROCEDURE — 93308 TTE F-UP OR LMTD: CPT

## 2020-11-23 PROCEDURE — 93321 DOPPLER ECHO F-UP/LMTD STD: CPT | Performed by: INTERNAL MEDICINE

## 2020-11-24 ENCOUNTER — CLINICAL SUPPORT (OUTPATIENT)
Dept: CARDIAC REHAB | Facility: CLINIC | Age: 65
End: 2020-11-24
Payer: COMMERCIAL

## 2020-11-24 DIAGNOSIS — Z95.1 S/P CABG (CORONARY ARTERY BYPASS GRAFT): ICD-10-CM

## 2020-11-24 PROCEDURE — 93798 PHYS/QHP OP CAR RHAB W/ECG: CPT

## 2020-11-25 ENCOUNTER — CLINICAL SUPPORT (OUTPATIENT)
Dept: CARDIAC REHAB | Facility: CLINIC | Age: 65
End: 2020-11-25
Payer: COMMERCIAL

## 2020-11-25 DIAGNOSIS — Z95.1 S/P CABG (CORONARY ARTERY BYPASS GRAFT): ICD-10-CM

## 2020-11-25 PROCEDURE — 93798 PHYS/QHP OP CAR RHAB W/ECG: CPT

## 2020-11-27 ENCOUNTER — APPOINTMENT (OUTPATIENT)
Dept: CARDIAC REHAB | Facility: CLINIC | Age: 65
End: 2020-11-27
Payer: COMMERCIAL

## 2020-11-27 DIAGNOSIS — I25.10 CORONARY ARTERY DISEASE INVOLVING NATIVE CORONARY ARTERY OF NATIVE HEART WITHOUT ANGINA PECTORIS: ICD-10-CM

## 2020-11-27 DIAGNOSIS — Z95.2 S/P AVR (AORTIC VALVE REPLACEMENT): ICD-10-CM

## 2020-11-27 DIAGNOSIS — Z95.1 S/P CABG (CORONARY ARTERY BYPASS GRAFT): ICD-10-CM

## 2020-11-27 DIAGNOSIS — I35.0 NONRHEUMATIC AORTIC VALVE STENOSIS: ICD-10-CM

## 2020-11-27 RX ORDER — ATORVASTATIN CALCIUM 80 MG/1
TABLET, FILM COATED ORAL
Qty: 30 TABLET | Refills: 2 | Status: SHIPPED | OUTPATIENT
Start: 2020-11-27 | End: 2020-11-30

## 2020-11-30 RX ORDER — ATORVASTATIN CALCIUM 80 MG/1
80 TABLET, FILM COATED ORAL
Qty: 90 TABLET | Refills: 3 | Status: SHIPPED | OUTPATIENT
Start: 2020-11-30 | End: 2022-01-03

## 2020-12-01 ENCOUNTER — APPOINTMENT (OUTPATIENT)
Dept: CARDIAC REHAB | Facility: CLINIC | Age: 65
End: 2020-12-01
Payer: COMMERCIAL

## 2020-12-03 ENCOUNTER — CLINICAL SUPPORT (OUTPATIENT)
Dept: CARDIAC REHAB | Facility: CLINIC | Age: 65
End: 2020-12-03
Payer: COMMERCIAL

## 2020-12-03 DIAGNOSIS — Z95.1 S/P CABG (CORONARY ARTERY BYPASS GRAFT): ICD-10-CM

## 2020-12-03 PROCEDURE — 93798 PHYS/QHP OP CAR RHAB W/ECG: CPT

## 2020-12-04 ENCOUNTER — CLINICAL SUPPORT (OUTPATIENT)
Dept: CARDIAC REHAB | Facility: CLINIC | Age: 65
End: 2020-12-04
Payer: COMMERCIAL

## 2020-12-04 DIAGNOSIS — Z95.1 S/P CABG (CORONARY ARTERY BYPASS GRAFT): ICD-10-CM

## 2020-12-04 PROCEDURE — 93798 PHYS/QHP OP CAR RHAB W/ECG: CPT

## 2020-12-08 ENCOUNTER — CLINICAL SUPPORT (OUTPATIENT)
Dept: CARDIAC REHAB | Facility: CLINIC | Age: 65
End: 2020-12-08
Payer: COMMERCIAL

## 2020-12-08 DIAGNOSIS — Z95.1 S/P CABG (CORONARY ARTERY BYPASS GRAFT): ICD-10-CM

## 2020-12-08 PROCEDURE — 93798 PHYS/QHP OP CAR RHAB W/ECG: CPT

## 2020-12-10 ENCOUNTER — CLINICAL SUPPORT (OUTPATIENT)
Dept: CARDIAC REHAB | Facility: CLINIC | Age: 65
End: 2020-12-10
Payer: COMMERCIAL

## 2020-12-10 DIAGNOSIS — Z95.1 S/P CABG (CORONARY ARTERY BYPASS GRAFT): ICD-10-CM

## 2020-12-10 PROCEDURE — 93798 PHYS/QHP OP CAR RHAB W/ECG: CPT

## 2020-12-11 ENCOUNTER — CLINICAL SUPPORT (OUTPATIENT)
Dept: CARDIAC REHAB | Facility: CLINIC | Age: 65
End: 2020-12-11
Payer: COMMERCIAL

## 2020-12-11 DIAGNOSIS — Z95.1 S/P CABG (CORONARY ARTERY BYPASS GRAFT): ICD-10-CM

## 2020-12-11 PROCEDURE — 93798 PHYS/QHP OP CAR RHAB W/ECG: CPT

## 2020-12-15 ENCOUNTER — CLINICAL SUPPORT (OUTPATIENT)
Dept: CARDIAC REHAB | Facility: CLINIC | Age: 65
End: 2020-12-15
Payer: COMMERCIAL

## 2020-12-15 DIAGNOSIS — Z95.1 S/P CABG (CORONARY ARTERY BYPASS GRAFT): ICD-10-CM

## 2020-12-15 PROCEDURE — 93798 PHYS/QHP OP CAR RHAB W/ECG: CPT

## 2020-12-17 ENCOUNTER — APPOINTMENT (OUTPATIENT)
Dept: CARDIAC REHAB | Facility: CLINIC | Age: 65
End: 2020-12-17
Payer: COMMERCIAL

## 2020-12-18 ENCOUNTER — CLINICAL SUPPORT (OUTPATIENT)
Dept: CARDIAC REHAB | Facility: CLINIC | Age: 65
End: 2020-12-18
Payer: COMMERCIAL

## 2020-12-18 DIAGNOSIS — Z95.1 S/P CABG (CORONARY ARTERY BYPASS GRAFT): ICD-10-CM

## 2020-12-18 PROCEDURE — 93798 PHYS/QHP OP CAR RHAB W/ECG: CPT

## 2020-12-21 ENCOUNTER — APPOINTMENT (OUTPATIENT)
Dept: CARDIAC REHAB | Facility: CLINIC | Age: 65
End: 2020-12-21
Payer: COMMERCIAL

## 2020-12-21 DIAGNOSIS — Z95.2 S/P AVR (AORTIC VALVE REPLACEMENT): ICD-10-CM

## 2020-12-21 DIAGNOSIS — I25.10 CORONARY ARTERY DISEASE INVOLVING NATIVE CORONARY ARTERY OF NATIVE HEART WITHOUT ANGINA PECTORIS: ICD-10-CM

## 2020-12-21 DIAGNOSIS — I35.0 NONRHEUMATIC AORTIC VALVE STENOSIS: ICD-10-CM

## 2020-12-21 DIAGNOSIS — Z95.1 S/P CABG (CORONARY ARTERY BYPASS GRAFT): ICD-10-CM

## 2020-12-21 RX ORDER — METOPROLOL TARTRATE 50 MG/1
50 TABLET, FILM COATED ORAL EVERY 12 HOURS SCHEDULED
Qty: 180 TABLET | Refills: 3 | Status: SHIPPED | OUTPATIENT
Start: 2020-12-21 | End: 2022-01-19

## 2020-12-22 ENCOUNTER — APPOINTMENT (OUTPATIENT)
Dept: CARDIAC REHAB | Facility: CLINIC | Age: 65
End: 2020-12-22
Payer: COMMERCIAL

## 2020-12-24 ENCOUNTER — TELEPHONE (OUTPATIENT)
Dept: CARDIOLOGY CLINIC | Facility: CLINIC | Age: 65
End: 2020-12-24

## 2020-12-24 ENCOUNTER — APPOINTMENT (OUTPATIENT)
Dept: CARDIAC REHAB | Facility: CLINIC | Age: 65
End: 2020-12-24
Payer: COMMERCIAL

## 2020-12-24 NOTE — TELEPHONE ENCOUNTER
Pt called and would like to know if he able to get the Covid vaccine   Pt would like a call back at [phone]

## 2020-12-28 ENCOUNTER — CLINICAL SUPPORT (OUTPATIENT)
Dept: CARDIAC REHAB | Facility: CLINIC | Age: 65
End: 2020-12-28
Payer: COMMERCIAL

## 2020-12-28 DIAGNOSIS — Z95.1 S/P CABG (CORONARY ARTERY BYPASS GRAFT): ICD-10-CM

## 2020-12-28 PROCEDURE — 93798 PHYS/QHP OP CAR RHAB W/ECG: CPT

## 2020-12-29 ENCOUNTER — CLINICAL SUPPORT (OUTPATIENT)
Dept: CARDIAC REHAB | Facility: CLINIC | Age: 65
End: 2020-12-29
Payer: COMMERCIAL

## 2020-12-29 DIAGNOSIS — Z95.1 S/P CABG (CORONARY ARTERY BYPASS GRAFT): ICD-10-CM

## 2020-12-29 PROCEDURE — 93798 PHYS/QHP OP CAR RHAB W/ECG: CPT

## 2020-12-31 ENCOUNTER — CLINICAL SUPPORT (OUTPATIENT)
Dept: CARDIAC REHAB | Facility: CLINIC | Age: 65
End: 2020-12-31
Payer: COMMERCIAL

## 2020-12-31 DIAGNOSIS — Z95.1 S/P CABG (CORONARY ARTERY BYPASS GRAFT): ICD-10-CM

## 2020-12-31 PROCEDURE — 93798 PHYS/QHP OP CAR RHAB W/ECG: CPT

## 2021-01-05 ENCOUNTER — CLINICAL SUPPORT (OUTPATIENT)
Dept: CARDIAC REHAB | Facility: CLINIC | Age: 66
End: 2021-01-05
Payer: COMMERCIAL

## 2021-01-05 DIAGNOSIS — Z95.1 S/P CABG (CORONARY ARTERY BYPASS GRAFT): ICD-10-CM

## 2021-01-05 PROCEDURE — 93798 PHYS/QHP OP CAR RHAB W/ECG: CPT

## 2021-01-07 ENCOUNTER — CLINICAL SUPPORT (OUTPATIENT)
Dept: CARDIAC REHAB | Facility: CLINIC | Age: 66
End: 2021-01-07
Payer: COMMERCIAL

## 2021-01-07 DIAGNOSIS — Z95.1 S/P CABG (CORONARY ARTERY BYPASS GRAFT): ICD-10-CM

## 2021-01-07 PROCEDURE — 93798 PHYS/QHP OP CAR RHAB W/ECG: CPT

## 2021-01-08 ENCOUNTER — APPOINTMENT (OUTPATIENT)
Dept: CARDIAC REHAB | Facility: CLINIC | Age: 66
End: 2021-01-08
Payer: COMMERCIAL

## 2021-01-12 ENCOUNTER — CLINICAL SUPPORT (OUTPATIENT)
Dept: CARDIAC REHAB | Facility: CLINIC | Age: 66
End: 2021-01-12
Payer: COMMERCIAL

## 2021-01-12 DIAGNOSIS — Z95.1 S/P CABG (CORONARY ARTERY BYPASS GRAFT): ICD-10-CM

## 2021-01-12 PROCEDURE — 93798 PHYS/QHP OP CAR RHAB W/ECG: CPT

## 2021-01-12 NOTE — PROGRESS NOTES
Cardiac Rehabilitation Plan of Care   Discharge       Today's date: 2021   # of Exercise Sessions Completed: 36 - exercise session labeled 37 due to incorrect monitor numbering   Patient name: Estuardo Martinez      : 1955  Age: 72 y o  MRN: [id number]  Referring Physician: Marisela Childs PA-C  Cardiologist: Dayanna Hendrickson MD  Provider: [facility] [address]  Clinician: Peg Carmona, MS, [facility], [facility]    Dx:   Encounter Diagnosis   Name Primary?  S/P CABG (coronary artery bypass graft)      Date of onset: 2020      SUMMARY OF PROGRESS:  Discharge note for Storm East today  He had 141% improvement in functional capacity increasing His max METs in the submaximal TM ETT from 3 1 to 7 5 with test termination of RHR +30  He had a 64% improvement in the DUKE activity estimated MET level with ADLs and physical activity  His [facility] QOL improved by 4 5%  PHQ-9 score decreased from 7 to 3  His weight increased by 10 inches  Waist circumference increased by 1 inch  Rate Your Plate score improved from 45 to 54  Storm East has not been supplementing CR sessions with a home walking program but plans to start  He reports increased stamina, strength and reduced SOB with any activity  Vasile tolerates 40-50 mins at 4 2 - 4 7 METs plus wt training  NSR betty on telemetry at rest and no ectopy  RHR 50 - 60, ExHR 80 - 100  Resting /60 - 140/62 with appropriate response to exercise reaching 122/62 - 160/60  All group education classes on cardiac risk factor modification were attended by the patient  Discharge plans include joining [employer] to maintain current physical health and progress on own  Encouraged Pt to continue exercise  Frequency: 4-6 days/wk, Intensity: RPE 4-5, Time: 40-50 mins daily, 150-200 mins/wk, Type: home walking, biking, treadmill use, and elliptical  Pt was encouraged to continue eating heart healthy and try to lose the weight he put on   He is down 10 lbs since hospital D/C  Pt was encouraged to remain complaint with medications and f/u with cardiologist with any cardiac symptoms, medication management and updated lipid profile         Medication compliance: Yes   Comments: None   Fall Risk: Low   Comments: None     EKG changes: NSR with TWI and nonspecific ST depression       EXERCISE ASSESSMENT and PLAN    Current Exercise Program in Rehab:       Frequency: 3 days/week Supplement with home exercise 2+ days/wk as tolerated       Minutes: 40-45        METS: 3 5-4 7            HR:    RPE: 3-5         Modalities: Treadmill, Lifecycle and Elliptical      Strength trainin-3 days / week  12-15 repetitions  1-2 sets per modality    Modalities: Leg Press, Chest Press, Pull Downs, Arm Curl and Seated Row    Progressing:  Yes - has increased duration and intensity     Home Exercise: None    Goals: improved DASI score by 10%, Increase in exercise capacity by 40% - based on peak METs tolerated in cardiac rehab exercise session, Exercise 5 days/wk, >150mins/wk of moderate intensity exercise, Resume ADLs with increased strength, Return to work unrestricted and Start a walking program  Education: home exercise guidelines, AHA guidelines to achieve >150 mins/wk of moderate exercise, RPE scale, class: Risk Factors for Heart Disease and exercise instructions/guidelines for discharge    Plan:education on home exercise guidelines and home exercise 30+ mins 2 days opposite CR  Readiness to change: Maintenance: (Maintaining the behavior change)      NUTRITION ASSESSMENT AND PLAN    Weight control:    Starting weight: 184 5 lbs    Current weight:   194 lbs   Waist circumference:    Startinin    Current:  39 in  Diabetes: N/A  Lipid management: Discussed diet and lipid management and Last lipid profile 2020  Chol 205    HDL 39    Goals:reduced BMI to < 25, LDL <100, HDL >40, TRG <150, CHOL <200, decreased body fat% <25%   , reduced waist circumference <35 inches, Improved Rate Your Plate score  >48 and 2 5-5%  wt loss  Education: heart healthy eating  low sodium diet  hydration  diet and lipid management  diabetes management and exercise  wt  loss  healthy choices while dining out  portion control  Progressing: Yes - planning on getting back on a healthier diet   Plan: Education class: Reading Food Labels, Education Class: Heart Healthy Eating, switch to low fat cheeses, replace butter with soft spreads made with olive oil, canola or yogurt, replace refined grain bread with whole grain bread, replace unhealthy snacks with fruits & vegs, reduce portion sizes, reduce red meat 1x/wk, switch to skim or 1% milk, eat fewer desserts and sweets, avoid processed foods and remove salt shaker from table  Readiness to change: Action:  (Changing behavior)      PSYCHOSOCIAL ASSESSMENT AND PLAN    Emotional:  Depression assessment:  PHQ-9 = 1-4 = Minimal Depression            Anxiety measure:  ALLAN-7 = 0-4  = Not anxious  Self-reported stress level:  3  Social support: Excellent  Goals:  Reduce perceived stress to 1-3/10, Physical Fitness in [facility] Score < 3, Social Support in [facility] Score < 3, Social Activities in [facility] Score < 3, Overall Health in [facility] Score < 3, Change in Health in [place] Score < 3 , improved sleep, improved concentration, improved positive thoughts of well being, increased energy and take time to relax  Education: signs/sxs of depression, benefits of a positive support system, class:  Stress and Your Health  and class:  Relaxation  Progressing: maintaining - reports to have stress under control   Plan: PHQ-9 >5 will refer to MD, Refer to [employer] [facility] relaxation techniques  Readiness to change: Action:  (Changing behavior)      OTHER CORE COMPONENTS     Tobacco:   Social History     Tobacco Use   Smoking Status Never Smoker   Smokeless Tobacco Current User    Types: Snuff       Tobacco Use Intervention:   N/A: Pt has a remote history of smoking  Brief counseling by cardiac rehab professional  Date: 2020    Blood pressure:    Restin/60 - 140/62   Exercise: 122/62 - 160/60      Goals: consistent BP < 130/80, reduced dietary sodium <2300mg, moderate intensity exercise >150 mins/wk and medication compliance  Education:  understanding high blood pressure and it's relationship to CAD, low sodium diet and HTN, Education class:  Common Heart Medications and Education class: Understanding Heart Disease  Progressing: Yes - maintain   Plan: reduce number of cigarettes per day and monitor home BP  Readiness to change: Action:  (Changing behavior)

## 2021-02-25 ENCOUNTER — TELEPHONE (OUTPATIENT)
Dept: CARDIOLOGY CLINIC | Facility: CLINIC | Age: 66
End: 2021-02-25

## 2021-02-25 NOTE — TELEPHONE ENCOUNTER
Good afternoon,    [id number] denied for no referral; referral in system still shows as [address] [place] Hugo Lopes MD    Not sure if you need anything further from me to reach out to the office  Please let me know if I need to do anything else  Thank you,  Izzy Blum      Good Afternoon,    Please see below email from A/R Specialist   Referral is still pending  Please review and advise if this referral will be completed to cover this date of service  Thanking you in advance  Larissa Castorena      SPOKE TO [id number] & SHE SAID THAT SHE IS GOING TO RE-PROCESS THE CLAIMS FROM 9/08 & 9/28 & ATTACH THE REFERRAL FROM DR SANTA TO THOSE CLAIMS   THE REF # OF THE CALL IS [id number]  PT ALSO HAS AN APPT W/ DR Sydni Caro ON 4/13/21 & SHE SAID THAT THE SAME REFERRAL WILL BE GOOD FOR THAT DOS B/C THE REFERRAL EXP ON 8/24/21

## 2021-04-12 ENCOUNTER — TELEPHONE (OUTPATIENT)
Dept: CARDIOLOGY CLINIC | Facility: CLINIC | Age: 66
End: 2021-04-12

## 2021-04-12 NOTE — TELEPHONE ENCOUNTER
If patient is asymptomatic and there is no restriction from our clinic standpoint, I will be happy to see him

## 2021-04-12 NOTE — TELEPHONE ENCOUNTER
Patient has an appt with Dr Alaina Khoury tomorrow  Patient works in Occupational therapy and his wife is in [facility] and was diagnosed with [place] last Thursday  He did see her last week  His daughter had COVID three weeks ago  He states that he does get tested every Tuesday and so far it is negative  He is not experiencing any symptoms  Can he still be seen tomorrow or do we have to reschedule      David Tomasz - [phone]

## 2021-04-13 ENCOUNTER — OFFICE VISIT (OUTPATIENT)
Dept: CARDIOLOGY CLINIC | Facility: CLINIC | Age: 66
End: 2021-04-13
Payer: COMMERCIAL

## 2021-04-13 VITALS
DIASTOLIC BLOOD PRESSURE: 80 MMHG | HEIGHT: 63 IN | WEIGHT: 199 LBS | HEART RATE: 51 BPM | BODY MASS INDEX: 35.26 KG/M2 | OXYGEN SATURATION: 95 % | SYSTOLIC BLOOD PRESSURE: 148 MMHG

## 2021-04-13 DIAGNOSIS — Z95.1 HX OF CABG: ICD-10-CM

## 2021-04-13 DIAGNOSIS — Z95.2 H/O AORTIC VALVE REPLACEMENT: ICD-10-CM

## 2021-04-13 DIAGNOSIS — E78.2 MIXED HYPERLIPIDEMIA: ICD-10-CM

## 2021-04-13 DIAGNOSIS — I10 ESSENTIAL HYPERTENSION: ICD-10-CM

## 2021-04-13 DIAGNOSIS — I25.10 ATHEROSCLEROSIS OF NATIVE CORONARY ARTERY OF NATIVE HEART WITHOUT ANGINA PECTORIS: Primary | ICD-10-CM

## 2021-04-13 PROCEDURE — 99214 OFFICE O/P EST MOD 30 MIN: CPT | Performed by: INTERNAL MEDICINE

## 2021-04-13 RX ORDER — LISINOPRIL 2.5 MG/1
2.5 TABLET ORAL DAILY
Qty: 90 TABLET | Refills: 3 | Status: SHIPPED | OUTPATIENT
Start: 2021-04-13 | End: 2022-03-17

## 2021-04-13 NOTE — PROGRESS NOTES
[facility] [address] [address]  Cardiology Follow Up    Isaias Sampson  1955  [id number]      1  Atherosclerosis of native coronary artery of native heart without angina pectoris     2  Hx of CABG     3  H/O aortic valve replacement     4  Essential hypertension  lisinopril (ZESTRIL) 2 5 mg tablet    Basic metabolic panel   5  Mixed hyperlipidemia     6  BMI 35 0-35 9,adult         Chief Complaint   Patient presents with    Follow-up     6 month follow up       Interval History:    79-year-old male with coronary artery disease status post one-vessel CABG SVG to OM2 at the of aortic valve replacement,  Severe aortic stenosis status post Lyndal Nadir recently a pericardial tissue valve on 08/19/2020, paroxysmal atrial fibrillation on Eliquis, hypertension, hyperlipidemia, obesity, contrast allergy presented for cardiology follow-up     patient is doing well since last cardiology clinic visit   Patient has underwent event monitor which showed paroxysmal Afib and he was started on Eliquis  He is tolerating Eliquis well without any black stool or blood in stool   Patient denies chest pain, shortness of breath, palpitation, dizziness, orthopnea, leg edema or loss of consciousness   No chest pain or shortness of breath on exertion   Patient had gained about 15-20 lb since last clinic visit as he is not going out much due to COVID-19 situation     Labs from November 2020 reviewed LDL 51, creatinine AST within normal limit   Patient does not monitor blood pressure at home     Current medications reviewed with the patient   Blood pressure is high in the clinic today     ( from initial clinic visit note - Patient was recently admitted in mid August 2020 at [facility] with complaints of chest pain  He ruled in for non-STEMI  Echocardiogram showed preserved LVEF with moderate to severe LVH and severe aortic stenosis    Cardiac catheterization showed 100% occluded mid circ with right to left collateral, proximal LAD 50% stenosis status post negative IFR 0 96, focal mid LAD stenosis with positive IFR of 0 88 with brisk flow distally, luminal irregularities in RCA  He underwent single vessel CABG SVG to OM 2 and aortic wall replacement with 21 mm Morrison resilia pericardial tissue valve on 08/19/2020)    Review of Systems:   All review of system negative except as mentioned above    Patient Active Problem List   Diagnosis    NSTEMI (non-ST elevated myocardial infarction) ([facility] [address] )    Accelerated hypertension    Elevated brain natriuretic peptide (BNP) level    Allergic reaction to contrast dye    Chest pain    Leukocytosis    Aortic stenosis    HTN (hypertension)    HLD (hyperlipidemia)    CAD (coronary artery disease)    S/P CABG (coronary artery bypass graft)    S/P AVR (aortic valve replacement)    Thrombocytopenia (HCC)    Hypocalcemia    Irritation of right eye    Postoperative atrial fibrillation (HCC)     Past Medical History:   Diagnosis Date    HLD (hyperlipidemia)     HTN (hypertension)      Social History     Socioeconomic History    Marital status: /Civil Union     Spouse name: Not on file    Number of children: Not on file    Years of education: Not on file    Highest education level: Not on file   Occupational History    Occupation: occupational therapist     Employer: [address] [employer]    Financial resource strain: Not on file    Food insecurity     Worry: Not on file     Inability: Not on file    Transportation needs     Medical: Not on file     Non-medical: Not on file   Tobacco Use    Smoking status: Never Smoker    Smokeless tobacco: Current User     Types: Snuff   Substance and Sexual Activity    Alcohol use: Not Currently    Drug use: Not Currently    Sexual activity: Not Currently   Lifestyle    Physical activity     Days per week: Not on file     Minutes per session: Not on file    Stress: Not on file   Relationships    Social connections     Talks on phone: Not on file     Gets together: Not on file     Attends Cheondoism service: Not on file     Active member of club or organization: Not on file     Attends meetings of clubs or organizations: Not on file     Relationship status: Not on file    Intimate partner violence     Fear of current or ex partner: Not on file     Emotionally abused: Not on file     Physically abused: Not on file     Forced sexual activity: Not on file   Other Topics Concern    Not on file   Social History Narrative    Not on file      History reviewed  No pertinent family history    Past Surgical History:   Procedure Laterality Date    CORONARY ARTERY BYPASS GRAFT WITH VALVE REPLACEMENT Left 8/19/2020    Procedure: CORONARY ARTERY BYPASS GRAFT (CABG) x 1; Left EVH/SVG to OM2; AVR with Morrison 21 mm Inspiris Tissue Valve;  Surgeon: Lynn Levi MD;  [facility];  Service: Cardiac Surgery    KNEE ARTHROSCOPY Left     ROTATOR CUFF REPAIR Left        Current Outpatient Medications:     apixaban (ELIQUIS) 5 mg, Take 1 tablet (5 mg total) by mouth 2 (two) times a day, Disp: 180 tablet, Rfl: 3    aspirin (ECOTRIN LOW STRENGTH) 81 mg EC tablet, Take 1 tablet (81 mg total) by mouth daily, Disp: 90 tablet, Rfl: 3    atorvastatin (LIPITOR) 80 mg tablet, Take 1 tablet (80 mg total) by mouth daily with dinner, Disp: 90 tablet, Rfl: 3    metoprolol tartrate (LOPRESSOR) 50 mg tablet, Take 1 tablet (50 mg total) by mouth every 12 (twelve) hours, Disp: 180 tablet, Rfl: 3    docusate sodium (COLACE) 100 mg capsule, Take 1 capsule (100 mg total) by mouth 2 (two) times a day (Patient not taking: Reported on 4/13/2021), Disp: 60 capsule, Rfl: 0    lisinopril (ZESTRIL) 2 5 mg tablet, Take 1 tablet (2 5 mg total) by mouth daily, Disp: 90 tablet, Rfl: 3    omeprazole (PriLOSEC) 20 mg delayed release capsule, Take 1 capsule (20 mg total) by mouth daily (Patient not taking: Reported on 4/13/2021), Disp: 30 capsule, Rfl: 0    polyethylene glycol (MIRALAX) 17 g packet, Take 17 g by mouth daily as needed (for constipation) (Patient not taking: Reported on 9/8/2020), Disp: 14 each, Rfl: 0    potassium chloride (K-DUR,KLOR-CON) 20 mEq tablet, Take 1 tablet (20 mEq total) by mouth daily for 7 days (Patient not taking: Reported on 4/13/2021), Disp: 7 tablet, Rfl: 1    torsemide (DEMADEX) 20 mg tablet, Take 1 tablet (20 mg total) by mouth daily for 7 days (Patient not taking: Reported on 4/13/2021), Disp: 7 tablet, Rfl: 1  Allergies   Allergen Reactions    Contrast Dye [Iodinated Diagnostic Agents] Anaphylaxis       Labs:  No visits with results within 6 Month(s) from this visit  Latest known visit with results is:   No results displayed because visit has over 200 results  Imaging: No results found  Physical Exam:  General:   obese, awake, alert and oriented x3, not in distress  Neck: supple, no JVD  Eyes: PERRL, conjunctiva normal  Lungs:  Bilateral air entry positive, no wheeze/rhonchi or crackle  Heart:  S1-S2 normal,  Mild systolic murmur in aortic  Abdomen:  Soft ,nondistended ,nontender, bowel sounds positive  Extremities:  No leg edema, no deformity, ROM normal  Neuro:  Moving all extremities, speech clear  Skin: warm, no rash     /80 (BP Location: Left arm, Patient Position: Sitting, Cuff Size: Standard)   Pulse (!) 51   Ht 5' 3" (1 6 m)   Wt 90 3 kg (199 lb)   SpO2 95%   BMI 35 25 kg/m²     Cardiographics :   limited echo in November 2020 showed LVEF more than 55%, moderate concentric LVH, mildly dilated LA, normally functioning  Tissue aortic valve with mean pressure gradient of 11 and peak velocity of 2 6 m/sec, mild MR, mild TR      Assessment:    1  Coronary artery disease   status post single-vessel CABG SVG to OM2 in August 2020   focal mid LAD stenosis with positive IFR - 0 88   patient denies any anginal symptoms at present time    2  Severe aortic stenosis status post tissue valve replacement - 21 mm Morrison resilia pericardial tissue valve on 08/19/2020    3  Paroxysmal atrial fibrillation  Patient was in Afib postop but less than 48 hour and anti coagulant was stopped  He underwent event monitoring which showed paroxysmal Afib and was restarted on Eliquis  Currently in sinus rhythm    4  Hypertension  Not well controlled  5  Hyperlipidemia  LDL at goal in November 2020 -  51  6  BMI 35  7  Contrast allergy    Recommendations:   patient is doing okay from cardiology standpoint at present time   Patient to continue aspirin, Eliquis, metoprolol tartrate 50 mg daily and Lipitor 80 mg      patient advised to monitor blood pressure at home and if it is not well controlled and stays more than 130/80 then to start low-dose ACE-inhibitor 2 5 mg daily  Will get repeat BMP if patient starts to take ACE inhibitor after 2 weeks of starting     patient advised to take low-salt, low-fat/low-cholesterol diet and try to lose weight     return to clinic in 6 months or early if needed  Above all discussed with patient    Patient understands and agrees

## 2021-10-26 DIAGNOSIS — I48.0 PAROXYSMAL ATRIAL FIBRILLATION (HCC): ICD-10-CM

## 2021-10-27 RX ORDER — APIXABAN 5 MG/1
TABLET, FILM COATED ORAL
Qty: 180 TABLET | Refills: 3 | Status: SHIPPED | OUTPATIENT
Start: 2021-10-27 | End: 2021-10-29 | Stop reason: SDUPTHER

## 2021-10-28 ENCOUNTER — TELEPHONE (OUTPATIENT)
Dept: CARDIOLOGY CLINIC | Facility: CLINIC | Age: 66
End: 2021-10-28

## 2021-12-08 ENCOUNTER — TELEPHONE (OUTPATIENT)
Dept: CARDIOLOGY CLINIC | Facility: CLINIC | Age: 66
End: 2021-12-08

## 2021-12-31 DIAGNOSIS — I35.0 NONRHEUMATIC AORTIC VALVE STENOSIS: ICD-10-CM

## 2021-12-31 DIAGNOSIS — Z95.2 S/P AVR (AORTIC VALVE REPLACEMENT): ICD-10-CM

## 2021-12-31 DIAGNOSIS — I25.10 CORONARY ARTERY DISEASE INVOLVING NATIVE CORONARY ARTERY OF NATIVE HEART WITHOUT ANGINA PECTORIS: ICD-10-CM

## 2021-12-31 DIAGNOSIS — Z95.1 S/P CABG (CORONARY ARTERY BYPASS GRAFT): ICD-10-CM

## 2022-01-03 RX ORDER — ATORVASTATIN CALCIUM 80 MG/1
TABLET, FILM COATED ORAL
Qty: 90 TABLET | Refills: 3 | Status: SHIPPED | OUTPATIENT
Start: 2022-01-03

## 2022-01-10 ENCOUNTER — TELEPHONE (OUTPATIENT)
Dept: CARDIOLOGY CLINIC | Facility: CLINIC | Age: 67
End: 2022-01-10

## 2022-01-10 NOTE — TELEPHONE ENCOUNTER
Fax received from [employer] for alternative(Warfarin and Xarelto) medication request  Pt was prescribed Eliquis and medication is not on insurance formulary  Contacted [facility] for prior authorization and it was denied  Submitted clinicals (PTT#[phone]) on 1/10/2022 and confirmation received

## 2022-01-12 NOTE — TELEPHONE ENCOUNTER
Fax received from [facility] for medication denial of Eliquis  Spoke with representative, [facility], she stated that request was denied because they want patient to try alternatives Xarelto or Warfarin  Denial forms scanned in chart

## 2022-01-18 DIAGNOSIS — Z95.2 S/P AVR (AORTIC VALVE REPLACEMENT): ICD-10-CM

## 2022-01-18 DIAGNOSIS — I25.10 CORONARY ARTERY DISEASE INVOLVING NATIVE CORONARY ARTERY OF NATIVE HEART WITHOUT ANGINA PECTORIS: ICD-10-CM

## 2022-01-18 DIAGNOSIS — I35.0 NONRHEUMATIC AORTIC VALVE STENOSIS: ICD-10-CM

## 2022-01-18 DIAGNOSIS — Z95.1 S/P CABG (CORONARY ARTERY BYPASS GRAFT): ICD-10-CM

## 2022-01-19 RX ORDER — METOPROLOL TARTRATE 50 MG/1
TABLET, FILM COATED ORAL
Qty: 180 TABLET | Refills: 3 | Status: SHIPPED | OUTPATIENT
Start: 2022-01-19 | End: 2022-04-05

## 2022-02-23 ENCOUNTER — TELEPHONE (OUTPATIENT)
Dept: CARDIOLOGY CLINIC | Facility: CLINIC | Age: 67
End: 2022-02-23

## 2022-02-23 NOTE — TELEPHONE ENCOUNTER
Name of Caller: Patient     Problem/Symptoms: Patient contacting office due to a change in his pharmacy  Moving forward all requests will be going through [facility] which I did update the pharmacy on his file  Also, patient refilling his Eliquis with a very reasonable co-pay however, that will be changing in August of this year  Patient will need financial assistance for the Eliquis       Call back number: [phone]    Last or Next appt:

## 2022-02-23 NOTE — TELEPHONE ENCOUNTER
S/w patient stated that no refills are needed at the moment  Also stated he is currently good on Eliquis and will contact the office when he is ready for assistance form

## 2022-04-05 ENCOUNTER — OFFICE VISIT (OUTPATIENT)
Dept: CARDIOLOGY CLINIC | Facility: CLINIC | Age: 67
End: 2022-04-05
Payer: COMMERCIAL

## 2022-04-05 VITALS
BODY MASS INDEX: 34.91 KG/M2 | WEIGHT: 197 LBS | HEART RATE: 44 BPM | RESPIRATION RATE: 16 BRPM | OXYGEN SATURATION: 97 % | SYSTOLIC BLOOD PRESSURE: 142 MMHG | DIASTOLIC BLOOD PRESSURE: 70 MMHG | HEIGHT: 63 IN

## 2022-04-05 DIAGNOSIS — I25.10 ATHEROSCLEROSIS OF NATIVE CORONARY ARTERY OF NATIVE HEART WITHOUT ANGINA PECTORIS: Primary | ICD-10-CM

## 2022-04-05 DIAGNOSIS — Z95.2 H/O AORTIC VALVE REPLACEMENT: ICD-10-CM

## 2022-04-05 DIAGNOSIS — I48.0 PAROXYSMAL A-FIB (HCC): ICD-10-CM

## 2022-04-05 DIAGNOSIS — I10 PRIMARY HYPERTENSION: ICD-10-CM

## 2022-04-05 DIAGNOSIS — E78.2 MIXED HYPERLIPIDEMIA: ICD-10-CM

## 2022-04-05 DIAGNOSIS — R00.1 BRADYCARDIA: ICD-10-CM

## 2022-04-05 PROCEDURE — 99214 OFFICE O/P EST MOD 30 MIN: CPT | Performed by: INTERNAL MEDICINE

## 2022-04-05 NOTE — PROGRESS NOTES
[facility]  [address] [address]  Cardiology Follow Up    Junior Pina  1955  [id number]      1  Atherosclerosis of native coronary artery of native heart without angina pectoris  metoprolol tartrate (LOPRESSOR) 25 mg tablet    Comprehensive metabolic panel    Lipid Panel with Direct LDL reflex   2  Bradycardia  Holter monitor   3  H/O aortic valve replacement     4  Paroxysmal A-fib (HCC)  CBC and differential   5  Mixed hyperlipidemia  Comprehensive metabolic panel    Lipid Panel with Direct LDL reflex   6  Primary hypertension         Chief Complaint   Patient presents with    Follow-up       Interval History:   59-year-old male with coronary artery status post 1 vessel CABG SVG to OM2 at the time of aortic valve replacement, severe aortic stenosis status post pericardial tissue advised wall on 08/19/2020, paroxysmal AFib on Eliquis, hypertension, hyperlipidemia, obesity, contrast allergy presented for follow-up    Since last clinic visit patient is doing well  He denies chest pain, shortness of breath, palpitation, dizziness, orthopnea, leg edema or loss of consciousness  He denies any shortness of breath or dizziness on exertion  No chest pain on exertion  Patient works for 2 jobs without any chest pain or shortness of breath  He does not monitor vitals at home  He is noted today in the clinic heart rate in high 40s to high 50s    No recent labs available for review  Current medications reviewed    ( from initial clinic visit note - Patient was recently admitted in mid August 2020 at [facility] with complaints of chest pain   He ruled in for non-STEMI    Echocardiogram showed preserved LVEF with moderate to severe LVH and severe aortic stenosis   Cardiac catheterization showed 100% occluded mid circ with right to left collateral, proximal LAD 50% stenosis status post negative IFR 0 96, focal mid LAD stenosis with positive IFR of 0 88 with brisk flow distally, luminal irregularities in RCA   He underwent single vessel CABG SVG to OM 2 and aortic wall replacement with 21 mm Morrison resilia pericardial tissue valve on 08/19/2020)    Review of Systems: All review of system negative except as mentioned above    Patient Active Problem List   Diagnosis    NSTEMI (non-ST elevated myocardial infarction) ([facility] [address] )    Accelerated hypertension    Elevated brain natriuretic peptide (BNP) level    Allergic reaction to contrast dye    Chest pain    Leukocytosis    Aortic stenosis    HTN (hypertension)    HLD (hyperlipidemia)    CAD (coronary artery disease)    S/P CABG (coronary artery bypass graft)    S/P AVR (aortic valve replacement)    Thrombocytopenia (HCC)    Hypocalcemia    Irritation of right eye    Postoperative atrial fibrillation (HCC)     Past Medical History:   Diagnosis Date    HLD (hyperlipidemia)     HTN (hypertension)      Social History     Socioeconomic History    Marital status: /Civil Union     Spouse name: Not on file    Number of children: Not on file    Years of education: Not on file    Highest education level: Not on file   Occupational History    Occupation: occupational therapist     Employer: [employer]   Tobacco Use    Smoking status: Never Smoker    Smokeless tobacco: Current User     Types: Snuff   Vaping Use    Vaping Use: Never used   Substance and Sexual Activity    Alcohol use: Not Currently    Drug use: Not Currently    Sexual activity: Not Currently   Other Topics Concern    Not on file   Social History Narrative    Not on file     Social Determinants of Health     Financial Resource Strain: Not on file   Food Insecurity: Not on file   Transportation Needs: Not on file   Physical Activity: Not on file   Stress: Not on file   Social Connections: Not on file   Intimate Partner Violence: Not on file   Housing Stability: Not on file      History reviewed   No pertinent family history  Past Surgical History:   Procedure Laterality Date    CORONARY ARTERY BYPASS GRAFT WITH VALVE REPLACEMENT Left 8/19/2020    Procedure: CORONARY ARTERY BYPASS GRAFT (CABG) x 1; Left EVH/SVG to OM2; AVR with Morrison 21 mm Inspiris Tissue Valve;  Surgeon: Vic Pennington MD;  [facility];  Service: Cardiac Surgery    KNEE ARTHROSCOPY Left     ROTATOR CUFF REPAIR Left        Current Outpatient Medications:     apixaban (Eliquis) 5 mg, Take 1 tablet (5 mg total) by mouth 2 (two) times a day, Disp: 180 tablet, Rfl: 3    aspirin (ECOTRIN LOW STRENGTH) 81 mg EC tablet, Take 1 tablet (81 mg total) by mouth daily, Disp: 90 tablet, Rfl: 3    atorvastatin (LIPITOR) 80 mg tablet, take 1 tablet by mouth once daily with dinner, Disp: 90 tablet, Rfl: 3    lisinopril (ZESTRIL) 2 5 mg tablet, Take 1 tablet (2 5 mg total) by mouth daily, Disp: 30 tablet, Rfl: 1    docusate sodium (COLACE) 100 mg capsule, Take 1 capsule (100 mg total) by mouth 2 (two) times a day (Patient not taking: Reported on 4/13/2021), Disp: 60 capsule, Rfl: 0    metoprolol tartrate (LOPRESSOR) 25 mg tablet, Take 1 tablet (25 mg total) by mouth every 12 (twelve) hours, Disp: 180 tablet, Rfl: 3    omeprazole (PriLOSEC) 20 mg delayed release capsule, Take 1 capsule (20 mg total) by mouth daily (Patient not taking: Reported on 4/13/2021), Disp: 30 capsule, Rfl: 0    polyethylene glycol (MIRALAX) 17 g packet, Take 17 g by mouth daily as needed (for constipation) (Patient not taking: Reported on 9/8/2020), Disp: 14 each, Rfl: 0  Allergies   Allergen Reactions    Contrast Dye [Iodinated Diagnostic Agents] Anaphylaxis       Labs:  No visits with results within 6 Month(s) from this visit  Latest known visit with results is:   No results displayed because visit has over 200 results  Imaging: No results found      Physical Exam:  General:  Obese, awake, alert and oriented x3, not in distress  Neck: supple, no JVD  Eyes: PERRL, conjunctiva normal  Lungs:  Bilateral air entry positive, no wheeze/rhonchi or crackle  Heart:  S1-S2 normal, mild systolic murmur in aortic area  Abdomen:  Soft ,nondistended ,nontender, bowel sounds positive  Extremities:  No leg edema, no deformity, ROM normal  Neuro:  Moving all extremities, speech clear  Skin: warm, no rash    /70 (BP Location: Left arm, Patient Position: Sitting, Cuff Size: Standard)   Pulse (!) 44   Resp 16   Ht 5' 3" (1 6 m)   Wt 89 4 kg (197 lb)   SpO2 97%   BMI 34 90 kg/m²     Cardiographics :  limited echo in November 2020 showed LVEF more than 55%, moderate concentric LVH, mildly dilated LA, normally functioning  Tissue aortic valve with mean pressure gradient of 11 and peak velocity of 2 6 m/sec, mild MR, mild TR      Assessment:    1  Coronary artery disease  Status post single-vessel CABG SVG to OM2 in August 2020  Focal mid LAD stenosis status post intermediate IFR 0 88  At present time patient denies anginal symptoms    2  Severe aortic stenosis status post tissue valve replacement on 08/19/2020  21 mm Dennie Plume recently a pericardial tissue valve  Normally functioning    3  Sinus bradycardia  Asymptomatic at present time could be due to metoprolol    4  Paroxysmal atrial fibrillation  Currently in sinus rhythm   Patient was in Afib postop but less than 48 hour and anti coagulant was stopped  He underwent event monitoring which showed paroxysmal Afib and was restarted on Eliquis        5  Hypertension  6   Hyperlipidemia  7  BMI 34  8    Contrast allergy     Recommendations:    Decrease metoprolol tartrate to 25 mg twice a day  24 hour Holter to evaluate for any significant bradycardia or pauses    Patient to continue aspirin, Eliquis, Lipitor 80, lisinopril 2 5 mg daily    His advised to monitor blood pressure and heart rate at home and give us a call if blood pressure stays more than 130/80 or heart rate stays persistently low less than 60    Patient was educated about cardiac symptoms to watch out for and if he fails medical therapy then he will need further evaluation of moderate LAD disease    CBC including CMP and lipid panel ordered    Advised to take low-salt, low-fat/low-cholesterol diet and try to lose weight  Return to clinic in 6 months or early as needed  Above all discussed with patient    Patient understands and agrees

## 2022-05-16 DIAGNOSIS — I10 ESSENTIAL HYPERTENSION: ICD-10-CM

## 2022-05-16 RX ORDER — LISINOPRIL 2.5 MG/1
2.5 TABLET ORAL DAILY
Qty: 90 TABLET | Refills: 3 | Status: SHIPPED | OUTPATIENT
Start: 2022-05-16

## 2022-05-16 RX ORDER — LISINOPRIL 2.5 MG/1
TABLET ORAL
Qty: 30 TABLET | Refills: 1 | Status: SHIPPED | OUTPATIENT
Start: 2022-05-16 | End: 2022-05-16

## 2022-11-07 ENCOUNTER — TELEPHONE (OUTPATIENT)
Dept: CARDIOLOGY CLINIC | Facility: CLINIC | Age: 67
End: 2022-11-07

## 2022-11-07 NOTE — TELEPHONE ENCOUNTER
PT NEEDS A REFERRAL TO BE SEEN ON 11/10/22  CALLED PT'S PCP & I GOT HUNG UP ON  CALLED A 2ND TIME & WAS ON HOLD FOR 20 MINS   CALLED A 3RD TIME & HAD TO LEAVE A MSG  I CALLED PT & TOLD HIM WHAT WAS HAPPENING & HE SAID THAT HE WILL CALL HIS PCP & HAVE THEM FAX THE REFERRAL  PT SAID THAT HE MAY HAVE  [address] THE 11/10/22 APPT B/C HIS PCP MIGHT WANT TO SEE HIM FOR AN APPT PRIOR TO GENERATING A REFERRAL  PT WILL CALL BACK & 1 [facility]

## 2022-11-10 ENCOUNTER — OFFICE VISIT (OUTPATIENT)
Dept: CARDIOLOGY CLINIC | Facility: CLINIC | Age: 67
End: 2022-11-10

## 2022-11-10 VITALS
HEIGHT: 63 IN | SYSTOLIC BLOOD PRESSURE: 126 MMHG | DIASTOLIC BLOOD PRESSURE: 70 MMHG | RESPIRATION RATE: 16 BRPM | HEART RATE: 70 BPM | WEIGHT: 190 LBS | OXYGEN SATURATION: 96 % | BODY MASS INDEX: 33.66 KG/M2

## 2022-11-10 DIAGNOSIS — I21.4 NSTEMI (NON-ST ELEVATED MYOCARDIAL INFARCTION) (HCC): ICD-10-CM

## 2022-11-10 DIAGNOSIS — Z95.1 S/P CABG (CORONARY ARTERY BYPASS GRAFT): ICD-10-CM

## 2022-11-10 DIAGNOSIS — I10 PRIMARY HYPERTENSION: Chronic | ICD-10-CM

## 2022-11-10 DIAGNOSIS — I25.10 CORONARY ARTERY DISEASE INVOLVING NATIVE CORONARY ARTERY OF NATIVE HEART WITHOUT ANGINA PECTORIS: Primary | ICD-10-CM

## 2022-11-10 DIAGNOSIS — I35.0 NONRHEUMATIC AORTIC VALVE STENOSIS: ICD-10-CM

## 2022-11-10 DIAGNOSIS — Z95.2 S/P AVR (AORTIC VALVE REPLACEMENT): ICD-10-CM

## 2022-11-10 NOTE — PROGRESS NOTES
Cardiology Follow Up    Jewels Napier  1955  [id number]  [facility] [address]  [facility] [id number] [phone]        Interval History:   51-year-old male with coronary artery status post 1 vessel CABG SVG to OM2 at the time of aortic valve replacement, severe aortic stenosis status post pericardial tissue aortic wall on 08/19/2020, paroxysmal AFib on Eliquis, hypertension, hyperlipidemia, obesity, contrast allergy presents for follow-up  He now feels well with no cv symptoms  He doesn't exercise much  Takes care of wife who has ms  He tells me chol is good  No bleeding on eliquis  Patient was admitted in mid August 2020 at [facility] with complaints of chest pain   He ruled in for non-STEMI    Echocardiogram showed preserved LVEF with moderate to severe LVH and severe aortic stenosis   Cardiac catheterization showed 100% occluded mid circ with right to left collateral, proximal LAD 50% stenosis status post negative IFR 0 96, focal mid LAD stenosis with positive IFR of 0 88 with brisk flow distally, luminal irregularities in RCA   He underwent single vessel CABG SVG to OM 2 and aortic wall replacement with 21 mm Morrison resilia pericardial tissue valve on 08/19/2020)    Patient Active Problem List   Diagnosis   • NSTEMI (non-ST elevated myocardial infarction) ([address] )   • Accelerated hypertension   • Elevated brain natriuretic peptide (BNP) level   • Allergic reaction to contrast dye   • Chest pain   • Leukocytosis   • Aortic stenosis   • HTN (hypertension)   • HLD (hyperlipidemia)   • CAD (coronary artery disease)   • S/P CABG (coronary artery bypass graft)   • S/P AVR (aortic valve replacement)   • Thrombocytopenia (HCC)   • Hypocalcemia   • Irritation of right eye   • Postoperative atrial fibrillation ([address] )     Past Medical History:   Diagnosis Date   • HLD (hyperlipidemia)    • HTN (hypertension)      Social History     Socioeconomic History   • Marital status: /Civil Union     Spouse name: Not on file   • Number of children: Not on file   • Years of education: Not on file   • Highest education level: Not on file   Occupational History   • Occupation: occupational therapist     Employer: [employer]   Tobacco Use   • Smoking status: Never Smoker   • Smokeless tobacco: Current User     Types: Snuff   Vaping Use   • Vaping Use: Never used   Substance and Sexual Activity   • Alcohol use: Not Currently   • Drug use: Not Currently   • Sexual activity: Not Currently   Other Topics Concern   • Not on file   Social History Narrative   • Not on file     Social Determinants of Health     Financial Resource Strain: Not on file   Food Insecurity: Not on file   Transportation Needs: Not on file   Physical Activity: Not on file   Stress: Not on file   Social Connections: Not on file   Intimate Partner Violence: Not on file   Housing Stability: Not on file      No family history on file    Past Surgical History:   Procedure Laterality Date   • CORONARY ARTERY BYPASS GRAFT WITH VALVE REPLACEMENT Left 8/19/2020    Procedure: CORONARY ARTERY BYPASS GRAFT (CABG) x 1; Left EVH/SVG to OM2; AVR with Morrison 21 mm Inspiris Tissue Valve;  Surgeon: Aguila Raymundo MD;  [facility];  Service: Cardiac Surgery   • KNEE ARTHROSCOPY Left    • ROTATOR CUFF REPAIR Left        Current Outpatient Medications:   •  aspirin (ECOTRIN LOW STRENGTH) 81 mg EC tablet, Take 1 tablet (81 mg total) by mouth daily, Disp: 90 tablet, Rfl: 3  •  atorvastatin (LIPITOR) 80 mg tablet, take 1 tablet by mouth once daily with dinner, Disp: 90 tablet, Rfl: 3  •  Eliquis 5 MG, TAKE 1 TABLET BY MOUTH TWICE A DAY, Disp: 60 tablet, Rfl: 2  •  lisinopril (ZESTRIL) 2 5 mg tablet, Take 1 tablet (2 5 mg total) by mouth in the morning , Disp: 90 tablet, Rfl: 3  •  metoprolol tartrate (LOPRESSOR) 25 mg tablet, Take 1 tablet (25 mg total) by mouth every 12 (twelve) hours, Disp: 180 tablet, Rfl: 3  Allergies   Allergen Reactions   • Contrast Dye [Iodinated Diagnostic Agents] Anaphylaxis       Labs:  No visits with results within 2 Month(s) from this visit  Latest known visit with results is:   No results displayed because visit has over 200 results  Imaging: No results found  Review of Systems:  Review of Systems Constitutional:  No fever or chills  Cardiac:  No chest pain, shortness of breath  Respiratory:    No coughing, hemoptysis, wheezing  Abdominal:  No nausea, vomiting, abdominal discomfort  urinary:  No hematuria  Extremities:  No swelling nor edema  Physical Exam:  Physical Exam HEENT:  Unremarkable  No JVD  Lungs:  Clear  Cardiac:  Regular rate and rhythm with no murmurs rubs nor gallops  Abdomen:  Soft, nontender, no rebound, normal bowel sounds  Extremities:  No clubbing cyanosis nor edema  Neuro:  Grossly nonfocal   Psych:  Alert and oriented x3    Echo November 23, 2020:  IMPRESSIONS:  Limited echo  LVEF is preserved  Moderate concentric LVH  Normally functioning aortic pericardial tissue valve    Cardiac event monitor September 28, 2020: 2 episodes of paroxysmal atrial fibrillation           1  NSTEMI (non-ST elevated myocardial infarction) ([facility] [address] )     2  Primary hypertension     3  Nonrheumatic aortic valve stenosis     4  Coronary artery disease involving native coronary artery of native heart without angina pectoris     5  S/P CABG (coronary artery bypass graft)     6  S/P AVR (aortic valve replacement)         Discussion/Summary:  He is doing great, stable and asymptomatic from a cardiovascular standpoint  Check echocardiogram to follow AVR, last echo 2 years ago  LDL was 109 in 2020  Repeat  Continue Eliquis for PAF, aspirin, atorvastatin, lisinopril and metoprolol  BP is well controlled  The primary is watching his blood sugars  Multiple records reviewed

## 2022-12-16 ENCOUNTER — HOSPITAL ENCOUNTER (OUTPATIENT)
Dept: NON INVASIVE DIAGNOSTICS | Facility: CLINIC | Age: 67
Discharge: HOME | End: 2022-12-16

## 2022-12-16 VITALS
BODY MASS INDEX: 33.66 KG/M2 | SYSTOLIC BLOOD PRESSURE: 126 MMHG | HEART RATE: 50 BPM | WEIGHT: 190 LBS | HEIGHT: 63 IN | DIASTOLIC BLOOD PRESSURE: 70 MMHG

## 2022-12-16 DIAGNOSIS — I35.0 NONRHEUMATIC AORTIC VALVE STENOSIS: ICD-10-CM

## 2022-12-16 DIAGNOSIS — Z95.2 S/P AVR (AORTIC VALVE REPLACEMENT): ICD-10-CM

## 2022-12-16 LAB
AORTIC ROOT: 3.1 CM
AORTIC VALVE MEAN VELOCITY: 20.1 M/S
APICAL FOUR CHAMBER EJECTION FRACTION: 68 %
AV AREA BY CONTINUOUS VTI: 1.6 CM2
AV AREA PEAK VELOCITY: 1.3 CM2
AV LVOT MEAN GRADIENT: 6 MMHG
AV LVOT PEAK GRADIENT: 9 MMHG
AV MEAN GRADIENT: 18 MMHG
AV PEAK GRADIENT: 34 MMHG
AV VALVE AREA: 1.59 CM2
AV VELOCITY RATIO: 0.51
DOP CALC AO PEAK VEL: 2.9 M/S
DOP CALC AO VTI: 69.91 CM
DOP CALC LVOT AREA: 2.54 CM2
DOP CALC LVOT DIAMETER: 1.8 CM
DOP CALC LVOT PEAK VEL VTI: 43.64 CM
DOP CALC LVOT PEAK VEL: 1.49 M/S
DOP CALC LVOT STROKE INDEX: 60.8 ML/M2
DOP CALC LVOT STROKE VOLUME: 110.99 CM3
E WAVE DECELERATION TIME: 308 MS
FRACTIONAL SHORTENING: 32 % (ref 28–44)
INTERVENTRICULAR SEPTUM IN DIASTOLE (PARASTERNAL SHORT AXIS VIEW): 1.4 CM
INTERVENTRICULAR SEPTUM: 1.4 CM (ref 0.6–1.1)
LAAS-AP2: 15.9 CM2
LAAS-AP4: 16.2 CM2
LEFT ATRIUM AREA SYSTOLE SINGLE PLANE A4C: 16.7 CM2
LEFT ATRIUM SIZE: 3.9 CM
LEFT INTERNAL DIMENSION IN SYSTOLE: 2.8 CM (ref 2.1–4)
LEFT VENTRICULAR INTERNAL DIMENSION IN DIASTOLE: 4.1 CM (ref 3.5–6)
LEFT VENTRICULAR POSTERIOR WALL IN END DIASTOLE: 1.1 CM
LEFT VENTRICULAR STROKE VOLUME: 45 ML
LVSV (TEICH): 45 ML
MITRAL REGURGITATION PEAK VELOCITY: 5.3 M/S
MITRAL VALVE MEAN INFLOW VELOCITY: 4.31 M/S
MITRAL VALVE REGURGITANT PEAK GRADIENT: 112 MMHG
MV E'TISSUE VEL-LAT: 6 CM/S
MV PEAK A VEL: 1.14 M/S
MV PEAK E VEL: 122 CM/S
MV STENOSIS PRESSURE HALF TIME: 89 MS
MV VALVE AREA P 1/2 METHOD: 2.47 CM2
RIGHT ATRIUM AREA SYSTOLE A4C: 14 CM2
RIGHT VENTRICLE ID DIMENSION: 3.8 CM
SL CV DOP CALC MV VTI RETROGRADE: 193.6 CM
SL CV LEFT ATRIUM LENGTH A2C: 5 CM
SL CV LV EF: 60
SL CV MV MEAN GRADIENT RETROGRADE: 81 MMHG
SL CV PED ECHO LEFT VENTRICLE DIASTOLIC VOLUME (MOD BIPLANE) 2D: 74 ML
SL CV PED ECHO LEFT VENTRICLE SYSTOLIC VOLUME (MOD BIPLANE) 2D: 29 ML
TR MAX PG: 28 MMHG
TR PEAK VELOCITY: 2.7 M/S
TRICUSPID VALVE PEAK REGURGITATION VELOCITY: 2.66 M/S

## 2022-12-20 ENCOUNTER — TELEPHONE (OUTPATIENT)
Dept: CARDIOLOGY CLINIC | Facility: CLINIC | Age: 67
End: 2022-12-20

## 2022-12-20 NOTE — TELEPHONE ENCOUNTER
----- Message from Edwina Duarte MD sent at 12/19/2022  1:55 PM EST -----  Pls call pt and tell him echo shows good prosthetic valve function, mild blockage     ----- Message -----  From: Emile Leslie MD  Sent: 12/16/2022   9:22 AM EST  To: Edwina Duarte MD

## 2023-02-20 DIAGNOSIS — Z95.1 S/P CABG (CORONARY ARTERY BYPASS GRAFT): ICD-10-CM

## 2023-02-20 DIAGNOSIS — I25.10 CORONARY ARTERY DISEASE INVOLVING NATIVE CORONARY ARTERY OF NATIVE HEART WITHOUT ANGINA PECTORIS: ICD-10-CM

## 2023-02-20 DIAGNOSIS — Z95.2 S/P AVR (AORTIC VALVE REPLACEMENT): ICD-10-CM

## 2023-02-20 DIAGNOSIS — I35.0 NONRHEUMATIC AORTIC VALVE STENOSIS: ICD-10-CM

## 2023-02-20 RX ORDER — ATORVASTATIN CALCIUM 80 MG/1
80 TABLET, FILM COATED ORAL
Qty: 90 TABLET | Refills: 3 | Status: SHIPPED | OUTPATIENT
Start: 2023-02-20

## 2023-03-01 DIAGNOSIS — I48.0 PAROXYSMAL ATRIAL FIBRILLATION (HCC): ICD-10-CM

## 2023-03-01 NOTE — TELEPHONE ENCOUNTER
Name from pharmacy: Onesimo Gosselin 5 MG TABLET          Will file in chart as: Eliquis 5 MG    Sig: TAKE 1 TABLET BY MOUTH TWICE A DAY    Disp:  60 tablet    Refills:  2    Start: 3/1/2023    Class: Normal    Non-formulary For: Paroxysmal atrial fibrillation ([facility] [address] )    To pharmacy: DX Code Needed    Last ordered: 4 months ago by Martin Rodríguez MD Last refill: 2/4/2023    Rx #: [id number]    Hematology:  Anticoagulants Failed 03/01/2023 08:10 AM   Protocol Details  HGB in normal range and within 360 days    PLT in normal range and within 360 days    HCT in normal range and within 360 days    eGFR is 60 or above and within 360 days    Valid encounter within last 6 months      To be filled at: [id number] [place] - [address]  Please review and refill if possible  Thanks!

## 2023-03-03 RX ORDER — APIXABAN 5 MG/1
TABLET, FILM COATED ORAL
Qty: 60 TABLET | Refills: 2 | Status: SHIPPED | OUTPATIENT
Start: 2023-03-03

## 2023-05-01 DIAGNOSIS — I25.10 ATHEROSCLEROSIS OF NATIVE CORONARY ARTERY OF NATIVE HEART WITHOUT ANGINA PECTORIS: ICD-10-CM

## 2023-05-29 DIAGNOSIS — I10 ESSENTIAL HYPERTENSION: ICD-10-CM

## 2023-06-01 RX ORDER — LISINOPRIL 2.5 MG/1
2.5 TABLET ORAL DAILY
Qty: 90 TABLET | Refills: 3 | Status: SHIPPED | OUTPATIENT
Start: 2023-06-01

## 2023-08-10 DIAGNOSIS — I48.0 PAROXYSMAL ATRIAL FIBRILLATION (HCC): ICD-10-CM

## 2023-08-10 RX ORDER — APIXABAN 5 MG/1
TABLET, FILM COATED ORAL
Qty: 60 TABLET | Refills: 2 | Status: SHIPPED | OUTPATIENT
Start: 2023-08-10

## 2023-09-13 ENCOUNTER — OFFICE VISIT (OUTPATIENT)
Dept: CARDIOLOGY CLINIC | Facility: CLINIC | Age: 68
End: 2023-09-13
Payer: COMMERCIAL

## 2023-09-13 VITALS
DIASTOLIC BLOOD PRESSURE: 76 MMHG | OXYGEN SATURATION: 97 % | HEIGHT: 63 IN | SYSTOLIC BLOOD PRESSURE: 142 MMHG | BODY MASS INDEX: 32.96 KG/M2 | RESPIRATION RATE: 16 BRPM | HEART RATE: 58 BPM | WEIGHT: 186 LBS

## 2023-09-13 DIAGNOSIS — I10 PRIMARY HYPERTENSION: Primary | Chronic | ICD-10-CM

## 2023-09-13 DIAGNOSIS — I97.89 POSTOPERATIVE ATRIAL FIBRILLATION (HCC): ICD-10-CM

## 2023-09-13 DIAGNOSIS — I10 ESSENTIAL HYPERTENSION: ICD-10-CM

## 2023-09-13 DIAGNOSIS — I48.91 POSTOPERATIVE ATRIAL FIBRILLATION (HCC): ICD-10-CM

## 2023-09-13 DIAGNOSIS — I35.0 NONRHEUMATIC AORTIC VALVE STENOSIS: ICD-10-CM

## 2023-09-13 DIAGNOSIS — I21.4 NSTEMI (NON-ST ELEVATED MYOCARDIAL INFARCTION) (HCC): ICD-10-CM

## 2023-09-13 DIAGNOSIS — I25.10 CORONARY ARTERY DISEASE INVOLVING NATIVE CORONARY ARTERY OF NATIVE HEART WITHOUT ANGINA PECTORIS: ICD-10-CM

## 2023-09-13 DIAGNOSIS — Z95.2 S/P AVR (AORTIC VALVE REPLACEMENT): ICD-10-CM

## 2023-09-13 DIAGNOSIS — Z95.1 S/P CABG (CORONARY ARTERY BYPASS GRAFT): ICD-10-CM

## 2023-09-13 PROCEDURE — 99214 OFFICE O/P EST MOD 30 MIN: CPT | Performed by: INTERNAL MEDICINE

## 2023-09-13 RX ORDER — LISINOPRIL 2.5 MG/1
5 TABLET ORAL DAILY
Qty: 90 TABLET | Refills: 3 | Status: SHIPPED | OUTPATIENT
Start: 2023-09-13

## 2023-09-13 NOTE — PROGRESS NOTES
Cardiology Follow Up    Zaria Aguayo  1955  [id number]  [facility] [address] [address] [phone]        Interval History:   17-year-old male with coronary artery status post 1 vessel CABG SVG to OM2 at the time of aortic valve replacement, severe aortic stenosis status post pericardial tissue aortic wall on 08/19/2020, paroxysmal AFib on Eliquis, hypertension, hyperlipidemia, obesity, contrast allergy presents for follow-up. He now feels well with no cv symptoms. He doesn't exercise much. Takes care of wife who has ms and has 2 jobs. No bleeding on eliquis. Echo 12/16/2022: Normal left ventricular function, EF 96%, LVH, diastolic dysfunction. There is a prosthetic aortic valve replacement. Mild prosthetic aortic valve stenosis, peak/mean gradient 34/18 mmHg. .  Mild MR. Patient was admitted in mid August 2020 at [facility] with complaints of chest pain.  He ruled in for non-STEMI.   Echocardiogram showed preserved LVEF with moderate to severe LVH and severe aortic stenosis.  Cardiac catheterization showed 100% occluded mid circ with right to left collateral, proximal LAD 50% stenosis status post negative IFR 0.96, focal mid LAD stenosis with positive IFR of 0.88 with brisk flow distally, luminal irregularities in RCA.  He underwent single vessel CABG SVG to OM 2 and aortic wall replacement with 21 mm Morriosn resilia pericardial tissue valve on 08/19/2020)    Patient Active Problem List   Diagnosis   • NSTEMI (non-ST elevated myocardial infarction) ([address])   • Accelerated hypertension   • Elevated brain natriuretic peptide (BNP) level   • Allergic reaction to contrast dye   • Chest pain   • Leukocytosis   • Aortic stenosis   • HTN (hypertension)   • HLD (hyperlipidemia)   • CAD (coronary artery disease)   • S/P CABG (coronary artery bypass graft)   • S/P AVR (aortic valve replacement)   • Thrombocytopenia (HCC)   • Hypocalcemia   • Irritation of right eye   • Postoperative atrial fibrillation (HCC)     Past Medical History:   Diagnosis Date   • HLD (hyperlipidemia)    • HTN (hypertension)      Social History     Socioeconomic History   • Marital status: /Civil Union     Spouse name: Not on file   • Number of children: Not on file   • Years of education: Not on file   • Highest education level: Not on file   Occupational History   • Occupation: occupational therapist     Employer: [address] [place] [address]   Tobacco Use   • Smoking status: Never   • Smokeless tobacco: Current     Types: Snuff   Vaping Use   • Vaping Use: Never used   Substance and Sexual Activity   • Alcohol use: Not Currently   • Drug use: Not Currently   • Sexual activity: Not Currently   Other Topics Concern   • Not on file   Social History Narrative   • Not on file     Social Determinants of Health     Financial Resource Strain: Not on file   Food Insecurity: Not on file   Transportation Needs: Not on file   Physical Activity: Not on file   Stress: Not on file   Social Connections: Not on file   Intimate Partner Violence: Not on file   Housing Stability: Not on file      History reviewed. No pertinent family history.   Past Surgical History:   Procedure Laterality Date   • CORONARY ARTERY BYPASS GRAFT WITH VALVE REPLACEMENT Left 8/19/2020    Procedure: CORONARY ARTERY BYPASS GRAFT (CABG) x 1; Left EVH/SVG to OM2; AVR with Morrison 21 mm Inspiris Tissue Valve;  Surgeon: Isabela Browne MD;  [facility];  Service: Cardiac Surgery   • KNEE ARTHROSCOPY Left    • ROTATOR CUFF REPAIR Left        Current Outpatient Medications:   •  aspirin (ECOTRIN LOW STRENGTH) 81 mg EC tablet, Take 1 tablet (81 mg total) by mouth daily, Disp: 90 tablet, Rfl: 3  •  atorvastatin (LIPITOR) 80 mg tablet, Take 1 tablet (80 mg total) by mouth daily with dinner, Disp: 90 tablet, Rfl: 3  •  Eliquis 5 MG, TAKE 1 TABLET BY MOUTH TWICE A DAY, Disp: 60 tablet, Rfl: 2  •  lisinopril (ZESTRIL) 2.5 mg tablet, TAKE 1 TABLET (2.5 MG TOTAL) BY MOUTH IN THE MORNING., Disp: 90 tablet, Rfl: 3  •  metoprolol tartrate (LOPRESSOR) 25 mg tablet, TAKE 1 TABLET (25 MG TOTAL) BY MOUTH EVERY 12 (TWELVE) HOURS, Disp: 180 tablet, Rfl: 3  Allergies   Allergen Reactions   • Contrast Dye [Iodinated Contrast Media] Anaphylaxis       Labs:  No visits with results within 2 Month(s) from this visit.    Latest known visit with results is:   Hospital Outpatient Visit on 12/16/2022   Component Date Value   • AV area peak evens 12/16/2022 1.3    • MV mean gradient retrogr* 12/16/2022 81    • LA size 12/16/2022 3.9    • Aortic valve mean veloci* 12/16/2022 20.10    • Mitral regurgitation pea* 12/16/2022 5.30    • Mitral valve mean inflow* 12/16/2022 4.31    • Triscuspid Valve Regurgi* 12/16/2022 28.0    • Tricuspid valve peak reg* 12/16/2022 2.66    • LVPWd 12/16/2022 1.10    • Left Atrium Area-systoli* 12/16/2022 15.9    • Left Atrium Area-systoli* 12/16/2022 16.2    • MV E' Tissue Velocity La* 12/16/2022 6    • TR Peak Evens 12/16/2022 2.7    • IVSd 12/16/2022 8.11    • LV DIASTOLIC VOLUME (MOD* 34/04/5105 74    • LEFT VENTRICLE SYSTOLIC * 45/03/1882 29    • Left ventricular stroke * 12/16/2022 45.00    • MV VTI RETROGRADE 12/16/2022 193.6    • A4C EF 12/16/2022 68    • LA length (A2C) 12/16/2022 5.00    • LVIDd 12/16/2022 4.10    • IVS 12/16/2022 1.4    • LVIDS 12/16/2022 2.80    • FS 12/16/2022 32    • Ao root 12/16/2022 3.10    • RVID d 12/16/2022 3.8    • LVOT mn grad 12/16/2022 6.0    • AV area by cont VTI 12/16/2022 1.6    • AV mean gradient 12/16/2022 18    • AV LVOT peak gradient 12/16/2022 9    • MV valve area p 1/2 meth* 12/16/2022 2.47    • E wave deceleration time 12/16/2022 308    • LVOT diameter 12/16/2022 1.8    • LVOT peak evens 12/16/2022 1.49    • LVOT peak VTI 12/16/2022 43.64    • Aortic valve peak veloci* 12/16/2022 2.9    • Ao VTI 12/16/2022 69.91    • LVOT stroke volume 12/16/2022 110.99    • AV peak gradient 12/16/2022 34    • MV Peak E Evens 12/16/2022 122    • MV Peak A Evens 12/16/2022 1.14    • SERENA A4C 12/16/2022 16.7    • MR PG 12/16/2022 112    • RAA A4C 12/16/2022 14    • MV stenosis pressure 1/2* 12/16/2022 89    • LVOT stroke volume index 12/16/2022 60.80    • LVSV, 2D 12/16/2022 45    • LVOT area 12/16/2022 2.54    • DVI 12/16/2022 0.51    • AV valve area 12/16/2022 1.59    • LV EF 12/16/2022 60      Imaging: No results found. Review of Systems:  Review of Systems Constitutional:  No fever or chills  Cardiac:  No chest pain, shortness of breath. Respiratory:  . No coughing, hemoptysis, wheezing. Abdominal:  No nausea, vomiting, abdominal discomfort. urinary:  No hematuria  Extremities:  No swelling nor edema. Physical Exam:  Physical Exam HEENT:  Unremarkable. No JVD. Lungs:  Clear  Cardiac:  Regular rate and rhythm with no murmurs rubs nor gallops. Abdomen:  Soft, nontender, no rebound, normal bowel sounds. Extremities:  No clubbing cyanosis nor edema. Neuro:  Grossly nonfocal.  Psych:  Alert and oriented x3    Echo November 23, 2020:  IMPRESSIONS:  Limited echo  LVEF is preserved  Moderate concentric LVH  Normally functioning aortic pericardial tissue valve    Cardiac event monitor September 28, 2020: 2 episodes of paroxysmal atrial fibrillation           1. Primary hypertension        2. NSTEMI (non-ST elevated myocardial infarction) ([address])        3. Nonrheumatic aortic valve stenosis        4. Coronary artery disease involving native coronary artery of native heart without angina pectoris        5. Postoperative atrial fibrillation (HCC)        6. S/P CABG (coronary artery bypass graft)        7. S/P AVR (aortic valve replacement)            Discussion/Summary:  He is doing great, stable and asymptomatic from a cardiovascular standpoint. Echocardiogram showed normally functioning bioprosthetic AVR. Mild as.   LDL was 109 in 2020. Repeat. Continue Eliquis for PAF, aspirin, atorvastatin, lisinopril and metoprolol. BP is well controlled. The primary is watching his blood sugars. Bps at home run 118-132, higher here. Increase lisinopril to 5 mg a day. He will make appt to see pcp to discuss ed. Multiple records reviewed.

## 2023-10-06 ENCOUNTER — TELEPHONE (OUTPATIENT)
Dept: CARDIOLOGY CLINIC | Facility: CLINIC | Age: 68
End: 2023-10-06

## 2023-10-06 DIAGNOSIS — I10 ESSENTIAL HYPERTENSION: ICD-10-CM

## 2023-10-06 RX ORDER — LISINOPRIL 2.5 MG/1
5 TABLET ORAL DAILY
Qty: 90 TABLET | Refills: 3 | Status: SHIPPED | OUTPATIENT
Start: 2023-10-06

## 2023-10-06 NOTE — TELEPHONE ENCOUNTER
Patient states that Lisinopril should be sent to [facility] not [facility]. Please resend the medication.     [phone]

## 2023-11-12 DIAGNOSIS — I10 ESSENTIAL HYPERTENSION: ICD-10-CM

## 2023-11-13 RX ORDER — LISINOPRIL 2.5 MG/1
5 TABLET ORAL DAILY
Qty: 180 TABLET | Refills: 2 | Status: SHIPPED | OUTPATIENT
Start: 2023-11-13

## 2023-12-05 DIAGNOSIS — I48.0 PAROXYSMAL ATRIAL FIBRILLATION (HCC): ICD-10-CM

## 2023-12-05 NOTE — TELEPHONE ENCOUNTER
Patient Zita Goltz [phone] contacting office requesting 30 day Eliquis medication refill to be sent to [facility] in [facility].

## 2024-02-19 DIAGNOSIS — I48.0 PAROXYSMAL ATRIAL FIBRILLATION (HCC): ICD-10-CM

## 2024-02-19 NOTE — TELEPHONE ENCOUNTER
Name of Caller:  Patient     Call back Number:  [phone]    Medication(s): apixaban (Eliquis) 5 mg    Are we prescribing provider?:    30 or 90 day supply: 90 day    Pharmacy name/number: [facility]/pharmacy #[id number] - [place] [address]     Last or Next appt?:

## 2024-04-01 DIAGNOSIS — I48.0 PAROXYSMAL ATRIAL FIBRILLATION (HCC): ICD-10-CM

## 2024-04-01 NOTE — TELEPHONE ENCOUNTER
Patient Vasile [phone] contacting office requesting 90 day Eliquis medication refill to go through [facility] in [place].

## 2024-05-03 DIAGNOSIS — I25.10 ATHEROSCLEROSIS OF NATIVE CORONARY ARTERY OF NATIVE HEART WITHOUT ANGINA PECTORIS: ICD-10-CM

## 2024-06-07 DIAGNOSIS — I48.0 PAROXYSMAL ATRIAL FIBRILLATION (HCC): ICD-10-CM

## 2024-06-07 NOTE — TELEPHONE ENCOUNTER
Patient contacted medication refill line requesting refill(s) be sent to pharmacy on file (confirmed) of:    ELIQUIS 5 mg      (Assisting with medication refill line.)

## 2024-07-21 DIAGNOSIS — I48.0 PAROXYSMAL ATRIAL FIBRILLATION (HCC): ICD-10-CM

## 2024-07-22 RX ORDER — APIXABAN 5 MG/1
5 TABLET, FILM COATED ORAL 2 TIMES DAILY
Qty: 60 TABLET | Refills: 0 | Status: SHIPPED | OUTPATIENT
Start: 2024-07-22

## 2024-08-18 DIAGNOSIS — I25.10 ATHEROSCLEROSIS OF NATIVE CORONARY ARTERY OF NATIVE HEART WITHOUT ANGINA PECTORIS: ICD-10-CM

## 2024-08-18 RX ORDER — METOPROLOL TARTRATE 25 MG/1
25 TABLET, FILM COATED ORAL EVERY 12 HOURS SCHEDULED
Qty: 60 TABLET | Refills: 0 | Status: SHIPPED | OUTPATIENT
Start: 2024-08-18

## 2024-08-25 DIAGNOSIS — I10 ESSENTIAL HYPERTENSION: ICD-10-CM

## 2024-08-30 RX ORDER — LISINOPRIL 2.5 MG/1
5 TABLET ORAL DAILY
Qty: 180 TABLET | Refills: 1 | Status: SHIPPED | OUTPATIENT
Start: 2024-08-30

## 2024-09-03 DIAGNOSIS — I48.0 PAROXYSMAL ATRIAL FIBRILLATION (HCC): ICD-10-CM

## 2024-09-03 NOTE — TELEPHONE ENCOUNTER
Reason for call:   [x] Refill   [] Prior Auth  [] Other:     Office:   [] PCP/Provider -   [x] Specialty/Provider -     Medication:     Eliquis 5 MG       Dose/Frequency:  TAKE 1 TABLET BY MOUTH TWICE A DAY,     Quantity: : 60 tablet     Pharmacy: [facility]/pharmacy #[id number] - [place] - [address] [phone]     Does the patient have enough for 3 days?   [x] Yes   [] No - Send as HP to POD

## 2024-09-12 DIAGNOSIS — I25.10 ATHEROSCLEROSIS OF NATIVE CORONARY ARTERY OF NATIVE HEART WITHOUT ANGINA PECTORIS: ICD-10-CM

## 2024-09-12 RX ORDER — METOPROLOL TARTRATE 25 MG/1
25 TABLET, FILM COATED ORAL EVERY 12 HOURS SCHEDULED
Qty: 60 TABLET | Refills: 0 | Status: SHIPPED | OUTPATIENT
Start: 2024-09-12

## 2024-10-04 ENCOUNTER — OFFICE VISIT (OUTPATIENT)
Dept: CARDIOLOGY CLINIC | Facility: CLINIC | Age: 69
End: 2024-10-04
Payer: COMMERCIAL

## 2024-10-04 VITALS
OXYGEN SATURATION: 97 % | SYSTOLIC BLOOD PRESSURE: 130 MMHG | DIASTOLIC BLOOD PRESSURE: 78 MMHG | HEIGHT: 64 IN | WEIGHT: 187 LBS | HEART RATE: 56 BPM | BODY MASS INDEX: 31.92 KG/M2 | RESPIRATION RATE: 16 BRPM

## 2024-10-04 DIAGNOSIS — E78.2 MIXED HYPERLIPIDEMIA: ICD-10-CM

## 2024-10-04 DIAGNOSIS — I48.91 POSTOPERATIVE ATRIAL FIBRILLATION (HCC): ICD-10-CM

## 2024-10-04 DIAGNOSIS — I35.0 NONRHEUMATIC AORTIC VALVE STENOSIS: ICD-10-CM

## 2024-10-04 DIAGNOSIS — Z79.01 CHRONIC ANTICOAGULATION: ICD-10-CM

## 2024-10-04 DIAGNOSIS — I25.10 CORONARY ARTERY DISEASE INVOLVING NATIVE CORONARY ARTERY OF NATIVE HEART WITHOUT ANGINA PECTORIS: ICD-10-CM

## 2024-10-04 DIAGNOSIS — I10 ESSENTIAL HYPERTENSION: Primary | ICD-10-CM

## 2024-10-04 DIAGNOSIS — I97.89 POSTOPERATIVE ATRIAL FIBRILLATION (HCC): ICD-10-CM

## 2024-10-04 DIAGNOSIS — I48.0 PAROXYSMAL ATRIAL FIBRILLATION (HCC): ICD-10-CM

## 2024-10-04 DIAGNOSIS — Z95.2 S/P AVR (AORTIC VALVE REPLACEMENT): ICD-10-CM

## 2024-10-04 DIAGNOSIS — Z95.1 S/P CABG (CORONARY ARTERY BYPASS GRAFT): ICD-10-CM

## 2024-10-04 PROCEDURE — 99214 OFFICE O/P EST MOD 30 MIN: CPT

## 2024-10-04 NOTE — ASSESSMENT & PLAN NOTE
S/p CABG x 1 SVG to OM 2 and AVR  Patient denies chest pain or anginal equivalent  Medications:  Antiplatelet: ASA  Beta-blocker: Lopressor 25 mg twice daily  Statin: Atorvastatin 80 mg daily  BMP, CBC and Lipid Panel ordered

## 2024-10-04 NOTE — PROGRESS NOTES
[facility] [place]  [address]  [place] [address]  Cardiology Office Note    Vasile Aguero 69 y.o. male MRN: [id number]    10/04/24          Assessment/Plan:  Assessment & Plan  Coronary artery disease involving native coronary artery of native heart without angina pectoris  S/p CABG x 1 SVG to OM 2 and AVR  Patient denies chest pain or anginal equivalent  Medications:  Antiplatelet: ASA  Beta-blocker: Lopressor 25 mg twice daily  Statin: Atorvastatin 80 mg daily  BMP, CBC and Lipid Panel ordered  Essential hypertension  Blood pressure well-controlled on lisinopril 5 mg daily and Lopressor 25 mg twice daily.  Mixed hyperlipidemia  Continue atorvastatin  Chronic anticoagulation  On Eliquis 5 mg twice daily  Postoperative atrial fibrillation (HCC)  Patient maintaining sinus rhythm  Continue Lopressor 25 mg twice daily  Continue Eliquis 5 mg twice daily.         Follow up: 6 months or sooner as needed  All questions and concerns addressed.  Patient was advised to report any problems requiring medical attention.          1. Essential hypertension  Basic metabolic panel      2. Mixed hyperlipidemia  Lipid Panel With Direct LDL      3. Chronic anticoagulation  CBC and differential      4. Postoperative atrial fibrillation (HCC)        5. Paroxysmal atrial fibrillation (HCC)  apixaban (Eliquis) 5 mg      6. S/P AVR (aortic valve replacement)        7. S/P CABG (coronary artery bypass graft)        8. Nonrheumatic aortic valve stenosis        9. Coronary artery disease involving native coronary artery of native heart without angina pectoris            HPI: Vasile Aguero is a 69 y.o. year old male with PMH of CAD s/p CABG x 1 SVG to OM 2 and AVR, severe aortic stenosis s/p pericardial tissue aortic wall on 8/19/2020, A-fib on Eliquis, hypertension, hyperlipidemia, contrast allergy, who presents for routine follow-up. Patient is known to Dr. Prasad.    Patient appears to be doing  well from cardiac standpoint. Patient denies any chest pain, shortness of breath, palpitations, or lower extremity edema.  Patient denies any cardiac concerns at today's visit.    Patient denies any bleeding issues on Eliquis. Patient works and cares for wife with MS. He also has two children in college.    BP well controlled. Patient does not have recent lab work so this was ordered to be done prior to next visit.     Patient was instructed to call the office or seek medical attention if any significant chest pain, shortness of breath, palpitations, or lower extremity swelling develop. All medications reviewed and patient is tolerating medications without side effects.     Social history:   Patient denies tobacco, significant alcohol, or recreational drug use.            Patient Active Problem List   Diagnosis    NSTEMI (non-ST elevated myocardial infarction) (HCC)    Accelerated hypertension    Elevated brain natriuretic peptide (BNP) level    Allergic reaction to contrast dye    Chest pain    Leukocytosis    Aortic stenosis    HTN (hypertension)    HLD (hyperlipidemia)    CAD (coronary artery disease)    S/P CABG (coronary artery bypass graft)    S/P AVR (aortic valve replacement)    Thrombocytopenia (HCC)    Hypocalcemia    Irritation of right eye    Postoperative atrial fibrillation (HCC)       Allergies   Allergen Reactions    Contrast Dye [Iodinated Contrast Media] Anaphylaxis         Current Outpatient Medications:     apixaban (Eliquis) 5 mg, Take 1 tablet (5 mg total) by mouth 2 (two) times a day, Disp: 180 tablet, Rfl: 3    aspirin (ECOTRIN LOW STRENGTH) 81 mg EC tablet, Take 1 tablet (81 mg total) by mouth daily, Disp: 90 tablet, Rfl: 3    atorvastatin (LIPITOR) 80 mg tablet, Take 1 tablet (80 mg total) by mouth daily with dinner, Disp: 90 tablet, Rfl: 3    lisinopril (ZESTRIL) 2.5 mg tablet, TAKE 2 TABLETS BY MOUTH EVERY DAY, Disp: 180 tablet, Rfl: 1    metoprolol tartrate (LOPRESSOR) 25 mg tablet, TAKE  1 TABLET (25 MG TOTAL) BY MOUTH EVERY 12 (TWELVE) HOURS, Disp: 60 tablet, Rfl: 0    Past Medical History:   Diagnosis Date    HLD (hyperlipidemia)     HTN (hypertension)        No family history on file.    Past Surgical History:   Procedure Laterality Date    CORONARY ARTERY BYPASS GRAFT WITH VALVE REPLACEMENT Left 8/19/2020    Procedure: CORONARY ARTERY BYPASS GRAFT (CABG) x 1; Left EVH/SVG to OM2; AVR with Morrison 21 mm Inspiris Tissue Valve;  Surgeon: Surinder Canas MD;  Location: [facility];  Service: Cardiac Surgery    KNEE ARTHROSCOPY Left     ROTATOR CUFF REPAIR Left        Social History     Socioeconomic History    Marital status: /Civil Union     Spouse name: Not on file    Number of children: Not on file    Years of education: Not on file    Highest education level: Not on file   Occupational History    Occupation: occupational therapist     Employer: [employer]   Tobacco Use    Smoking status: Never    Smokeless tobacco: Current     Types: Snuff   Vaping Use    Vaping status: Never Used   Substance and Sexual Activity    Alcohol use: Not Currently    Drug use: Not Currently    Sexual activity: Not Currently   Other Topics Concern    Not on file   Social History Narrative    Not on file     Social Determinants of Health     Financial Resource Strain: Not on file   Food Insecurity: Not on file   Transportation Needs: Not on file   Physical Activity: Not on file   Stress: Not on file   Social Connections: Not on file   Intimate Partner Violence: Not on file   Housing Stability: Not on file       Review of symptoms:   Review of Systems   Constitutional:  Negative for chills, diaphoresis and fever.   Respiratory:  Negative for cough, chest tightness and shortness of breath.    Cardiovascular:  Negative for chest pain, palpitations and leg swelling.   Gastrointestinal:  Negative for abdominal distention, blood in stool, nausea and vomiting.   Genitourinary:  Negative for difficulty  "urinating.   Musculoskeletal:  Negative for arthralgias and back pain.   Neurological:  Negative for dizziness, syncope, light-headedness and headaches.   Psychiatric/Behavioral:  Negative for agitation and confusion. The patient is not nervous/anxious.         Vitals: /78 (BP Location: Left arm, Patient Position: Sitting, Cuff Size: Standard)   Pulse 56   Resp 16   Ht 5' 4\" (1.626 m)   Wt 84.8 kg (187 lb)   SpO2 97%   BMI 32.10 kg/m²         Physical Exam:     Physical Exam  Vitals and nursing note reviewed.   Constitutional:       General: He is not in acute distress.     Appearance: He is well-developed.   HENT:      Head: Normocephalic and atraumatic.   Eyes:      Conjunctiva/sclera: Conjunctivae normal.   Neck:      Vascular: No carotid bruit.   Cardiovascular:      Rate and Rhythm: Normal rate and regular rhythm.      Heart sounds: Normal heart sounds. No murmur heard.  Pulmonary:      Effort: Pulmonary effort is normal. No respiratory distress.      Breath sounds: Normal breath sounds.   Abdominal:      Palpations: Abdomen is soft.      Tenderness: There is no abdominal tenderness.   Musculoskeletal:         General: No swelling.      Cervical back: Neck supple.      Right lower leg: No edema.      Left lower leg: No edema.   Skin:     General: Skin is warm and dry.      Capillary Refill: Capillary refill takes less than 2 seconds.   Neurological:      Mental Status: He is alert and oriented to person, place, and time.   Psychiatric:         Mood and Affect: Mood normal.            Thank you for allowing me to participate in the care and evaluation of your patient.  Should you have any questions, please feel free to contact me.      "

## 2024-10-04 NOTE — ASSESSMENT & PLAN NOTE
Patient maintaining sinus rhythm  Continue Lopressor 25 mg twice daily  Continue Eliquis 5 mg twice daily.

## 2024-10-05 DIAGNOSIS — I25.10 ATHEROSCLEROSIS OF NATIVE CORONARY ARTERY OF NATIVE HEART WITHOUT ANGINA PECTORIS: ICD-10-CM

## 2024-10-08 RX ORDER — METOPROLOL TARTRATE 25 MG/1
25 TABLET, FILM COATED ORAL EVERY 12 HOURS SCHEDULED
Qty: 180 TABLET | Refills: 1 | Status: SHIPPED | OUTPATIENT
Start: 2024-10-08

## 2025-03-01 DIAGNOSIS — I25.10 ATHEROSCLEROSIS OF NATIVE CORONARY ARTERY OF NATIVE HEART WITHOUT ANGINA PECTORIS: ICD-10-CM

## 2025-03-03 RX ORDER — METOPROLOL TARTRATE 25 MG/1
25 TABLET, FILM COATED ORAL EVERY 12 HOURS SCHEDULED
Qty: 180 TABLET | Refills: 1 | Status: SHIPPED | OUTPATIENT
Start: 2025-03-03

## 2025-03-04 DIAGNOSIS — I10 ESSENTIAL HYPERTENSION: ICD-10-CM

## 2025-03-10 NOTE — TELEPHONE ENCOUNTER
Pt called to f/u on refill request for Lisinopril.  Advised pt it is pending, needs provider to review- no updated blood work.    Please advise.

## 2025-03-11 NOTE — TELEPHONE ENCOUNTER
Dr. Prasad's pt    Name from pharmacy: LISINOPRIL 2.5 MG TABLET          Will file in chart as: lisinopril (ZESTRIL) 2.5 mg tablet    Sig: TAKE 2 TABLETS BY MOUTH EVERY DAY    Disp: 180 tablet    Refills: 1    Start: 3/4/2025    Class: Normal    Non-formulary For: Essential hypertension    Last ordered: 6 months ago (8/30/2024) by Ivana Yun PA-C    Last refill: 12/2/2024    Rx #: [id number]    Cardiovascular:  ACE Inhibitors Xrascy88/10/2025 03:43 PM   Protocol Details K is 5.3 or below and within 360 days    eGFR is 60 or above and within 360 days    BP completed in the last 6 months    Valid encounter within last 6 months      To be filled at: [facility]/pharmacy #[id number] - [place] - [address]

## 2025-03-13 RX ORDER — LISINOPRIL 2.5 MG/1
5 TABLET ORAL DAILY
Qty: 180 TABLET | Refills: 1 | Status: SHIPPED | OUTPATIENT
Start: 2025-03-13

## 2025-03-13 NOTE — TELEPHONE ENCOUNTER
Pt called and said he got the message that he needed updated bloodwork. He is asking that the lab orders be sent to [facility] in [place]. Pt scheduled apt for Saturday.     Orders faxed to #[phone]

## 2025-07-28 ENCOUNTER — TELEPHONE (OUTPATIENT)
Age: 70
End: 2025-07-28

## 2025-07-28 DIAGNOSIS — Z95.1 S/P CABG (CORONARY ARTERY BYPASS GRAFT): ICD-10-CM

## 2025-07-28 DIAGNOSIS — Z95.2 S/P AVR (AORTIC VALVE REPLACEMENT): ICD-10-CM

## 2025-07-28 DIAGNOSIS — I25.10 CORONARY ARTERY DISEASE INVOLVING NATIVE CORONARY ARTERY OF NATIVE HEART WITHOUT ANGINA PECTORIS: ICD-10-CM

## 2025-07-28 DIAGNOSIS — I35.0 NONRHEUMATIC AORTIC VALVE STENOSIS: ICD-10-CM

## 2025-07-30 RX ORDER — ATORVASTATIN CALCIUM 80 MG/1
80 TABLET, FILM COATED ORAL
Qty: 90 TABLET | Refills: 0 | Status: SHIPPED | OUTPATIENT
Start: 2025-07-30

## (undated) DEVICE — GLOVE INDICATOR PI UNDERGLOVE SZ 8 BLUE

## (undated) DEVICE — VASOVIEW HEMOPRO 2: Brand: VASOVIEW HEMOPRO 2

## (undated) DEVICE — OASIS DRAIN, DUAL, IN-LINE, ATS COMPATIBLE: Brand: OASIS

## (undated) DEVICE — SILVER-COATED ANTIBACTERIAL BARRIER DRESSING: Brand: ACTICOAT SURGIC 10X12CM 5PK US

## (undated) DEVICE — SUT ETHILON 3-0 FS-1 18 IN 663G

## (undated) DEVICE — SUT SILK 2 60 IN SA8H

## (undated) DEVICE — AORTIC PUNCH 5.2 MM DISP

## (undated) DEVICE — SILVER-COATED ANTIBACTERIAL BARRIER DRESSING: Brand: ACTICOAT SURGIC 10X25CM 5PK US

## (undated) DEVICE — INTENDED FOR TISSUE SEPARATION, AND OTHER PROCEDURES THAT REQUIRE A SHARP SURGICAL BLADE TO PUNCTURE OR CUT.: Brand: BARD-PARKER SAFETY BLADES SIZE 15, STERILE

## (undated) DEVICE — SUT ETHIBOND 2-0 SH-1/SH-1 30 IN X763H

## (undated) DEVICE — LIGHT HANDLE COVER SLEEVE DISP BLUE STELLAR

## (undated) DEVICE — SUT PROLENE 7-0 BV175-8/BV175-8 24 IN EPM8747

## (undated) DEVICE — ANTIBACTERIAL UNDYED BRAIDED (POLYGLACTIN 910), SYNTHETIC ABSORBABLE SUTURE: Brand: COATED VICRYL

## (undated) DEVICE — CATH STRAIGHT RED RUBBER 20 FR

## (undated) DEVICE — PENCIL ELECTROSURG E-Z CLEAN -0035H

## (undated) DEVICE — SUT SILK 0 CT-1 30 IN 424H

## (undated) DEVICE — BLADE BEAVER MINI SZ 69

## (undated) DEVICE — HEMOCLIP CARTRIDGE LRG

## (undated) DEVICE — SUTURE GUIDE

## (undated) DEVICE — PLUMEPEN PRO 10FT

## (undated) DEVICE — PACK CABG PBDS

## (undated) DEVICE — THERMOFLECT BLANKET, L, 25EA                               TS THERMOFLECT BLANKET, 48" X 84", SILVER, 5/BG, 5 BG/CS NW: Brand: THERMOFLECT

## (undated) DEVICE — STERNAL WIRE

## (undated) DEVICE — SUCTION CATH 18 FR

## (undated) DEVICE — 40601 PROLONGED POSITIONING SYSTEM: Brand: 40601 PROLONGED POSITIONING SYSTEM

## (undated) DEVICE — 32 FR STRAIGHT – SOFT PVC CATHETER: Brand: PVC THORACIC CATHETERS

## (undated) DEVICE — SUT PDS PLUS 1 CTB 36 IN PDPB359T

## (undated) DEVICE — FILTER SMOKE EVAC VIROSAFE

## (undated) DEVICE — SUT MONOCRYL PLUS 3-0 PS-2 27 IN MCP427H

## (undated) DEVICE — SUT SILK 3-0 SH CR/8 18 IN C013D

## (undated) DEVICE — EVERGRIP INSERT SET 61MM: Brand: FOGARTY EVERGRIP

## (undated) DEVICE — INTENDED FOR TISSUE SEPARATION, AND OTHER PROCEDURES THAT REQUIRE A SHARP SURGICAL BLADE TO PUNCTURE OR CUT.: Brand: BARD-PARKER ® CARBON RIB-BACK BLADES

## (undated) DEVICE — BLANKET HYPOTHERMIA ADULT GAYMAR

## (undated) DEVICE — BONE WAX WHITE: Brand: BONE WAX WHITE

## (undated) DEVICE — ALCON OPHTHALMIC KNIFE 15 °: Brand: ALCON

## (undated) DEVICE — JP PERF DRN SIL FLT 10MM FULL: Brand: CARDINAL HEALTH

## (undated) DEVICE — SORIN VEIN DISTENTION KIT

## (undated) DEVICE — 32 FR RIGHT ANGLE – SOFT PVC CATHETER: Brand: PVC THORACIC CATHETERS

## (undated) DEVICE — LIGACLIP MCA MULTIPLE CLIP APPLIERS, 20 SMALL CLIPS: Brand: LIGACLIP

## (undated) DEVICE — ADHESIVE SKIN HIGH VISCOSITY EXOFIN 1ML

## (undated) DEVICE — ACE WRAP 6 IN UNSTERILE

## (undated) DEVICE — ELECTRODE BLADE E-Z CLEAN 2.75IN 7CM -0012A

## (undated) DEVICE — SUT PROLENE 4-0 RB-1/RB-1 36 IN 8557H

## (undated) DEVICE — GLOVE SRG BIOGEL ECLIPSE 7.5

## (undated) DEVICE — RECIP.STERNUM SAW BLADE 34/7.5/0.7MM: Brand: AESCULAP

## (undated) DEVICE — BULB SYRINGE,IRRIGATION WITH PROTECTIVE CAP: Brand: DOVER

## (undated) DEVICE — SUT PROLENE 6-0 C-1/C-1 30 IN 8307H

## (undated) DEVICE — 3000CC GUARDIAN II: Brand: GUARDIAN

## (undated) DEVICE — UMBILICAL TAPE: Brand: DEROYAL

## (undated) DEVICE — TRAY FOLEY 16FR SURESTEP TEMP SENS URIMETER STAT LOK

## (undated) DEVICE — TUBING INSUFFLATION SET ISO CONNECTOR

## (undated) DEVICE — DRESSING ALLEVYN LIFE HEEL 25 X 25.2CM